# Patient Record
Sex: FEMALE | Race: WHITE | Employment: FULL TIME | URBAN - METROPOLITAN AREA
[De-identification: names, ages, dates, MRNs, and addresses within clinical notes are randomized per-mention and may not be internally consistent; named-entity substitution may affect disease eponyms.]

---

## 2022-01-01 ENCOUNTER — APPOINTMENT (OUTPATIENT)
Dept: GENERAL RADIOLOGY | Facility: HOSPITAL | Age: 52
DRG: 207 | End: 2022-01-01
Attending: INTERNAL MEDICINE
Payer: COMMERCIAL

## 2022-01-01 ENCOUNTER — APPOINTMENT (OUTPATIENT)
Dept: ULTRASOUND IMAGING | Facility: HOSPITAL | Age: 52
DRG: 207 | End: 2022-01-01
Attending: EMERGENCY MEDICINE
Payer: COMMERCIAL

## 2022-01-01 ENCOUNTER — APPOINTMENT (OUTPATIENT)
Dept: ULTRASOUND IMAGING | Facility: HOSPITAL | Age: 52
DRG: 207 | End: 2022-01-01
Attending: INTERNAL MEDICINE
Payer: COMMERCIAL

## 2022-01-01 ENCOUNTER — APPOINTMENT (OUTPATIENT)
Dept: GENERAL RADIOLOGY | Facility: HOSPITAL | Age: 52
DRG: 207 | End: 2022-01-01
Attending: RADIOLOGY
Payer: COMMERCIAL

## 2022-01-01 ENCOUNTER — APPOINTMENT (OUTPATIENT)
Dept: CV DIAGNOSTICS | Facility: HOSPITAL | Age: 52
DRG: 207 | End: 2022-01-01
Attending: INTERNAL MEDICINE
Payer: COMMERCIAL

## 2022-01-01 ENCOUNTER — APPOINTMENT (OUTPATIENT)
Dept: GENERAL RADIOLOGY | Facility: HOSPITAL | Age: 52
DRG: 207 | End: 2022-01-01
Attending: EMERGENCY MEDICINE
Payer: COMMERCIAL

## 2022-01-01 ENCOUNTER — APPOINTMENT (OUTPATIENT)
Dept: CT IMAGING | Facility: HOSPITAL | Age: 52
DRG: 207 | End: 2022-01-01
Attending: EMERGENCY MEDICINE
Payer: COMMERCIAL

## 2022-01-01 ENCOUNTER — APPOINTMENT (OUTPATIENT)
Dept: INTERVENTIONAL RADIOLOGY/VASCULAR | Facility: HOSPITAL | Age: 52
DRG: 207 | End: 2022-01-01
Attending: INTERNAL MEDICINE
Payer: COMMERCIAL

## 2022-01-01 ENCOUNTER — APPOINTMENT (OUTPATIENT)
Dept: PICC SERVICES | Facility: HOSPITAL | Age: 52
DRG: 207 | End: 2022-01-01
Attending: STUDENT IN AN ORGANIZED HEALTH CARE EDUCATION/TRAINING PROGRAM
Payer: COMMERCIAL

## 2022-01-01 ENCOUNTER — HOSPITAL ENCOUNTER (INPATIENT)
Facility: HOSPITAL | Age: 52
LOS: 48 days | DRG: 207 | End: 2022-01-01
Attending: EMERGENCY MEDICINE | Admitting: INTERNAL MEDICINE
Payer: COMMERCIAL

## 2022-01-01 VITALS
TEMPERATURE: 97 F | RESPIRATION RATE: 35 BRPM | HEIGHT: 67 IN | BODY MASS INDEX: 35.64 KG/M2 | OXYGEN SATURATION: 58 % | HEART RATE: 111 BPM | WEIGHT: 227.06 LBS | SYSTOLIC BLOOD PRESSURE: 62 MMHG | DIASTOLIC BLOOD PRESSURE: 35 MMHG

## 2022-01-01 DIAGNOSIS — J96.01 ACUTE HYPOXEMIC RESPIRATORY FAILURE DUE TO COVID-19 (HCC): Primary | ICD-10-CM

## 2022-01-01 DIAGNOSIS — U07.1 ACUTE HYPOXEMIC RESPIRATORY FAILURE DUE TO COVID-19 (HCC): Primary | ICD-10-CM

## 2022-01-01 DIAGNOSIS — E87.1 HYPONATREMIA: ICD-10-CM

## 2022-01-01 LAB
ALBUMIN SERPL-MCNC: 1.4 G/DL (ref 3.4–5)
ALBUMIN SERPL-MCNC: 1.5 G/DL (ref 3.4–5)
ALBUMIN SERPL-MCNC: 1.6 G/DL (ref 3.4–5)
ALBUMIN SERPL-MCNC: 1.7 G/DL (ref 3.4–5)
ALBUMIN SERPL-MCNC: 1.8 G/DL (ref 3.4–5)
ALBUMIN SERPL-MCNC: 1.9 G/DL (ref 3.4–5)
ALBUMIN SERPL-MCNC: 2 G/DL (ref 3.4–5)
ALBUMIN SERPL-MCNC: 2.1 G/DL (ref 3.4–5)
ALBUMIN SERPL-MCNC: 2.2 G/DL (ref 3.4–5)
ALBUMIN SERPL-MCNC: 2.3 G/DL (ref 3.4–5)
ALBUMIN SERPL-MCNC: 2.3 G/DL (ref 3.4–5)
ALBUMIN SERPL-MCNC: 2.4 G/DL (ref 3.4–5)
ALBUMIN SERPL-MCNC: 2.5 G/DL (ref 3.4–5)
ALBUMIN SERPL-MCNC: 2.6 G/DL (ref 3.4–5)
ALBUMIN SERPL-MCNC: 2.7 G/DL (ref 3.4–5)
ALBUMIN SERPL-MCNC: 2.8 G/DL (ref 3.4–5)
ALBUMIN SERPL-MCNC: 2.8 G/DL (ref 3.4–5)
ALBUMIN SERPL-MCNC: 2.9 G/DL (ref 3.4–5)
ALBUMIN/GLOB SERPL: 0.5 {RATIO} (ref 1–2)
ALBUMIN/GLOB SERPL: 0.6 {RATIO} (ref 1–2)
ALBUMIN/GLOB SERPL: 0.7 {RATIO} (ref 1–2)
ALBUMIN/GLOB SERPL: 0.8 {RATIO} (ref 1–2)
ALBUMIN/GLOB SERPL: 0.8 {RATIO} (ref 1–2)
ALBUMIN/GLOB SERPL: 0.9 {RATIO} (ref 1–2)
ALBUMIN/GLOB SERPL: 1.2 {RATIO} (ref 1–2)
ALBUMIN/GLOB SERPL: 1.3 {RATIO} (ref 1–2)
ALP LIVER SERPL-CCNC: 103 U/L
ALP LIVER SERPL-CCNC: 42 U/L
ALP LIVER SERPL-CCNC: 55 U/L
ALP LIVER SERPL-CCNC: 56 U/L
ALP LIVER SERPL-CCNC: 57 U/L
ALP LIVER SERPL-CCNC: 59 U/L
ALP LIVER SERPL-CCNC: 64 U/L
ALP LIVER SERPL-CCNC: 65 U/L
ALP LIVER SERPL-CCNC: 66 U/L
ALP LIVER SERPL-CCNC: 67 U/L
ALP LIVER SERPL-CCNC: 68 U/L
ALP LIVER SERPL-CCNC: 68 U/L
ALP LIVER SERPL-CCNC: 75 U/L
ALP LIVER SERPL-CCNC: 78 U/L
ALP LIVER SERPL-CCNC: 78 U/L
ALP LIVER SERPL-CCNC: 79 U/L
ALP LIVER SERPL-CCNC: 84 U/L
ALP LIVER SERPL-CCNC: 85 U/L
ALP LIVER SERPL-CCNC: 87 U/L
ALP LIVER SERPL-CCNC: 94 U/L
ALP LIVER SERPL-CCNC: 95 U/L
ALP LIVER SERPL-CCNC: 98 U/L
ALT SERPL-CCNC: 19 U/L
ALT SERPL-CCNC: 20 U/L
ALT SERPL-CCNC: 23 U/L
ALT SERPL-CCNC: 24 U/L
ALT SERPL-CCNC: 24 U/L
ALT SERPL-CCNC: 25 U/L
ALT SERPL-CCNC: 25 U/L
ALT SERPL-CCNC: 28 U/L
ALT SERPL-CCNC: 29 U/L
ALT SERPL-CCNC: 32 U/L
ALT SERPL-CCNC: 34 U/L
ALT SERPL-CCNC: 36 U/L
ALT SERPL-CCNC: 38 U/L
ALT SERPL-CCNC: 40 U/L
ALT SERPL-CCNC: 41 U/L
ALT SERPL-CCNC: 42 U/L
ALT SERPL-CCNC: 42 U/L
ALT SERPL-CCNC: 44 U/L
ALT SERPL-CCNC: 45 U/L
ALT SERPL-CCNC: 45 U/L
ALT SERPL-CCNC: 47 U/L
ALT SERPL-CCNC: 50 U/L
ALT SERPL-CCNC: 67 U/L
ANION GAP SERPL CALC-SCNC: 0 MMOL/L (ref 0–18)
ANION GAP SERPL CALC-SCNC: 1 MMOL/L (ref 0–18)
ANION GAP SERPL CALC-SCNC: 10 MMOL/L (ref 0–18)
ANION GAP SERPL CALC-SCNC: 11 MMOL/L (ref 0–18)
ANION GAP SERPL CALC-SCNC: 12 MMOL/L (ref 0–18)
ANION GAP SERPL CALC-SCNC: 2 MMOL/L (ref 0–18)
ANION GAP SERPL CALC-SCNC: 3 MMOL/L (ref 0–18)
ANION GAP SERPL CALC-SCNC: 4 MMOL/L (ref 0–18)
ANION GAP SERPL CALC-SCNC: 5 MMOL/L (ref 0–18)
ANION GAP SERPL CALC-SCNC: 6 MMOL/L (ref 0–18)
ANION GAP SERPL CALC-SCNC: 7 MMOL/L (ref 0–18)
ANION GAP SERPL CALC-SCNC: 8 MMOL/L (ref 0–18)
ANION GAP SERPL CALC-SCNC: 9 MMOL/L (ref 0–18)
ANION GAP SERPL CALC-SCNC: 9 MMOL/L (ref 0–18)
ANTIBODY SCREEN: NEGATIVE
APTT PPP: 101.5 SECONDS (ref 23.3–35.6)
APTT PPP: 101.6 SECONDS (ref 23.3–35.6)
APTT PPP: 108.7 SECONDS (ref 23.3–35.6)
APTT PPP: 109.9 SECONDS (ref 23.3–35.6)
APTT PPP: 112.3 SECONDS (ref 23.3–35.6)
APTT PPP: 122.1 SECONDS (ref 23.3–35.6)
APTT PPP: 134.4 SECONDS (ref 23.3–35.6)
APTT PPP: 151.4 SECONDS (ref 23.3–35.6)
APTT PPP: 224.6 SECONDS (ref 23.3–35.6)
APTT PPP: 24.7 SECONDS (ref 23.3–35.6)
APTT PPP: 29.8 SECONDS (ref 23.3–35.6)
APTT PPP: 36 SECONDS (ref 23.3–35.6)
APTT PPP: 37.5 SECONDS (ref 23.3–35.6)
APTT PPP: 37.6 SECONDS (ref 23.3–35.6)
APTT PPP: 46.9 SECONDS (ref 23.3–35.6)
APTT PPP: 47.7 SECONDS (ref 23.3–35.6)
APTT PPP: 55.6 SECONDS (ref 23.3–35.6)
APTT PPP: 58.1 SECONDS (ref 23.3–35.6)
APTT PPP: 58.5 SECONDS (ref 23.3–35.6)
APTT PPP: 58.6 SECONDS (ref 23.3–35.6)
APTT PPP: 62.2 SECONDS (ref 23.3–35.6)
APTT PPP: 62.4 SECONDS (ref 23.3–35.6)
APTT PPP: 64.1 SECONDS (ref 23.3–35.6)
APTT PPP: 64.2 SECONDS (ref 23.3–35.6)
APTT PPP: 65.6 SECONDS (ref 23.3–35.6)
APTT PPP: 68 SECONDS (ref 23.3–35.6)
APTT PPP: 71.1 SECONDS (ref 23.3–35.6)
APTT PPP: 72.1 SECONDS (ref 23.3–35.6)
APTT PPP: 73.2 SECONDS (ref 23.3–35.6)
APTT PPP: 76 SECONDS (ref 23.3–35.6)
APTT PPP: 77.4 SECONDS (ref 23.3–35.6)
APTT PPP: 77.5 SECONDS (ref 23.3–35.6)
APTT PPP: 77.9 SECONDS (ref 23.3–35.6)
APTT PPP: 78.7 SECONDS (ref 23.3–35.6)
APTT PPP: 80.3 SECONDS (ref 23.3–35.6)
APTT PPP: 81.2 SECONDS (ref 23.3–35.6)
APTT PPP: 82.9 SECONDS (ref 23.3–35.6)
APTT PPP: 84.7 SECONDS (ref 23.3–35.6)
APTT PPP: 84.7 SECONDS (ref 23.3–35.6)
APTT PPP: 85.8 SECONDS (ref 23.3–35.6)
APTT PPP: 86.4 SECONDS (ref 23.3–35.6)
APTT PPP: 86.5 SECONDS (ref 23.3–35.6)
APTT PPP: 87.5 SECONDS (ref 23.3–35.6)
APTT PPP: 88.3 SECONDS (ref 23.3–35.6)
APTT PPP: 90.5 SECONDS (ref 23.3–35.6)
APTT PPP: 91.6 SECONDS (ref 23.3–35.6)
APTT PPP: 92.1 SECONDS (ref 23.3–35.6)
APTT PPP: 94.3 SECONDS (ref 23.3–35.6)
APTT PPP: >240 SECONDS (ref 23.3–35.6)
AST SERPL-CCNC: 116 U/L (ref 15–37)
AST SERPL-CCNC: 134 U/L (ref 15–37)
AST SERPL-CCNC: 17 U/L (ref 15–37)
AST SERPL-CCNC: 18 U/L (ref 15–37)
AST SERPL-CCNC: 19 U/L (ref 15–37)
AST SERPL-CCNC: 24 U/L (ref 15–37)
AST SERPL-CCNC: 27 U/L (ref 15–37)
AST SERPL-CCNC: 29 U/L (ref 15–37)
AST SERPL-CCNC: 32 U/L (ref 15–37)
AST SERPL-CCNC: 33 U/L (ref 15–37)
AST SERPL-CCNC: 33 U/L (ref 15–37)
AST SERPL-CCNC: 34 U/L (ref 15–37)
AST SERPL-CCNC: 36 U/L (ref 15–37)
AST SERPL-CCNC: 39 U/L (ref 15–37)
AST SERPL-CCNC: 44 U/L (ref 15–37)
AST SERPL-CCNC: 45 U/L (ref 15–37)
AST SERPL-CCNC: 50 U/L (ref 15–37)
AST SERPL-CCNC: 54 U/L (ref 15–37)
AST SERPL-CCNC: 59 U/L (ref 15–37)
AST SERPL-CCNC: 61 U/L (ref 15–37)
AST SERPL-CCNC: 67 U/L (ref 15–37)
AST SERPL-CCNC: 70 U/L (ref 15–37)
AST SERPL-CCNC: 76 U/L (ref 15–37)
BASE EXCESS BLD CALC-SCNC: -2.9 MMOL/L (ref ?–2)
BASE EXCESS BLD CALC-SCNC: -5.6 MMOL/L (ref ?–2)
BASE EXCESS BLD CALC-SCNC: -5.9 MMOL/L (ref ?–2)
BASE EXCESS BLD CALC-SCNC: 0.7 MMOL/L (ref ?–2)
BASE EXCESS BLD CALC-SCNC: 0.9 MMOL/L (ref ?–2)
BASE EXCESS BLD CALC-SCNC: 1 MMOL/L (ref ?–2)
BASE EXCESS BLD CALC-SCNC: 1.2 MMOL/L (ref ?–2)
BASE EXCESS BLD CALC-SCNC: 1.4 MMOL/L (ref ?–2)
BASE EXCESS BLD CALC-SCNC: 1.7 MMOL/L (ref ?–2)
BASE EXCESS BLD CALC-SCNC: 1.8 MMOL/L (ref ?–2)
BASE EXCESS BLD CALC-SCNC: 2.1 MMOL/L (ref ?–2)
BASE EXCESS BLD CALC-SCNC: 2.2 MMOL/L (ref ?–2)
BASE EXCESS BLD CALC-SCNC: 2.9 MMOL/L (ref ?–2)
BASE EXCESS BLD CALC-SCNC: 4 MMOL/L (ref ?–2)
BASE EXCESS BLD CALC-SCNC: 4.4 MMOL/L (ref ?–2)
BASE EXCESS BLD CALC-SCNC: 8.5 MMOL/L (ref ?–2)
BASOPHILS # BLD AUTO: 0 X10(3) UL (ref 0–0.2)
BASOPHILS # BLD AUTO: 0.02 X10(3) UL (ref 0–0.2)
BASOPHILS # BLD AUTO: 0.03 X10(3) UL (ref 0–0.2)
BASOPHILS # BLD AUTO: 0.04 X10(3) UL (ref 0–0.2)
BASOPHILS # BLD AUTO: 0.05 X10(3) UL (ref 0–0.2)
BASOPHILS # BLD AUTO: 0.06 X10(3) UL (ref 0–0.2)
BASOPHILS # BLD AUTO: 0.06 X10(3) UL (ref 0–0.2)
BASOPHILS # BLD AUTO: 0.08 X10(3) UL (ref 0–0.2)
BASOPHILS # BLD: 0 X10(3) UL (ref 0–0.2)
BASOPHILS # BLD: 0.3 X10(3) UL (ref 0–0.2)
BASOPHILS # BLD: 0.42 X10(3) UL (ref 0–0.2)
BASOPHILS NFR BLD AUTO: 0 %
BASOPHILS NFR BLD AUTO: 0.1 %
BASOPHILS NFR BLD AUTO: 0.2 %
BASOPHILS NFR BLD AUTO: 0.3 %
BASOPHILS NFR BLD: 0 %
BASOPHILS NFR BLD: 1 %
BASOPHILS NFR BLD: 1 %
BILIRUB SERPL-MCNC: 0.2 MG/DL (ref 0.1–2)
BILIRUB SERPL-MCNC: 0.2 MG/DL (ref 0.1–2)
BILIRUB SERPL-MCNC: 0.3 MG/DL (ref 0.1–2)
BILIRUB SERPL-MCNC: 0.4 MG/DL (ref 0.1–2)
BILIRUB SERPL-MCNC: 0.5 MG/DL (ref 0.1–2)
BILIRUB SERPL-MCNC: 0.6 MG/DL (ref 0.1–2)
BILIRUB SERPL-MCNC: 0.7 MG/DL (ref 0.1–2)
BILIRUB SERPL-MCNC: 0.8 MG/DL (ref 0.1–2)
BILIRUB SERPL-MCNC: 0.8 MG/DL (ref 0.1–2)
BILIRUB SERPL-MCNC: 1.1 MG/DL (ref 0.1–2)
BILIRUB SERPL-MCNC: 1.5 MG/DL (ref 0.1–2)
BILIRUB UR QL: NEGATIVE
BILIRUB UR QL: NEGATIVE
BLOOD TYPE BARCODE: 600
BLOOD TYPE BARCODE: 6200
BLOOD TYPE BARCODE: 6200
BODY TEMPERATURE: 35.8
BODY TEMPERATURE: 35.8
BUN BLD-MCNC: 10 MG/DL (ref 7–18)
BUN BLD-MCNC: 102 MG/DL (ref 7–18)
BUN BLD-MCNC: 11 MG/DL (ref 7–18)
BUN BLD-MCNC: 12 MG/DL (ref 7–18)
BUN BLD-MCNC: 13 MG/DL (ref 7–18)
BUN BLD-MCNC: 14 MG/DL (ref 7–18)
BUN BLD-MCNC: 15 MG/DL (ref 7–18)
BUN BLD-MCNC: 16 MG/DL (ref 7–18)
BUN BLD-MCNC: 17 MG/DL (ref 7–18)
BUN BLD-MCNC: 17 MG/DL (ref 7–18)
BUN BLD-MCNC: 18 MG/DL (ref 7–18)
BUN BLD-MCNC: 18 MG/DL (ref 7–18)
BUN BLD-MCNC: 19 MG/DL (ref 7–18)
BUN BLD-MCNC: 20 MG/DL (ref 7–18)
BUN BLD-MCNC: 21 MG/DL (ref 7–18)
BUN BLD-MCNC: 22 MG/DL (ref 7–18)
BUN BLD-MCNC: 23 MG/DL (ref 7–18)
BUN BLD-MCNC: 24 MG/DL (ref 7–18)
BUN BLD-MCNC: 25 MG/DL (ref 7–18)
BUN BLD-MCNC: 26 MG/DL (ref 7–18)
BUN BLD-MCNC: 27 MG/DL (ref 7–18)
BUN BLD-MCNC: 28 MG/DL (ref 7–18)
BUN BLD-MCNC: 29 MG/DL (ref 7–18)
BUN BLD-MCNC: 30 MG/DL (ref 7–18)
BUN BLD-MCNC: 30 MG/DL (ref 7–18)
BUN BLD-MCNC: 31 MG/DL (ref 7–18)
BUN BLD-MCNC: 32 MG/DL (ref 7–18)
BUN BLD-MCNC: 33 MG/DL (ref 7–18)
BUN BLD-MCNC: 36 MG/DL (ref 7–18)
BUN BLD-MCNC: 36 MG/DL (ref 7–18)
BUN BLD-MCNC: 40 MG/DL (ref 7–18)
BUN BLD-MCNC: 42 MG/DL (ref 7–18)
BUN BLD-MCNC: 43 MG/DL (ref 7–18)
BUN BLD-MCNC: 43 MG/DL (ref 7–18)
BUN BLD-MCNC: 44 MG/DL (ref 7–18)
BUN BLD-MCNC: 60 MG/DL (ref 7–18)
BUN BLD-MCNC: 60 MG/DL (ref 7–18)
BUN BLD-MCNC: 79 MG/DL (ref 7–18)
BUN BLD-MCNC: 84 MG/DL (ref 7–18)
BUN BLD-MCNC: 92 MG/DL (ref 7–18)
BUN/CREAT SERPL: 11.9 (ref 10–20)
BUN/CREAT SERPL: 13.4 (ref 10–20)
BUN/CREAT SERPL: 16.4 (ref 10–20)
BUN/CREAT SERPL: 17.5 (ref 10–20)
BUN/CREAT SERPL: 18.9 (ref 10–20)
BUN/CREAT SERPL: 19.3 (ref 10–20)
BUN/CREAT SERPL: 19.6 (ref 10–20)
BUN/CREAT SERPL: 19.8 (ref 10–20)
BUN/CREAT SERPL: 20 (ref 10–20)
BUN/CREAT SERPL: 20.3 (ref 10–20)
BUN/CREAT SERPL: 20.3 (ref 10–20)
BUN/CREAT SERPL: 20.4 (ref 10–20)
BUN/CREAT SERPL: 20.7 (ref 10–20)
BUN/CREAT SERPL: 20.8 (ref 10–20)
BUN/CREAT SERPL: 20.9 (ref 10–20)
BUN/CREAT SERPL: 21.1 (ref 10–20)
BUN/CREAT SERPL: 21.3 (ref 10–20)
BUN/CREAT SERPL: 21.3 (ref 10–20)
BUN/CREAT SERPL: 21.4 (ref 10–20)
BUN/CREAT SERPL: 21.6 (ref 10–20)
BUN/CREAT SERPL: 22 (ref 10–20)
BUN/CREAT SERPL: 22.1 (ref 10–20)
BUN/CREAT SERPL: 22.2 (ref 10–20)
BUN/CREAT SERPL: 22.3 (ref 10–20)
BUN/CREAT SERPL: 22.6 (ref 10–20)
BUN/CREAT SERPL: 22.7 (ref 10–20)
BUN/CREAT SERPL: 22.8 (ref 10–20)
BUN/CREAT SERPL: 22.8 (ref 10–20)
BUN/CREAT SERPL: 22.9 (ref 10–20)
BUN/CREAT SERPL: 23.1 (ref 10–20)
BUN/CREAT SERPL: 23.1 (ref 10–20)
BUN/CREAT SERPL: 23.2 (ref 10–20)
BUN/CREAT SERPL: 23.2 (ref 10–20)
BUN/CREAT SERPL: 23.3 (ref 10–20)
BUN/CREAT SERPL: 23.3 (ref 10–20)
BUN/CREAT SERPL: 23.5 (ref 10–20)
BUN/CREAT SERPL: 23.5 (ref 10–20)
BUN/CREAT SERPL: 23.7 (ref 10–20)
BUN/CREAT SERPL: 23.9 (ref 10–20)
BUN/CREAT SERPL: 24 (ref 10–20)
BUN/CREAT SERPL: 24.1 (ref 10–20)
BUN/CREAT SERPL: 24.2 (ref 10–20)
BUN/CREAT SERPL: 24.2 (ref 10–20)
BUN/CREAT SERPL: 24.4 (ref 10–20)
BUN/CREAT SERPL: 24.5 (ref 10–20)
BUN/CREAT SERPL: 24.6 (ref 10–20)
BUN/CREAT SERPL: 24.8 (ref 10–20)
BUN/CREAT SERPL: 24.8 (ref 10–20)
BUN/CREAT SERPL: 25 (ref 10–20)
BUN/CREAT SERPL: 25.4 (ref 10–20)
BUN/CREAT SERPL: 25.5 (ref 10–20)
BUN/CREAT SERPL: 25.8 (ref 10–20)
BUN/CREAT SERPL: 26 (ref 10–20)
BUN/CREAT SERPL: 26 (ref 10–20)
BUN/CREAT SERPL: 26.2 (ref 10–20)
BUN/CREAT SERPL: 26.5 (ref 10–20)
BUN/CREAT SERPL: 26.9 (ref 10–20)
BUN/CREAT SERPL: 27 (ref 10–20)
BUN/CREAT SERPL: 27.1 (ref 10–20)
BUN/CREAT SERPL: 27.3 (ref 10–20)
BUN/CREAT SERPL: 27.4 (ref 10–20)
BUN/CREAT SERPL: 27.5 (ref 10–20)
BUN/CREAT SERPL: 27.6 (ref 10–20)
BUN/CREAT SERPL: 28 (ref 10–20)
BUN/CREAT SERPL: 28.2 (ref 10–20)
BUN/CREAT SERPL: 28.7 (ref 10–20)
BUN/CREAT SERPL: 29.8 (ref 10–20)
BUN/CREAT SERPL: 29.9 (ref 10–20)
BUN/CREAT SERPL: 30.2 (ref 10–20)
BUN/CREAT SERPL: 30.6 (ref 10–20)
BUN/CREAT SERPL: 30.7 (ref 10–20)
BUN/CREAT SERPL: 31.1 (ref 10–20)
BUN/CREAT SERPL: 31.4 (ref 10–20)
BUN/CREAT SERPL: 31.9 (ref 10–20)
BUN/CREAT SERPL: 32.1 (ref 10–20)
BUN/CREAT SERPL: 32.6 (ref 10–20)
BUN/CREAT SERPL: 33.3 (ref 10–20)
BUN/CREAT SERPL: 34.4 (ref 10–20)
BUN/CREAT SERPL: 35.2 (ref 10–20)
BUN/CREAT SERPL: 35.6 (ref 10–20)
BUN/CREAT SERPL: 42.6 (ref 10–20)
C DIFF TOX B STL QL: NEGATIVE
C DIFF TOX B STL QL: NEGATIVE
CA-I BLD-SCNC: 1.27 MMOL/L (ref 0.95–1.32)
CA-I BLD-SCNC: 1.46 MMOL/L (ref 0.95–1.32)
CALCIUM BLD-MCNC: 6.8 MG/DL (ref 8.5–10.1)
CALCIUM BLD-MCNC: 6.9 MG/DL (ref 8.5–10.1)
CALCIUM BLD-MCNC: 6.9 MG/DL (ref 8.5–10.1)
CALCIUM BLD-MCNC: 7.2 MG/DL (ref 8.5–10.1)
CALCIUM BLD-MCNC: 7.3 MG/DL (ref 8.5–10.1)
CALCIUM BLD-MCNC: 7.4 MG/DL (ref 8.5–10.1)
CALCIUM BLD-MCNC: 7.4 MG/DL (ref 8.5–10.1)
CALCIUM BLD-MCNC: 7.5 MG/DL (ref 8.5–10.1)
CALCIUM BLD-MCNC: 7.6 MG/DL (ref 8.5–10.1)
CALCIUM BLD-MCNC: 7.7 MG/DL (ref 8.5–10.1)
CALCIUM BLD-MCNC: 7.8 MG/DL (ref 8.5–10.1)
CALCIUM BLD-MCNC: 7.9 MG/DL (ref 8.5–10.1)
CALCIUM BLD-MCNC: 8 MG/DL (ref 8.5–10.1)
CALCIUM BLD-MCNC: 8.1 MG/DL (ref 8.5–10.1)
CALCIUM BLD-MCNC: 8.2 MG/DL (ref 8.5–10.1)
CALCIUM BLD-MCNC: 8.3 MG/DL (ref 8.5–10.1)
CALCIUM BLD-MCNC: 8.4 MG/DL (ref 8.5–10.1)
CALCIUM BLD-MCNC: 8.5 MG/DL (ref 8.5–10.1)
CALCIUM BLD-MCNC: 8.6 MG/DL (ref 8.5–10.1)
CALCIUM BLD-MCNC: 8.7 MG/DL (ref 8.5–10.1)
CALCIUM BLD-MCNC: 8.8 MG/DL (ref 8.5–10.1)
CALCIUM BLD-MCNC: 8.9 MG/DL (ref 8.5–10.1)
CALCIUM BLD-MCNC: 8.9 MG/DL (ref 8.5–10.1)
CALCIUM BLD-MCNC: 9 MG/DL (ref 8.5–10.1)
CALCIUM BLD-MCNC: 9.1 MG/DL (ref 8.5–10.1)
CALCIUM BLD-MCNC: 9.1 MG/DL (ref 8.5–10.1)
CALCIUM BLD-MCNC: 9.3 MG/DL (ref 8.5–10.1)
CALCIUM BLD-MCNC: 9.4 MG/DL (ref 8.5–10.1)
CALCIUM BLD-MCNC: 9.4 MG/DL (ref 8.5–10.1)
CALCIUM BLD-MCNC: 9.9 MG/DL (ref 8.5–10.1)
CHLORIDE SERPL-SCNC: 100 MMOL/L (ref 98–112)
CHLORIDE SERPL-SCNC: 101 MMOL/L (ref 98–112)
CHLORIDE SERPL-SCNC: 102 MMOL/L (ref 98–112)
CHLORIDE SERPL-SCNC: 103 MMOL/L (ref 98–112)
CHLORIDE SERPL-SCNC: 104 MMOL/L (ref 98–112)
CHLORIDE SERPL-SCNC: 105 MMOL/L (ref 98–112)
CHLORIDE SERPL-SCNC: 106 MMOL/L (ref 98–112)
CHLORIDE SERPL-SCNC: 107 MMOL/L (ref 98–112)
CHLORIDE SERPL-SCNC: 108 MMOL/L (ref 98–112)
CHLORIDE SERPL-SCNC: 109 MMOL/L (ref 98–112)
CHLORIDE SERPL-SCNC: 110 MMOL/L (ref 98–112)
CHLORIDE SERPL-SCNC: 110 MMOL/L (ref 98–112)
CHLORIDE SERPL-SCNC: 111 MMOL/L (ref 98–112)
CHLORIDE SERPL-SCNC: 113 MMOL/L (ref 98–112)
CHLORIDE SERPL-SCNC: 113 MMOL/L (ref 98–112)
CHLORIDE SERPL-SCNC: 114 MMOL/L (ref 98–112)
CHLORIDE SERPL-SCNC: 87 MMOL/L (ref 98–112)
CHLORIDE SERPL-SCNC: 91 MMOL/L (ref 98–112)
CHLORIDE SERPL-SCNC: 92 MMOL/L (ref 98–112)
CHLORIDE SERPL-SCNC: 93 MMOL/L (ref 98–112)
CHLORIDE SERPL-SCNC: 94 MMOL/L (ref 98–112)
CHLORIDE SERPL-SCNC: 97 MMOL/L (ref 98–112)
CHLORIDE SERPL-SCNC: 97 MMOL/L (ref 98–112)
CHLORIDE SERPL-SCNC: 98 MMOL/L (ref 98–112)
CK SERPL-CCNC: 114 U/L
CMV ANTIBODY IGM: 33.8 AU/ML
CO2 SERPL-SCNC: 18 MMOL/L (ref 21–32)
CO2 SERPL-SCNC: 18 MMOL/L (ref 21–32)
CO2 SERPL-SCNC: 20 MMOL/L (ref 21–32)
CO2 SERPL-SCNC: 21 MMOL/L (ref 21–32)
CO2 SERPL-SCNC: 22 MMOL/L (ref 21–32)
CO2 SERPL-SCNC: 23 MMOL/L (ref 21–32)
CO2 SERPL-SCNC: 24 MMOL/L (ref 21–32)
CO2 SERPL-SCNC: 25 MMOL/L (ref 21–32)
CO2 SERPL-SCNC: 26 MMOL/L (ref 21–32)
CO2 SERPL-SCNC: 27 MMOL/L (ref 21–32)
CO2 SERPL-SCNC: 28 MMOL/L (ref 21–32)
CO2 SERPL-SCNC: 29 MMOL/L (ref 21–32)
CO2 SERPL-SCNC: 30 MMOL/L (ref 21–32)
CO2 SERPL-SCNC: 31 MMOL/L (ref 21–32)
COHGB MFR BLD: 2.7 % (ref 0–1.5)
COHGB MFR BLD: 5.7 % (ref 0–3)
COLOR UR: YELLOW
COLOR UR: YELLOW
CREAT BLD-MCNC: 0.45 MG/DL
CREAT BLD-MCNC: 0.48 MG/DL
CREAT BLD-MCNC: 0.5 MG/DL
CREAT BLD-MCNC: 0.5 MG/DL
CREAT BLD-MCNC: 0.51 MG/DL
CREAT BLD-MCNC: 0.51 MG/DL
CREAT BLD-MCNC: 0.53 MG/DL
CREAT BLD-MCNC: 0.54 MG/DL
CREAT BLD-MCNC: 0.54 MG/DL
CREAT BLD-MCNC: 0.55 MG/DL
CREAT BLD-MCNC: 0.56 MG/DL
CREAT BLD-MCNC: 0.57 MG/DL
CREAT BLD-MCNC: 0.57 MG/DL
CREAT BLD-MCNC: 0.58 MG/DL
CREAT BLD-MCNC: 0.59 MG/DL
CREAT BLD-MCNC: 0.6 MG/DL
CREAT BLD-MCNC: 0.61 MG/DL
CREAT BLD-MCNC: 0.61 MG/DL
CREAT BLD-MCNC: 0.62 MG/DL
CREAT BLD-MCNC: 0.67 MG/DL
CREAT BLD-MCNC: 0.69 MG/DL
CREAT BLD-MCNC: 0.69 MG/DL
CREAT BLD-MCNC: 0.71 MG/DL
CREAT BLD-MCNC: 0.72 MG/DL
CREAT BLD-MCNC: 0.73 MG/DL
CREAT BLD-MCNC: 0.75 MG/DL
CREAT BLD-MCNC: 0.75 MG/DL
CREAT BLD-MCNC: 0.76 MG/DL
CREAT BLD-MCNC: 0.79 MG/DL
CREAT BLD-MCNC: 0.88 MG/DL
CREAT BLD-MCNC: 0.88 MG/DL
CREAT BLD-MCNC: 0.89 MG/DL
CREAT BLD-MCNC: 0.9 MG/DL
CREAT BLD-MCNC: 0.91 MG/DL
CREAT BLD-MCNC: 0.92 MG/DL
CREAT BLD-MCNC: 0.93 MG/DL
CREAT BLD-MCNC: 0.94 MG/DL
CREAT BLD-MCNC: 0.95 MG/DL
CREAT BLD-MCNC: 0.96 MG/DL
CREAT BLD-MCNC: 0.97 MG/DL
CREAT BLD-MCNC: 1 MG/DL
CREAT BLD-MCNC: 1 MG/DL
CREAT BLD-MCNC: 1.01 MG/DL
CREAT BLD-MCNC: 1.02 MG/DL
CREAT BLD-MCNC: 1.02 MG/DL
CREAT BLD-MCNC: 1.06 MG/DL
CREAT BLD-MCNC: 1.06 MG/DL
CREAT BLD-MCNC: 1.07 MG/DL
CREAT BLD-MCNC: 1.08 MG/DL
CREAT BLD-MCNC: 1.1 MG/DL
CREAT BLD-MCNC: 1.12 MG/DL
CREAT BLD-MCNC: 1.13 MG/DL
CREAT BLD-MCNC: 1.14 MG/DL
CREAT BLD-MCNC: 1.14 MG/DL
CREAT BLD-MCNC: 1.15 MG/DL
CREAT BLD-MCNC: 1.15 MG/DL
CREAT BLD-MCNC: 1.18 MG/DL
CREAT BLD-MCNC: 1.18 MG/DL
CREAT BLD-MCNC: 1.21 MG/DL
CREAT BLD-MCNC: 1.22 MG/DL
CREAT BLD-MCNC: 1.23 MG/DL
CREAT BLD-MCNC: 1.31 MG/DL
CREAT BLD-MCNC: 1.31 MG/DL
CREAT BLD-MCNC: 1.32 MG/DL
CREAT BLD-MCNC: 1.33 MG/DL
CREAT BLD-MCNC: 1.34 MG/DL
CREAT BLD-MCNC: 1.34 MG/DL
CREAT BLD-MCNC: 1.37 MG/DL
CREAT BLD-MCNC: 1.49 MG/DL
CREAT BLD-MCNC: 1.58 MG/DL
CREAT BLD-MCNC: 1.62 MG/DL
CREAT BLD-MCNC: 2.46 MG/DL
CREAT BLD-MCNC: 2.48 MG/DL
CREAT BLD-MCNC: 2.58 MG/DL
CREAT BLD-MCNC: 3.31 MG/DL
CREAT BLD-MCNC: 3.47 MG/DL
CREAT BLD-MCNC: 3.62 MG/DL
CREAT BLD-MCNC: 4.34 MG/DL
CREAT UR-SCNC: 41.8 MG/DL
CREAT UR-SCNC: 64.7 MG/DL
CREAT UR-SCNC: 88 MG/DL
CRP SERPL-MCNC: 0.32 MG/DL (ref ?–0.3)
CRP SERPL-MCNC: 0.39 MG/DL (ref ?–0.3)
CRP SERPL-MCNC: 0.46 MG/DL (ref ?–0.3)
CRP SERPL-MCNC: 0.72 MG/DL (ref ?–0.3)
CRP SERPL-MCNC: 0.78 MG/DL (ref ?–0.3)
CRP SERPL-MCNC: 1.16 MG/DL (ref ?–0.3)
CRP SERPL-MCNC: 1.44 MG/DL (ref ?–0.3)
CRP SERPL-MCNC: 1.93 MG/DL (ref ?–0.3)
CRP SERPL-MCNC: 10.7 MG/DL (ref ?–0.3)
CRP SERPL-MCNC: 11 MG/DL (ref ?–0.3)
CRP SERPL-MCNC: 12.6 MG/DL (ref ?–0.3)
CRP SERPL-MCNC: 13.1 MG/DL (ref ?–0.3)
CRP SERPL-MCNC: 14.4 MG/DL (ref ?–0.3)
CRP SERPL-MCNC: 2.56 MG/DL (ref ?–0.3)
CRP SERPL-MCNC: 2.89 MG/DL (ref ?–0.3)
CRP SERPL-MCNC: 5.35 MG/DL (ref ?–0.3)
CRP SERPL-MCNC: 5.93 MG/DL (ref ?–0.3)
CRP SERPL-MCNC: 5.98 MG/DL (ref ?–0.3)
CRP SERPL-MCNC: 6.52 MG/DL (ref ?–0.3)
CRP SERPL-MCNC: 6.67 MG/DL (ref ?–0.3)
CRP SERPL-MCNC: 8.32 MG/DL (ref ?–0.3)
CYTOMEGALOVIRUS DETECTION, PCR: NOT DETECTED
D DIMER PPP FEU-MCNC: 1.05 UG/ML FEU (ref ?–0.51)
D DIMER PPP FEU-MCNC: 1.08 UG/ML FEU (ref ?–0.51)
D DIMER PPP FEU-MCNC: 1.09 UG/ML FEU (ref ?–0.51)
D DIMER PPP FEU-MCNC: 1.37 UG/ML FEU (ref ?–0.51)
D DIMER PPP FEU-MCNC: 2.14 UG/ML FEU (ref ?–0.51)
D DIMER PPP FEU-MCNC: >20 UG/ML FEU (ref ?–0.51)
D DIMER PPP FEU-MCNC: >20 UG/ML FEU (ref ?–0.51)
DEPRECATED HBV CORE AB SER IA-ACNC: 1015.8 NG/ML
DEPRECATED HBV CORE AB SER IA-ACNC: 1229.7 NG/ML
DEPRECATED HBV CORE AB SER IA-ACNC: 1486.1 NG/ML
DEPRECATED HBV CORE AB SER IA-ACNC: 1642.4 NG/ML
DEPRECATED HBV CORE AB SER IA-ACNC: 1904.5 NG/ML
DEPRECATED HBV CORE AB SER IA-ACNC: 1921.7 NG/ML
DEPRECATED HBV CORE AB SER IA-ACNC: 2133.4 NG/ML
DEPRECATED HBV CORE AB SER IA-ACNC: 2633.3 NG/ML
DEPRECATED HBV CORE AB SER IA-ACNC: 3642.5 NG/ML
DEPRECATED HBV CORE AB SER IA-ACNC: 3676.1 NG/ML
DEPRECATED HBV CORE AB SER IA-ACNC: 3737.7 NG/ML
DEPRECATED HBV CORE AB SER IA-ACNC: 489.5 NG/ML
DEPRECATED HBV CORE AB SER IA-ACNC: 498.1 NG/ML
DEPRECATED HBV CORE AB SER IA-ACNC: 516.3 NG/ML
DEPRECATED HBV CORE AB SER IA-ACNC: 575.7 NG/ML
DEPRECATED HBV CORE AB SER IA-ACNC: 592.9 NG/ML
DEPRECATED HBV CORE AB SER IA-ACNC: 650.3 NG/ML
DEPRECATED HBV CORE AB SER IA-ACNC: 675.6 NG/ML
DEPRECATED HBV CORE AB SER IA-ACNC: 693.3 NG/ML
DEPRECATED HBV CORE AB SER IA-ACNC: 760.4 NG/ML
DEPRECATED HBV CORE AB SER IA-ACNC: 790.5 NG/ML
DEPRECATED RDW RBC AUTO: 109.7 FL (ref 35.1–46.3)
DEPRECATED RDW RBC AUTO: 115.7 FL (ref 35.1–46.3)
DEPRECATED RDW RBC AUTO: 117.9 FL (ref 35.1–46.3)
DEPRECATED RDW RBC AUTO: 120.9 FL (ref 35.1–46.3)
DEPRECATED RDW RBC AUTO: 121.8 FL (ref 35.1–46.3)
DEPRECATED RDW RBC AUTO: 122 FL (ref 35.1–46.3)
DEPRECATED RDW RBC AUTO: 123 FL (ref 35.1–46.3)
DEPRECATED RDW RBC AUTO: 123.6 FL (ref 35.1–46.3)
DEPRECATED RDW RBC AUTO: 125.6 FL (ref 35.1–46.3)
DEPRECATED RDW RBC AUTO: 126.6 FL (ref 35.1–46.3)
DEPRECATED RDW RBC AUTO: 131.6 FL (ref 35.1–46.3)
DEPRECATED RDW RBC AUTO: 132.4 FL (ref 35.1–46.3)
DEPRECATED RDW RBC AUTO: 135.8 FL (ref 35.1–46.3)
DEPRECATED RDW RBC AUTO: 137 FL (ref 35.1–46.3)
DEPRECATED RDW RBC AUTO: 41.6 FL (ref 35.1–46.3)
DEPRECATED RDW RBC AUTO: 42.3 FL (ref 35.1–46.3)
DEPRECATED RDW RBC AUTO: 42.5 FL (ref 35.1–46.3)
DEPRECATED RDW RBC AUTO: 42.5 FL (ref 35.1–46.3)
DEPRECATED RDW RBC AUTO: 42.8 FL (ref 35.1–46.3)
DEPRECATED RDW RBC AUTO: 43.4 FL (ref 35.1–46.3)
DEPRECATED RDW RBC AUTO: 43.6 FL (ref 35.1–46.3)
DEPRECATED RDW RBC AUTO: 43.6 FL (ref 35.1–46.3)
DEPRECATED RDW RBC AUTO: 43.8 FL (ref 35.1–46.3)
DEPRECATED RDW RBC AUTO: 44.4 FL (ref 35.1–46.3)
DEPRECATED RDW RBC AUTO: 48.8 FL (ref 35.1–46.3)
DEPRECATED RDW RBC AUTO: 50.6 FL (ref 35.1–46.3)
DEPRECATED RDW RBC AUTO: 51.5 FL (ref 35.1–46.3)
DEPRECATED RDW RBC AUTO: 52 FL (ref 35.1–46.3)
DEPRECATED RDW RBC AUTO: 52.3 FL (ref 35.1–46.3)
DEPRECATED RDW RBC AUTO: 52.7 FL (ref 35.1–46.3)
DEPRECATED RDW RBC AUTO: 53 FL (ref 35.1–46.3)
DEPRECATED RDW RBC AUTO: 54.1 FL (ref 35.1–46.3)
DEPRECATED RDW RBC AUTO: 54.1 FL (ref 35.1–46.3)
DEPRECATED RDW RBC AUTO: 54.5 FL (ref 35.1–46.3)
DEPRECATED RDW RBC AUTO: 55.1 FL (ref 35.1–46.3)
DEPRECATED RDW RBC AUTO: 55.8 FL (ref 35.1–46.3)
DEPRECATED RDW RBC AUTO: 58.6 FL (ref 35.1–46.3)
DEPRECATED RDW RBC AUTO: 63.2 FL (ref 35.1–46.3)
DEPRECATED RDW RBC AUTO: 66.7 FL (ref 35.1–46.3)
DEPRECATED RDW RBC AUTO: 67.2 FL (ref 35.1–46.3)
DEPRECATED RDW RBC AUTO: 67.3 FL (ref 35.1–46.3)
DEPRECATED RDW RBC AUTO: 68.2 FL (ref 35.1–46.3)
DEPRECATED RDW RBC AUTO: 68.4 FL (ref 35.1–46.3)
DEPRECATED RDW RBC AUTO: 69 FL (ref 35.1–46.3)
DEPRECATED RDW RBC AUTO: 82.5 FL (ref 35.1–46.3)
DEPRECATED RDW RBC AUTO: 88.1 FL (ref 35.1–46.3)
DEPRECATED RDW RBC AUTO: 88.2 FL (ref 35.1–46.3)
EBV NA IGG SER QL IA: POSITIVE
EBV VCA IGG SER QL IA: POSITIVE
EBV VCA IGM SER QL IA: NEGATIVE
EOSINOPHIL # BLD AUTO: 0 X10(3) UL (ref 0–0.7)
EOSINOPHIL # BLD AUTO: 0.03 X10(3) UL (ref 0–0.7)
EOSINOPHIL # BLD AUTO: 0.06 X10(3) UL (ref 0–0.7)
EOSINOPHIL # BLD AUTO: 0.07 X10(3) UL (ref 0–0.7)
EOSINOPHIL # BLD AUTO: 0.12 X10(3) UL (ref 0–0.7)
EOSINOPHIL # BLD AUTO: 0.15 X10(3) UL (ref 0–0.7)
EOSINOPHIL # BLD AUTO: 0.18 X10(3) UL (ref 0–0.7)
EOSINOPHIL # BLD AUTO: 0.42 X10(3) UL (ref 0–0.7)
EOSINOPHIL # BLD AUTO: 0.5 X10(3) UL (ref 0–0.7)
EOSINOPHIL # BLD AUTO: 0.51 X10(3) UL (ref 0–0.7)
EOSINOPHIL # BLD AUTO: 0.54 X10(3) UL (ref 0–0.7)
EOSINOPHIL # BLD AUTO: 0.81 X10(3) UL (ref 0–0.7)
EOSINOPHIL # BLD AUTO: 0.84 X10(3) UL (ref 0–0.7)
EOSINOPHIL # BLD: 0 X10(3) UL (ref 0–0.7)
EOSINOPHIL # BLD: 0.27 X10(3) UL (ref 0–0.7)
EOSINOPHIL # BLD: 0.3 X10(3) UL (ref 0–0.7)
EOSINOPHIL # BLD: 0.31 X10(3) UL (ref 0–0.7)
EOSINOPHIL # BLD: 0.42 X10(3) UL (ref 0–0.7)
EOSINOPHIL # BLD: 0.59 X10(3) UL (ref 0–0.7)
EOSINOPHIL # BLD: 0.88 X10(3) UL (ref 0–0.7)
EOSINOPHIL # BLD: 1.52 X10(3) UL (ref 0–0.7)
EOSINOPHIL # BLD: 2.16 X10(3) UL (ref 0–0.7)
EOSINOPHIL # BLD: 2.66 X10(3) UL (ref 0–0.7)
EOSINOPHIL # BLD: 4.7 X10(3) UL (ref 0–0.7)
EOSINOPHIL NFR BLD AUTO: 0 %
EOSINOPHIL NFR BLD AUTO: 0.1 %
EOSINOPHIL NFR BLD AUTO: 0.3 %
EOSINOPHIL NFR BLD AUTO: 0.3 %
EOSINOPHIL NFR BLD AUTO: 0.6 %
EOSINOPHIL NFR BLD AUTO: 0.8 %
EOSINOPHIL NFR BLD AUTO: 0.9 %
EOSINOPHIL NFR BLD AUTO: 2.4 %
EOSINOPHIL NFR BLD AUTO: 2.6 %
EOSINOPHIL NFR BLD AUTO: 2.7 %
EOSINOPHIL NFR BLD AUTO: 3 %
EOSINOPHIL NFR BLD AUTO: 3.2 %
EOSINOPHIL NFR BLD AUTO: 3.5 %
EOSINOPHIL NFR BLD AUTO: 4.8 %
EOSINOPHIL NFR BLD: 0 %
EOSINOPHIL NFR BLD: 1 %
EOSINOPHIL NFR BLD: 2 %
EOSINOPHIL NFR BLD: 3 %
EOSINOPHIL NFR BLD: 5 %
EOSINOPHIL NFR BLD: 8 %
EOSINOPHIL NFR BLD: 9 %
ERYTHROCYTE [DISTWIDTH] IN BLOOD BY AUTOMATED COUNT: 12.9 % (ref 11–15)
ERYTHROCYTE [DISTWIDTH] IN BLOOD BY AUTOMATED COUNT: 12.9 % (ref 11–15)
ERYTHROCYTE [DISTWIDTH] IN BLOOD BY AUTOMATED COUNT: 13.2 % (ref 11–15)
ERYTHROCYTE [DISTWIDTH] IN BLOOD BY AUTOMATED COUNT: 13.3 % (ref 11–15)
ERYTHROCYTE [DISTWIDTH] IN BLOOD BY AUTOMATED COUNT: 13.4 % (ref 11–15)
ERYTHROCYTE [DISTWIDTH] IN BLOOD BY AUTOMATED COUNT: 14.2 % (ref 11–15)
ERYTHROCYTE [DISTWIDTH] IN BLOOD BY AUTOMATED COUNT: 14.6 % (ref 11–15)
ERYTHROCYTE [DISTWIDTH] IN BLOOD BY AUTOMATED COUNT: 14.8 % (ref 11–15)
ERYTHROCYTE [DISTWIDTH] IN BLOOD BY AUTOMATED COUNT: 15.1 % (ref 11–15)
ERYTHROCYTE [DISTWIDTH] IN BLOOD BY AUTOMATED COUNT: 15.3 % (ref 11–15)
ERYTHROCYTE [DISTWIDTH] IN BLOOD BY AUTOMATED COUNT: 15.5 % (ref 11–15)
ERYTHROCYTE [DISTWIDTH] IN BLOOD BY AUTOMATED COUNT: 15.7 % (ref 11–15)
ERYTHROCYTE [DISTWIDTH] IN BLOOD BY AUTOMATED COUNT: 15.9 % (ref 11–15)
ERYTHROCYTE [DISTWIDTH] IN BLOOD BY AUTOMATED COUNT: 16 % (ref 11–15)
ERYTHROCYTE [DISTWIDTH] IN BLOOD BY AUTOMATED COUNT: 16.1 % (ref 11–15)
ERYTHROCYTE [DISTWIDTH] IN BLOOD BY AUTOMATED COUNT: 16.2 % (ref 11–15)
ERYTHROCYTE [DISTWIDTH] IN BLOOD BY AUTOMATED COUNT: 17.2 % (ref 11–15)
ERYTHROCYTE [DISTWIDTH] IN BLOOD BY AUTOMATED COUNT: 18.8 % (ref 11–15)
ERYTHROCYTE [DISTWIDTH] IN BLOOD BY AUTOMATED COUNT: 19.1 % (ref 11–15)
ERYTHROCYTE [DISTWIDTH] IN BLOOD BY AUTOMATED COUNT: 19.2 % (ref 11–15)
ERYTHROCYTE [DISTWIDTH] IN BLOOD BY AUTOMATED COUNT: 19.2 % (ref 11–15)
ERYTHROCYTE [DISTWIDTH] IN BLOOD BY AUTOMATED COUNT: 20 % (ref 11–15)
ERYTHROCYTE [DISTWIDTH] IN BLOOD BY AUTOMATED COUNT: 21.2 % (ref 11–15)
ERYTHROCYTE [DISTWIDTH] IN BLOOD BY AUTOMATED COUNT: 22.5 % (ref 11–15)
ERYTHROCYTE [DISTWIDTH] IN BLOOD BY AUTOMATED COUNT: 23.9 % (ref 11–15)
ERYTHROCYTE [DISTWIDTH] IN BLOOD BY AUTOMATED COUNT: 26.2 % (ref 11–15)
ERYTHROCYTE [DISTWIDTH] IN BLOOD BY AUTOMATED COUNT: 27.1 % (ref 11–15)
ERYTHROCYTE [DISTWIDTH] IN BLOOD BY AUTOMATED COUNT: 28.3 % (ref 11–15)
ERYTHROCYTE [DISTWIDTH] IN BLOOD BY AUTOMATED COUNT: 28.9 % (ref 11–15)
ERYTHROCYTE [DISTWIDTH] IN BLOOD BY AUTOMATED COUNT: 29 % (ref 11–15)
ERYTHROCYTE [DISTWIDTH] IN BLOOD BY AUTOMATED COUNT: 29.6 % (ref 11–15)
ERYTHROCYTE [DISTWIDTH] IN BLOOD BY AUTOMATED COUNT: 29.8 % (ref 11–15)
ERYTHROCYTE [DISTWIDTH] IN BLOOD BY AUTOMATED COUNT: 30.2 % (ref 11–15)
ERYTHROCYTE [DISTWIDTH] IN BLOOD BY AUTOMATED COUNT: 30.6 % (ref 11–15)
ERYTHROCYTE [DISTWIDTH] IN BLOOD BY AUTOMATED COUNT: 31 % (ref 11–15)
ERYTHROCYTE [DISTWIDTH] IN BLOOD BY AUTOMATED COUNT: 31.3 % (ref 11–15)
ERYTHROCYTE [DISTWIDTH] IN BLOOD BY AUTOMATED COUNT: 31.7 % (ref 11–15)
ERYTHROCYTE [DISTWIDTH] IN BLOOD BY AUTOMATED COUNT: 31.7 % (ref 11–15)
ERYTHROCYTE [DISTWIDTH] IN BLOOD BY AUTOMATED COUNT: 32.3 % (ref 11–15)
ERYTHROCYTE [DISTWIDTH] IN BLOOD BY AUTOMATED COUNT: 32.5 % (ref 11–15)
ERYTHROCYTE [DISTWIDTH] IN BLOOD BY AUTOMATED COUNT: 33.3 % (ref 11–15)
ERYTHROCYTE [DISTWIDTH] IN BLOOD BY AUTOMATED COUNT: 33.5 % (ref 11–15)
ERYTHROCYTE [DISTWIDTH] IN BLOOD BY AUTOMATED COUNT: 33.6 % (ref 11–15)
GLOBULIN PLAS-MCNC: 2 G/DL (ref 2.8–4.4)
GLOBULIN PLAS-MCNC: 2 G/DL (ref 2.8–4.4)
GLOBULIN PLAS-MCNC: 2.2 G/DL (ref 2.8–4.4)
GLOBULIN PLAS-MCNC: 2.3 G/DL (ref 2.8–4.4)
GLOBULIN PLAS-MCNC: 2.5 G/DL (ref 2.8–4.4)
GLOBULIN PLAS-MCNC: 2.7 G/DL (ref 2.8–4.4)
GLOBULIN PLAS-MCNC: 2.8 G/DL (ref 2.8–4.4)
GLOBULIN PLAS-MCNC: 2.9 G/DL (ref 2.8–4.4)
GLOBULIN PLAS-MCNC: 3 G/DL (ref 2.8–4.4)
GLOBULIN PLAS-MCNC: 3.2 G/DL (ref 2.8–4.4)
GLOBULIN PLAS-MCNC: 3.3 G/DL (ref 2.8–4.4)
GLOBULIN PLAS-MCNC: 3.4 G/DL (ref 2.8–4.4)
GLOBULIN PLAS-MCNC: 3.6 G/DL (ref 2.8–4.4)
GLUCOSE BLD-MCNC: 103 MG/DL (ref 70–99)
GLUCOSE BLD-MCNC: 108 MG/DL (ref 70–99)
GLUCOSE BLD-MCNC: 108 MG/DL (ref 70–99)
GLUCOSE BLD-MCNC: 110 MG/DL (ref 70–99)
GLUCOSE BLD-MCNC: 111 MG/DL (ref 70–99)
GLUCOSE BLD-MCNC: 114 MG/DL (ref 70–99)
GLUCOSE BLD-MCNC: 115 MG/DL (ref 70–99)
GLUCOSE BLD-MCNC: 117 MG/DL (ref 70–99)
GLUCOSE BLD-MCNC: 117 MG/DL (ref 70–99)
GLUCOSE BLD-MCNC: 118 MG/DL (ref 70–99)
GLUCOSE BLD-MCNC: 118 MG/DL (ref 70–99)
GLUCOSE BLD-MCNC: 119 MG/DL (ref 70–99)
GLUCOSE BLD-MCNC: 120 MG/DL (ref 70–99)
GLUCOSE BLD-MCNC: 121 MG/DL (ref 70–99)
GLUCOSE BLD-MCNC: 123 MG/DL (ref 70–99)
GLUCOSE BLD-MCNC: 125 MG/DL (ref 70–99)
GLUCOSE BLD-MCNC: 128 MG/DL (ref 70–99)
GLUCOSE BLD-MCNC: 128 MG/DL (ref 70–99)
GLUCOSE BLD-MCNC: 129 MG/DL (ref 70–99)
GLUCOSE BLD-MCNC: 129 MG/DL (ref 70–99)
GLUCOSE BLD-MCNC: 130 MG/DL (ref 70–99)
GLUCOSE BLD-MCNC: 133 MG/DL (ref 70–99)
GLUCOSE BLD-MCNC: 133 MG/DL (ref 70–99)
GLUCOSE BLD-MCNC: 134 MG/DL (ref 70–99)
GLUCOSE BLD-MCNC: 134 MG/DL (ref 70–99)
GLUCOSE BLD-MCNC: 137 MG/DL (ref 70–99)
GLUCOSE BLD-MCNC: 137 MG/DL (ref 70–99)
GLUCOSE BLD-MCNC: 138 MG/DL (ref 70–99)
GLUCOSE BLD-MCNC: 138 MG/DL (ref 70–99)
GLUCOSE BLD-MCNC: 143 MG/DL (ref 70–99)
GLUCOSE BLD-MCNC: 144 MG/DL (ref 70–99)
GLUCOSE BLD-MCNC: 148 MG/DL (ref 70–99)
GLUCOSE BLD-MCNC: 149 MG/DL (ref 70–99)
GLUCOSE BLD-MCNC: 152 MG/DL (ref 70–99)
GLUCOSE BLD-MCNC: 152 MG/DL (ref 70–99)
GLUCOSE BLD-MCNC: 153 MG/DL (ref 70–99)
GLUCOSE BLD-MCNC: 154 MG/DL (ref 70–99)
GLUCOSE BLD-MCNC: 157 MG/DL (ref 70–99)
GLUCOSE BLD-MCNC: 163 MG/DL (ref 70–99)
GLUCOSE BLD-MCNC: 164 MG/DL (ref 70–99)
GLUCOSE BLD-MCNC: 165 MG/DL (ref 70–99)
GLUCOSE BLD-MCNC: 165 MG/DL (ref 70–99)
GLUCOSE BLD-MCNC: 166 MG/DL (ref 70–99)
GLUCOSE BLD-MCNC: 167 MG/DL (ref 70–99)
GLUCOSE BLD-MCNC: 167 MG/DL (ref 70–99)
GLUCOSE BLD-MCNC: 174 MG/DL (ref 70–99)
GLUCOSE BLD-MCNC: 178 MG/DL (ref 70–99)
GLUCOSE BLD-MCNC: 178 MG/DL (ref 70–99)
GLUCOSE BLD-MCNC: 179 MG/DL (ref 70–99)
GLUCOSE BLD-MCNC: 182 MG/DL (ref 70–99)
GLUCOSE BLD-MCNC: 184 MG/DL (ref 70–99)
GLUCOSE BLD-MCNC: 184 MG/DL (ref 70–99)
GLUCOSE BLD-MCNC: 186 MG/DL (ref 70–99)
GLUCOSE BLD-MCNC: 188 MG/DL (ref 70–99)
GLUCOSE BLD-MCNC: 189 MG/DL (ref 70–99)
GLUCOSE BLD-MCNC: 189 MG/DL (ref 70–99)
GLUCOSE BLD-MCNC: 191 MG/DL (ref 70–99)
GLUCOSE BLD-MCNC: 191 MG/DL (ref 70–99)
GLUCOSE BLD-MCNC: 194 MG/DL (ref 70–99)
GLUCOSE BLD-MCNC: 194 MG/DL (ref 70–99)
GLUCOSE BLD-MCNC: 195 MG/DL (ref 70–99)
GLUCOSE BLD-MCNC: 195 MG/DL (ref 70–99)
GLUCOSE BLD-MCNC: 196 MG/DL (ref 70–99)
GLUCOSE BLD-MCNC: 205 MG/DL (ref 70–99)
GLUCOSE BLD-MCNC: 210 MG/DL (ref 70–99)
GLUCOSE BLD-MCNC: 218 MG/DL (ref 70–99)
GLUCOSE BLD-MCNC: 219 MG/DL (ref 70–99)
GLUCOSE BLD-MCNC: 233 MG/DL (ref 70–99)
GLUCOSE BLD-MCNC: 237 MG/DL (ref 70–99)
GLUCOSE BLD-MCNC: 240 MG/DL (ref 70–99)
GLUCOSE BLD-MCNC: 282 MG/DL (ref 70–99)
GLUCOSE BLD-MCNC: 94 MG/DL (ref 70–99)
GLUCOSE BLD-MCNC: 95 MG/DL (ref 70–99)
GLUCOSE BLD-MCNC: 97 MG/DL (ref 70–99)
GLUCOSE BLDC GLUCOMTR-MCNC: 107 MG/DL (ref 70–99)
GLUCOSE BLDC GLUCOMTR-MCNC: 111 MG/DL (ref 70–99)
GLUCOSE BLDC GLUCOMTR-MCNC: 114 MG/DL (ref 70–99)
GLUCOSE BLDC GLUCOMTR-MCNC: 114 MG/DL (ref 70–99)
GLUCOSE BLDC GLUCOMTR-MCNC: 116 MG/DL (ref 70–99)
GLUCOSE BLDC GLUCOMTR-MCNC: 118 MG/DL (ref 70–99)
GLUCOSE BLDC GLUCOMTR-MCNC: 120 MG/DL (ref 70–99)
GLUCOSE BLDC GLUCOMTR-MCNC: 122 MG/DL (ref 70–99)
GLUCOSE BLDC GLUCOMTR-MCNC: 123 MG/DL (ref 70–99)
GLUCOSE BLDC GLUCOMTR-MCNC: 124 MG/DL (ref 70–99)
GLUCOSE BLDC GLUCOMTR-MCNC: 127 MG/DL (ref 70–99)
GLUCOSE BLDC GLUCOMTR-MCNC: 128 MG/DL (ref 70–99)
GLUCOSE BLDC GLUCOMTR-MCNC: 132 MG/DL (ref 70–99)
GLUCOSE BLDC GLUCOMTR-MCNC: 132 MG/DL (ref 70–99)
GLUCOSE BLDC GLUCOMTR-MCNC: 134 MG/DL (ref 70–99)
GLUCOSE BLDC GLUCOMTR-MCNC: 135 MG/DL (ref 70–99)
GLUCOSE BLDC GLUCOMTR-MCNC: 137 MG/DL (ref 70–99)
GLUCOSE BLDC GLUCOMTR-MCNC: 137 MG/DL (ref 70–99)
GLUCOSE BLDC GLUCOMTR-MCNC: 138 MG/DL (ref 70–99)
GLUCOSE BLDC GLUCOMTR-MCNC: 139 MG/DL (ref 70–99)
GLUCOSE BLDC GLUCOMTR-MCNC: 139 MG/DL (ref 70–99)
GLUCOSE BLDC GLUCOMTR-MCNC: 142 MG/DL (ref 70–99)
GLUCOSE BLDC GLUCOMTR-MCNC: 142 MG/DL (ref 70–99)
GLUCOSE BLDC GLUCOMTR-MCNC: 143 MG/DL (ref 70–99)
GLUCOSE BLDC GLUCOMTR-MCNC: 143 MG/DL (ref 70–99)
GLUCOSE BLDC GLUCOMTR-MCNC: 144 MG/DL (ref 70–99)
GLUCOSE BLDC GLUCOMTR-MCNC: 148 MG/DL (ref 70–99)
GLUCOSE BLDC GLUCOMTR-MCNC: 149 MG/DL (ref 70–99)
GLUCOSE BLDC GLUCOMTR-MCNC: 150 MG/DL (ref 70–99)
GLUCOSE BLDC GLUCOMTR-MCNC: 150 MG/DL (ref 70–99)
GLUCOSE BLDC GLUCOMTR-MCNC: 151 MG/DL (ref 70–99)
GLUCOSE BLDC GLUCOMTR-MCNC: 153 MG/DL (ref 70–99)
GLUCOSE BLDC GLUCOMTR-MCNC: 153 MG/DL (ref 70–99)
GLUCOSE BLDC GLUCOMTR-MCNC: 158 MG/DL (ref 70–99)
GLUCOSE BLDC GLUCOMTR-MCNC: 160 MG/DL (ref 70–99)
GLUCOSE BLDC GLUCOMTR-MCNC: 162 MG/DL (ref 70–99)
GLUCOSE BLDC GLUCOMTR-MCNC: 163 MG/DL (ref 70–99)
GLUCOSE BLDC GLUCOMTR-MCNC: 165 MG/DL (ref 70–99)
GLUCOSE BLDC GLUCOMTR-MCNC: 167 MG/DL (ref 70–99)
GLUCOSE BLDC GLUCOMTR-MCNC: 167 MG/DL (ref 70–99)
GLUCOSE BLDC GLUCOMTR-MCNC: 168 MG/DL (ref 70–99)
GLUCOSE BLDC GLUCOMTR-MCNC: 169 MG/DL (ref 70–99)
GLUCOSE BLDC GLUCOMTR-MCNC: 169 MG/DL (ref 70–99)
GLUCOSE BLDC GLUCOMTR-MCNC: 170 MG/DL (ref 70–99)
GLUCOSE BLDC GLUCOMTR-MCNC: 171 MG/DL (ref 70–99)
GLUCOSE BLDC GLUCOMTR-MCNC: 171 MG/DL (ref 70–99)
GLUCOSE BLDC GLUCOMTR-MCNC: 172 MG/DL (ref 70–99)
GLUCOSE BLDC GLUCOMTR-MCNC: 173 MG/DL (ref 70–99)
GLUCOSE BLDC GLUCOMTR-MCNC: 173 MG/DL (ref 70–99)
GLUCOSE BLDC GLUCOMTR-MCNC: 174 MG/DL (ref 70–99)
GLUCOSE BLDC GLUCOMTR-MCNC: 175 MG/DL (ref 70–99)
GLUCOSE BLDC GLUCOMTR-MCNC: 176 MG/DL (ref 70–99)
GLUCOSE BLDC GLUCOMTR-MCNC: 177 MG/DL (ref 70–99)
GLUCOSE BLDC GLUCOMTR-MCNC: 177 MG/DL (ref 70–99)
GLUCOSE BLDC GLUCOMTR-MCNC: 182 MG/DL (ref 70–99)
GLUCOSE BLDC GLUCOMTR-MCNC: 184 MG/DL (ref 70–99)
GLUCOSE BLDC GLUCOMTR-MCNC: 188 MG/DL (ref 70–99)
GLUCOSE BLDC GLUCOMTR-MCNC: 189 MG/DL (ref 70–99)
GLUCOSE BLDC GLUCOMTR-MCNC: 190 MG/DL (ref 70–99)
GLUCOSE BLDC GLUCOMTR-MCNC: 193 MG/DL (ref 70–99)
GLUCOSE BLDC GLUCOMTR-MCNC: 194 MG/DL (ref 70–99)
GLUCOSE BLDC GLUCOMTR-MCNC: 194 MG/DL (ref 70–99)
GLUCOSE BLDC GLUCOMTR-MCNC: 196 MG/DL (ref 70–99)
GLUCOSE BLDC GLUCOMTR-MCNC: 198 MG/DL (ref 70–99)
GLUCOSE BLDC GLUCOMTR-MCNC: 199 MG/DL (ref 70–99)
GLUCOSE BLDC GLUCOMTR-MCNC: 200 MG/DL (ref 70–99)
GLUCOSE BLDC GLUCOMTR-MCNC: 204 MG/DL (ref 70–99)
GLUCOSE BLDC GLUCOMTR-MCNC: 211 MG/DL (ref 70–99)
GLUCOSE BLDC GLUCOMTR-MCNC: 211 MG/DL (ref 70–99)
GLUCOSE BLDC GLUCOMTR-MCNC: 216 MG/DL (ref 70–99)
GLUCOSE BLDC GLUCOMTR-MCNC: 220 MG/DL (ref 70–99)
GLUCOSE BLDC GLUCOMTR-MCNC: 220 MG/DL (ref 70–99)
GLUCOSE BLDC GLUCOMTR-MCNC: 229 MG/DL (ref 70–99)
GLUCOSE BLDC GLUCOMTR-MCNC: 240 MG/DL (ref 70–99)
GLUCOSE BLDC GLUCOMTR-MCNC: 241 MG/DL (ref 70–99)
GLUCOSE BLDC GLUCOMTR-MCNC: 248 MG/DL (ref 70–99)
GLUCOSE BLDC GLUCOMTR-MCNC: 68 MG/DL (ref 70–99)
GLUCOSE BLDC GLUCOMTR-MCNC: 99 MG/DL (ref 70–99)
GLUCOSE UR-MCNC: NEGATIVE MG/DL
GLUCOSE UR-MCNC: NEGATIVE MG/DL
HBV CORE AB SERPL QL IA: NONREACTIVE
HBV SURFACE AB SER QL: NONREACTIVE
HBV SURFACE AB SERPL IA-ACNC: <3.1 MIU/ML
HBV SURFACE AG SER-ACNC: <0.1 [IU]/L
HBV SURFACE AG SERPL QL IA: NONREACTIVE
HCO3 BLDA-SCNC: 20.1 MEQ/L (ref 21–27)
HCO3 BLDA-SCNC: 20.3 MEQ/L (ref 21–27)
HCO3 BLDA-SCNC: 22.6 MEQ/L (ref 21–27)
HCO3 BLDA-SCNC: 25.3 MEQ/L (ref 21–27)
HCO3 BLDA-SCNC: 25.5 MEQ/L (ref 21–27)
HCO3 BLDA-SCNC: 25.7 MEQ/L (ref 21–27)
HCO3 BLDA-SCNC: 26.1 MEQ/L (ref 21–27)
HCO3 BLDA-SCNC: 26.2 MEQ/L (ref 21–27)
HCO3 BLDA-SCNC: 26.4 MEQ/L (ref 21–27)
HCO3 BLDA-SCNC: 26.4 MEQ/L (ref 21–27)
HCO3 BLDA-SCNC: 26.8 MEQ/L (ref 21–27)
HCO3 BLDA-SCNC: 27.8 MEQ/L (ref 21–27)
HCO3 BLDA-SCNC: 31.3 MEQ/L (ref 21–27)
HCO3 BLDV-SCNC: 24 MEQ/L (ref 22–26)
HCO3 BLDV-SCNC: 26.9 MEQ/L (ref 22–26)
HCO3 BLDV-SCNC: 27 MEQ/L (ref 22–26)
HCT VFR BLD AUTO: 19 %
HCT VFR BLD AUTO: 22.1 %
HCT VFR BLD AUTO: 22.6 %
HCT VFR BLD AUTO: 22.6 %
HCT VFR BLD AUTO: 22.8 %
HCT VFR BLD AUTO: 22.9 %
HCT VFR BLD AUTO: 23.2 %
HCT VFR BLD AUTO: 23.4 %
HCT VFR BLD AUTO: 23.6 %
HCT VFR BLD AUTO: 23.7 %
HCT VFR BLD AUTO: 23.8 %
HCT VFR BLD AUTO: 24.4 %
HCT VFR BLD AUTO: 24.4 %
HCT VFR BLD AUTO: 24.6 %
HCT VFR BLD AUTO: 24.7 %
HCT VFR BLD AUTO: 24.7 %
HCT VFR BLD AUTO: 24.8 %
HCT VFR BLD AUTO: 25.6 %
HCT VFR BLD AUTO: 26 %
HCT VFR BLD AUTO: 26.2 %
HCT VFR BLD AUTO: 26.3 %
HCT VFR BLD AUTO: 26.4 %
HCT VFR BLD AUTO: 26.9 %
HCT VFR BLD AUTO: 26.9 %
HCT VFR BLD AUTO: 27.2 %
HCT VFR BLD AUTO: 27.3 %
HCT VFR BLD AUTO: 27.7 %
HCT VFR BLD AUTO: 28.1 %
HCT VFR BLD AUTO: 28.1 %
HCT VFR BLD AUTO: 28.2 %
HCT VFR BLD AUTO: 28.3 %
HCT VFR BLD AUTO: 28.7 %
HCT VFR BLD AUTO: 33.6 %
HCT VFR BLD AUTO: 39.8 %
HCT VFR BLD AUTO: 40.1 %
HCT VFR BLD AUTO: 40.5 %
HCT VFR BLD AUTO: 40.7 %
HCT VFR BLD AUTO: 40.9 %
HCT VFR BLD AUTO: 42.1 %
HCT VFR BLD AUTO: 42.3 %
HCT VFR BLD AUTO: 42.4 %
HCT VFR BLD AUTO: 42.6 %
HCT VFR BLD AUTO: 42.9 %
HCT VFR BLD AUTO: 43 %
HCT VFR BLD AUTO: 43.4 %
HCT VFR BLD AUTO: 44.1 %
HCT VFR BLD AUTO: 44.4 %
HCT VFR BLD AUTO: 44.5 %
HCT VFR BLD AUTO: 45.9 %
HGB BLD-MCNC: 11.4 G/DL
HGB BLD-MCNC: 13.2 G/DL
HGB BLD-MCNC: 13.4 G/DL
HGB BLD-MCNC: 13.5 G/DL
HGB BLD-MCNC: 13.6 G/DL
HGB BLD-MCNC: 14 G/DL
HGB BLD-MCNC: 14.2 G/DL
HGB BLD-MCNC: 14.3 G/DL
HGB BLD-MCNC: 14.5 G/DL
HGB BLD-MCNC: 14.7 G/DL (ref 12–16)
HGB BLD-MCNC: 14.8 G/DL
HGB BLD-MCNC: 15 G/DL
HGB BLD-MCNC: 15.2 G/DL
HGB BLD-MCNC: 15.4 G/DL
HGB BLD-MCNC: 15.4 G/DL
HGB BLD-MCNC: 15.5 G/DL
HGB BLD-MCNC: 15.6 G/DL
HGB BLD-MCNC: 15.9 G/DL
HGB BLD-MCNC: 16.4 G/DL
HGB BLD-MCNC: 6.2 G/DL
HGB BLD-MCNC: 6.6 G/DL
HGB BLD-MCNC: 7 G/DL
HGB BLD-MCNC: 7.1 G/DL
HGB BLD-MCNC: 7.2 G/DL
HGB BLD-MCNC: 7.2 G/DL
HGB BLD-MCNC: 7.3 G/DL
HGB BLD-MCNC: 7.3 G/DL
HGB BLD-MCNC: 7.4 G/DL
HGB BLD-MCNC: 7.5 G/DL
HGB BLD-MCNC: 7.6 G/DL
HGB BLD-MCNC: 7.7 G/DL
HGB BLD-MCNC: 7.8 G/DL
HGB BLD-MCNC: 7.8 G/DL
HGB BLD-MCNC: 7.9 G/DL
HGB BLD-MCNC: 8 G/DL
HGB BLD-MCNC: 8.2 G/DL
HGB BLD-MCNC: 8.2 G/DL
HGB BLD-MCNC: 8.4 G/DL
HGB BLD-MCNC: 8.5 G/DL
HGB BLD-MCNC: 8.5 G/DL
HGB BLD-MCNC: 9.5 G/DL
HGB BLD-MCNC: 9.7 G/DL
HGB BLD-MCNC: 9.9 G/DL
HSV TYPE 1/2 COMBINED ABS, IGM: 1.92 IV
IMM GRANULOCYTES # BLD AUTO: 0.02 X10(3) UL (ref 0–1)
IMM GRANULOCYTES # BLD AUTO: 0.04 X10(3) UL (ref 0–1)
IMM GRANULOCYTES # BLD AUTO: 0.05 X10(3) UL (ref 0–1)
IMM GRANULOCYTES # BLD AUTO: 0.05 X10(3) UL (ref 0–1)
IMM GRANULOCYTES # BLD AUTO: 0.1 X10(3) UL (ref 0–1)
IMM GRANULOCYTES # BLD AUTO: 0.1 X10(3) UL (ref 0–1)
IMM GRANULOCYTES # BLD AUTO: 0.11 X10(3) UL (ref 0–1)
IMM GRANULOCYTES # BLD AUTO: 0.12 X10(3) UL (ref 0–1)
IMM GRANULOCYTES # BLD AUTO: 0.12 X10(3) UL (ref 0–1)
IMM GRANULOCYTES # BLD AUTO: 0.13 X10(3) UL (ref 0–1)
IMM GRANULOCYTES # BLD AUTO: 0.15 X10(3) UL (ref 0–1)
IMM GRANULOCYTES # BLD AUTO: 0.17 X10(3) UL (ref 0–1)
IMM GRANULOCYTES # BLD AUTO: 0.18 X10(3) UL (ref 0–1)
IMM GRANULOCYTES # BLD AUTO: 0.23 X10(3) UL (ref 0–1)
IMM GRANULOCYTES # BLD AUTO: 0.34 X10(3) UL (ref 0–1)
IMM GRANULOCYTES # BLD AUTO: 0.4 X10(3) UL (ref 0–1)
IMM GRANULOCYTES # BLD AUTO: 0.41 X10(3) UL (ref 0–1)
IMM GRANULOCYTES # BLD AUTO: 0.46 X10(3) UL (ref 0–1)
IMM GRANULOCYTES # BLD AUTO: 0.48 X10(3) UL (ref 0–1)
IMM GRANULOCYTES NFR BLD: 0.5 %
IMM GRANULOCYTES NFR BLD: 0.5 %
IMM GRANULOCYTES NFR BLD: 0.6 %
IMM GRANULOCYTES NFR BLD: 0.7 %
IMM GRANULOCYTES NFR BLD: 0.8 %
IMM GRANULOCYTES NFR BLD: 0.9 %
IMM GRANULOCYTES NFR BLD: 0.9 %
IMM GRANULOCYTES NFR BLD: 1 %
IMM GRANULOCYTES NFR BLD: 1 %
IMM GRANULOCYTES NFR BLD: 1.5 %
IMM GRANULOCYTES NFR BLD: 1.6 %
IMM GRANULOCYTES NFR BLD: 1.9 %
IMM GRANULOCYTES NFR BLD: 2 %
IMM GRANULOCYTES NFR BLD: 2.1 %
INSPIRATION SETTING TIME VENT: 0.7 SEC (ref 0.1–5)
INSPIRATION SETTING TIME VENT: 0.8 SEC (ref 0.1–5)
KETONES UR-MCNC: NEGATIVE MG/DL
KETONES UR-MCNC: NEGATIVE MG/DL
LACTATE BLD-SCNC: 1.6 MMOL/L (ref 0.5–2)
LDH SERPL L TO P-CCNC: 446 U/L
LDH SERPL L TO P-CCNC: 490 U/L
LDH SERPL L TO P-CCNC: 494 U/L
LDH SERPL L TO P-CCNC: 527 U/L
LDH SERPL L TO P-CCNC: 768 U/L
LIPASE SERPL-CCNC: 227 U/L (ref 73–393)
LYMPHOCYTES # BLD AUTO: 0.21 X10(3) UL (ref 1–4)
LYMPHOCYTES # BLD AUTO: 0.28 X10(3) UL (ref 1–4)
LYMPHOCYTES # BLD AUTO: 0.29 X10(3) UL (ref 1–4)
LYMPHOCYTES # BLD AUTO: 0.3 X10(3) UL (ref 1–4)
LYMPHOCYTES # BLD AUTO: 0.35 X10(3) UL (ref 1–4)
LYMPHOCYTES # BLD AUTO: 0.38 X10(3) UL (ref 1–4)
LYMPHOCYTES # BLD AUTO: 0.38 X10(3) UL (ref 1–4)
LYMPHOCYTES # BLD AUTO: 0.42 X10(3) UL (ref 1–4)
LYMPHOCYTES # BLD AUTO: 0.47 X10(3) UL (ref 1–4)
LYMPHOCYTES # BLD AUTO: 0.48 X10(3) UL (ref 1–4)
LYMPHOCYTES # BLD AUTO: 0.48 X10(3) UL (ref 1–4)
LYMPHOCYTES # BLD AUTO: 0.55 X10(3) UL (ref 1–4)
LYMPHOCYTES # BLD AUTO: 0.59 X10(3) UL (ref 1–4)
LYMPHOCYTES # BLD AUTO: 0.6 X10(3) UL (ref 1–4)
LYMPHOCYTES # BLD AUTO: 0.63 X10(3) UL (ref 1–4)
LYMPHOCYTES # BLD AUTO: 0.68 X10(3) UL (ref 1–4)
LYMPHOCYTES # BLD AUTO: 0.73 X10(3) UL (ref 1–4)
LYMPHOCYTES # BLD AUTO: 0.8 X10(3) UL (ref 1–4)
LYMPHOCYTES NFR BLD AUTO: 1.4 %
LYMPHOCYTES NFR BLD AUTO: 1.6 %
LYMPHOCYTES NFR BLD AUTO: 1.7 %
LYMPHOCYTES NFR BLD AUTO: 2.2 %
LYMPHOCYTES NFR BLD AUTO: 2.3 %
LYMPHOCYTES NFR BLD AUTO: 2.5 %
LYMPHOCYTES NFR BLD AUTO: 2.6 %
LYMPHOCYTES NFR BLD AUTO: 2.7 %
LYMPHOCYTES NFR BLD AUTO: 2.8 %
LYMPHOCYTES NFR BLD AUTO: 3.1 %
LYMPHOCYTES NFR BLD AUTO: 3.2 %
LYMPHOCYTES NFR BLD AUTO: 3.3 %
LYMPHOCYTES NFR BLD AUTO: 3.6 %
LYMPHOCYTES NFR BLD AUTO: 4.5 %
LYMPHOCYTES NFR BLD AUTO: 4.9 %
LYMPHOCYTES NFR BLD AUTO: 5.6 %
LYMPHOCYTES NFR BLD AUTO: 6 %
LYMPHOCYTES NFR BLD AUTO: 9.2 %
LYMPHOCYTES NFR BLD: 0.43 X10(3) UL (ref 1–4)
LYMPHOCYTES NFR BLD: 0.82 X10(3) UL (ref 1–4)
LYMPHOCYTES NFR BLD: 0.92 X10(3) UL (ref 1–4)
LYMPHOCYTES NFR BLD: 0.97 X10(3) UL (ref 1–4)
LYMPHOCYTES NFR BLD: 1 %
LYMPHOCYTES NFR BLD: 1.29 X10(3) UL (ref 1–4)
LYMPHOCYTES NFR BLD: 1.4 X10(3) UL (ref 1–4)
LYMPHOCYTES NFR BLD: 1.48 X10(3) UL (ref 1–4)
LYMPHOCYTES NFR BLD: 1.76 X10(3) UL (ref 1–4)
LYMPHOCYTES NFR BLD: 10 %
LYMPHOCYTES NFR BLD: 11 %
LYMPHOCYTES NFR BLD: 11 %
LYMPHOCYTES NFR BLD: 2.03 X10(3) UL (ref 1–4)
LYMPHOCYTES NFR BLD: 2.13 X10(3) UL (ref 1–4)
LYMPHOCYTES NFR BLD: 2.13 X10(3) UL (ref 1–4)
LYMPHOCYTES NFR BLD: 2.4 X10(3) UL (ref 1–4)
LYMPHOCYTES NFR BLD: 2.91 X10(3) UL (ref 1–4)
LYMPHOCYTES NFR BLD: 2.93 X10(3) UL (ref 1–4)
LYMPHOCYTES NFR BLD: 2.96 X10(3) UL (ref 1–4)
LYMPHOCYTES NFR BLD: 21 %
LYMPHOCYTES NFR BLD: 3 %
LYMPHOCYTES NFR BLD: 3 %
LYMPHOCYTES NFR BLD: 3.65 X10(3) UL (ref 1–4)
LYMPHOCYTES NFR BLD: 4 %
LYMPHOCYTES NFR BLD: 4 %
LYMPHOCYTES NFR BLD: 5 %
LYMPHOCYTES NFR BLD: 5.29 X10(3) UL (ref 1–4)
LYMPHOCYTES NFR BLD: 5.58 X10(3) UL (ref 1–4)
LYMPHOCYTES NFR BLD: 6 %
LYMPHOCYTES NFR BLD: 6.2 X10(3) UL (ref 1–4)
LYMPHOCYTES NFR BLD: 7 %
LYMPHOCYTES NFR BLD: 7 %
LYMPHOCYTES NFR BLD: 8 %
LYMPHOCYTES NFR BLD: 8 %
LYMPHOCYTES NFR BLD: 9 %
MAGNESIUM SERPL-MCNC: 1.5 MG/DL (ref 1.6–2.6)
MAGNESIUM SERPL-MCNC: 1.6 MG/DL (ref 1.6–2.6)
MAGNESIUM SERPL-MCNC: 1.7 MG/DL (ref 1.6–2.6)
MAGNESIUM SERPL-MCNC: 1.7 MG/DL (ref 1.7–2.8)
MAGNESIUM SERPL-MCNC: 1.8 MG/DL (ref 1.6–2.6)
MAGNESIUM SERPL-MCNC: 1.8 MG/DL (ref 1.7–2.8)
MAGNESIUM SERPL-MCNC: 1.9 MG/DL (ref 1.6–2.6)
MAGNESIUM SERPL-MCNC: 2 MG/DL (ref 1.6–2.6)
MAGNESIUM SERPL-MCNC: 2.1 MG/DL (ref 1.6–2.6)
MAGNESIUM SERPL-MCNC: 2.2 MG/DL (ref 1.6–2.6)
MAGNESIUM SERPL-MCNC: 2.3 MG/DL (ref 1.6–2.6)
MAGNESIUM SERPL-MCNC: 2.5 MG/DL (ref 1.6–2.6)
MCH RBC QN AUTO: 31.1 PG (ref 26–34)
MCH RBC QN AUTO: 31.1 PG (ref 26–34)
MCH RBC QN AUTO: 31.3 PG (ref 26–34)
MCH RBC QN AUTO: 31.6 PG (ref 26–34)
MCH RBC QN AUTO: 31.7 PG (ref 26–34)
MCH RBC QN AUTO: 31.7 PG (ref 26–34)
MCH RBC QN AUTO: 31.8 PG (ref 26–34)
MCH RBC QN AUTO: 31.9 PG (ref 26–34)
MCH RBC QN AUTO: 32.1 PG (ref 26–34)
MCH RBC QN AUTO: 32.2 PG (ref 26–34)
MCH RBC QN AUTO: 32.2 PG (ref 26–34)
MCH RBC QN AUTO: 32.3 PG (ref 26–34)
MCH RBC QN AUTO: 32.3 PG (ref 26–34)
MCH RBC QN AUTO: 32.4 PG (ref 26–34)
MCH RBC QN AUTO: 32.5 PG (ref 26–34)
MCH RBC QN AUTO: 32.5 PG (ref 26–34)
MCH RBC QN AUTO: 32.6 PG (ref 26–34)
MCH RBC QN AUTO: 32.6 PG (ref 26–34)
MCH RBC QN AUTO: 32.8 PG (ref 26–34)
MCH RBC QN AUTO: 32.8 PG (ref 26–34)
MCH RBC QN AUTO: 32.9 PG (ref 26–34)
MCH RBC QN AUTO: 33 PG (ref 26–34)
MCH RBC QN AUTO: 33.2 PG (ref 26–34)
MCH RBC QN AUTO: 33.2 PG (ref 26–34)
MCH RBC QN AUTO: 33.3 PG (ref 26–34)
MCH RBC QN AUTO: 33.3 PG (ref 26–34)
MCH RBC QN AUTO: 33.5 PG (ref 26–34)
MCH RBC QN AUTO: 33.6 PG (ref 26–34)
MCH RBC QN AUTO: 33.6 PG (ref 26–34)
MCH RBC QN AUTO: 33.7 PG (ref 26–34)
MCH RBC QN AUTO: 34 PG (ref 26–34)
MCH RBC QN AUTO: 34 PG (ref 26–34)
MCH RBC QN AUTO: 34.1 PG (ref 26–34)
MCH RBC QN AUTO: 34.4 PG (ref 26–34)
MCH RBC QN AUTO: 34.5 PG (ref 26–34)
MCH RBC QN AUTO: 34.5 PG (ref 26–34)
MCH RBC QN AUTO: 34.6 PG (ref 26–34)
MCH RBC QN AUTO: 34.7 PG (ref 26–34)
MCH RBC QN AUTO: 34.8 PG (ref 26–34)
MCH RBC QN AUTO: 34.9 PG (ref 26–34)
MCHC RBC AUTO-ENTMCNC: 27 G/DL (ref 31–37)
MCHC RBC AUTO-ENTMCNC: 27.7 G/DL (ref 31–37)
MCHC RBC AUTO-ENTMCNC: 27.8 G/DL (ref 31–37)
MCHC RBC AUTO-ENTMCNC: 27.9 G/DL (ref 31–37)
MCHC RBC AUTO-ENTMCNC: 28.2 G/DL (ref 31–37)
MCHC RBC AUTO-ENTMCNC: 28.2 G/DL (ref 31–37)
MCHC RBC AUTO-ENTMCNC: 28.3 G/DL (ref 31–37)
MCHC RBC AUTO-ENTMCNC: 28.4 G/DL (ref 31–37)
MCHC RBC AUTO-ENTMCNC: 28.4 G/DL (ref 31–37)
MCHC RBC AUTO-ENTMCNC: 28.6 G/DL (ref 31–37)
MCHC RBC AUTO-ENTMCNC: 28.7 G/DL (ref 31–37)
MCHC RBC AUTO-ENTMCNC: 28.9 G/DL (ref 31–37)
MCHC RBC AUTO-ENTMCNC: 29 G/DL (ref 31–37)
MCHC RBC AUTO-ENTMCNC: 29.1 G/DL (ref 31–37)
MCHC RBC AUTO-ENTMCNC: 29.1 G/DL (ref 31–37)
MCHC RBC AUTO-ENTMCNC: 29.2 G/DL (ref 31–37)
MCHC RBC AUTO-ENTMCNC: 29.4 G/DL (ref 31–37)
MCHC RBC AUTO-ENTMCNC: 29.6 G/DL (ref 31–37)
MCHC RBC AUTO-ENTMCNC: 29.7 G/DL (ref 31–37)
MCHC RBC AUTO-ENTMCNC: 30 G/DL (ref 31–37)
MCHC RBC AUTO-ENTMCNC: 30.3 G/DL (ref 31–37)
MCHC RBC AUTO-ENTMCNC: 30.7 G/DL (ref 31–37)
MCHC RBC AUTO-ENTMCNC: 30.8 G/DL (ref 31–37)
MCHC RBC AUTO-ENTMCNC: 31.4 G/DL (ref 31–37)
MCHC RBC AUTO-ENTMCNC: 31.8 G/DL (ref 31–37)
MCHC RBC AUTO-ENTMCNC: 31.9 G/DL (ref 31–37)
MCHC RBC AUTO-ENTMCNC: 31.9 G/DL (ref 31–37)
MCHC RBC AUTO-ENTMCNC: 32.6 G/DL (ref 31–37)
MCHC RBC AUTO-ENTMCNC: 32.6 G/DL (ref 31–37)
MCHC RBC AUTO-ENTMCNC: 32.9 G/DL (ref 31–37)
MCHC RBC AUTO-ENTMCNC: 32.9 G/DL (ref 31–37)
MCHC RBC AUTO-ENTMCNC: 33.3 G/DL (ref 31–37)
MCHC RBC AUTO-ENTMCNC: 33.6 G/DL (ref 31–37)
MCHC RBC AUTO-ENTMCNC: 33.8 G/DL (ref 31–37)
MCHC RBC AUTO-ENTMCNC: 33.9 G/DL (ref 31–37)
MCHC RBC AUTO-ENTMCNC: 34 G/DL (ref 31–37)
MCHC RBC AUTO-ENTMCNC: 34.3 G/DL (ref 31–37)
MCHC RBC AUTO-ENTMCNC: 34.5 G/DL (ref 31–37)
MCHC RBC AUTO-ENTMCNC: 34.9 G/DL (ref 31–37)
MCHC RBC AUTO-ENTMCNC: 35 G/DL (ref 31–37)
MCHC RBC AUTO-ENTMCNC: 35.5 G/DL (ref 31–37)
MCHC RBC AUTO-ENTMCNC: 35.7 G/DL (ref 31–37)
MCHC RBC AUTO-ENTMCNC: 35.7 G/DL (ref 31–37)
MCHC RBC AUTO-ENTMCNC: 35.8 G/DL (ref 31–37)
MCHC RBC AUTO-ENTMCNC: 36.3 G/DL (ref 31–37)
MCV RBC AUTO: 102.3 FL
MCV RBC AUTO: 102.7 FL
MCV RBC AUTO: 103.3 FL
MCV RBC AUTO: 104.9 FL
MCV RBC AUTO: 107 FL
MCV RBC AUTO: 107.4 FL
MCV RBC AUTO: 110.6 FL
MCV RBC AUTO: 111.9 FL
MCV RBC AUTO: 113.4 FL
MCV RBC AUTO: 113.9 FL
MCV RBC AUTO: 114.7 FL
MCV RBC AUTO: 115.9 FL
MCV RBC AUTO: 116.2 FL
MCV RBC AUTO: 116.8 FL
MCV RBC AUTO: 117 FL
MCV RBC AUTO: 117.5 FL
MCV RBC AUTO: 117.5 FL
MCV RBC AUTO: 118 FL
MCV RBC AUTO: 118.3 FL
MCV RBC AUTO: 118.7 FL
MCV RBC AUTO: 120.3 FL
MCV RBC AUTO: 120.5 FL
MCV RBC AUTO: 120.6 FL
MCV RBC AUTO: 121.4 FL
MCV RBC AUTO: 122.1 FL
MCV RBC AUTO: 124.3 FL
MCV RBC AUTO: 125.8 FL
MCV RBC AUTO: 87.6 FL
MCV RBC AUTO: 88.9 FL
MCV RBC AUTO: 89.1 FL
MCV RBC AUTO: 89.2 FL
MCV RBC AUTO: 89.2 FL
MCV RBC AUTO: 89.7 FL
MCV RBC AUTO: 90 FL
MCV RBC AUTO: 90 FL
MCV RBC AUTO: 90.3 FL
MCV RBC AUTO: 90.6 FL
MCV RBC AUTO: 93.4 FL
MCV RBC AUTO: 93.6 FL
MCV RBC AUTO: 94.4 FL
MCV RBC AUTO: 94.4 FL
MCV RBC AUTO: 94.9 FL
MCV RBC AUTO: 95.2 FL
MCV RBC AUTO: 95.5 FL
MCV RBC AUTO: 96.4 FL
MCV RBC AUTO: 96.8 FL
MCV RBC AUTO: 97 FL
MCV RBC AUTO: 97.8 FL
MCV RBC AUTO: 97.9 FL
MCV RBC AUTO: 99.3 FL
MCV RBC AUTO: 99.6 FL
METAMYELOCYTES # BLD: 0.34 X10(3) UL
METAMYELOCYTES # BLD: 0.42 X10(3) UL
METAMYELOCYTES # BLD: 0.43 X10(3) UL
METAMYELOCYTES # BLD: 0.43 X10(3) UL
METAMYELOCYTES # BLD: 0.8 X10(3) UL
METAMYELOCYTES # BLD: 0.91 X10(3) UL
METAMYELOCYTES # BLD: 1.18 X10(3) UL
METAMYELOCYTES # BLD: 1.22 X10(3) UL
METAMYELOCYTES # BLD: 1.48 X10(3) UL
METAMYELOCYTES # BLD: 1.52 X10(3) UL
METAMYELOCYTES # BLD: 2.2 X10(3) UL
METAMYELOCYTES # BLD: 3.02 X10(3) UL
METAMYELOCYTES NFR BLD: 1 %
METAMYELOCYTES NFR BLD: 2 %
METAMYELOCYTES NFR BLD: 3 %
METAMYELOCYTES NFR BLD: 5 %
METAMYELOCYTES NFR BLD: 6 %
METAMYELOCYTES NFR BLD: 6 %
METAMYELOCYTES NFR BLD: 7 %
METHGB MFR BLD: 1.2 % SAT (ref 0.4–1.5)
METHGB MFR BLD: 1.4 % SAT (ref 0.4–1.5)
MODIFIED ALLEN TEST: POSITIVE
MONOCYTES # BLD AUTO: 0.29 X10(3) UL (ref 0.1–1)
MONOCYTES # BLD AUTO: 0.6 X10(3) UL (ref 0.1–1)
MONOCYTES # BLD AUTO: 0.7 X10(3) UL (ref 0.1–1)
MONOCYTES # BLD AUTO: 0.72 X10(3) UL (ref 0.1–1)
MONOCYTES # BLD AUTO: 0.75 X10(3) UL (ref 0.1–1)
MONOCYTES # BLD AUTO: 0.76 X10(3) UL (ref 0.1–1)
MONOCYTES # BLD AUTO: 0.78 X10(3) UL (ref 0.1–1)
MONOCYTES # BLD AUTO: 0.81 X10(3) UL (ref 0.1–1)
MONOCYTES # BLD AUTO: 0.94 X10(3) UL (ref 0.1–1)
MONOCYTES # BLD AUTO: 0.97 X10(3) UL (ref 0.1–1)
MONOCYTES # BLD AUTO: 1.03 X10(3) UL (ref 0.1–1)
MONOCYTES # BLD AUTO: 1.04 X10(3) UL (ref 0.1–1)
MONOCYTES # BLD AUTO: 1.13 X10(3) UL (ref 0.1–1)
MONOCYTES # BLD AUTO: 1.13 X10(3) UL (ref 0.1–1)
MONOCYTES # BLD AUTO: 1.28 X10(3) UL (ref 0.1–1)
MONOCYTES # BLD AUTO: 1.71 X10(3) UL (ref 0.1–1)
MONOCYTES # BLD AUTO: 1.98 X10(3) UL (ref 0.1–1)
MONOCYTES # BLD: 0.49 X10(3) UL (ref 0.1–1)
MONOCYTES # BLD: 1.08 X10(3) UL (ref 0.1–1)
MONOCYTES # BLD: 1.22 X10(3) UL (ref 0.1–1)
MONOCYTES # BLD: 1.29 X10(3) UL (ref 0.1–1)
MONOCYTES # BLD: 1.37 X10(3) UL (ref 0.1–1)
MONOCYTES # BLD: 1.48 X10(3) UL (ref 0.1–1)
MONOCYTES # BLD: 1.76 X10(3) UL (ref 0.1–1)
MONOCYTES # BLD: 1.76 X10(3) UL (ref 0.1–1)
MONOCYTES # BLD: 1.83 X10(3) UL (ref 0.1–1)
MONOCYTES # BLD: 1.85 X10(3) UL (ref 0.1–1)
MONOCYTES # BLD: 2.03 X10(3) UL (ref 0.1–1)
MONOCYTES # BLD: 2.06 X10(3) UL (ref 0.1–1)
MONOCYTES # BLD: 2.08 X10(3) UL (ref 0.1–1)
MONOCYTES # BLD: 2.23 X10(3) UL (ref 0.1–1)
MONOCYTES # BLD: 2.36 X10(3) UL (ref 0.1–1)
MONOCYTES # BLD: 2.64 X10(3) UL (ref 0.1–1)
MONOCYTES # BLD: 2.93 X10(3) UL (ref 0.1–1)
MONOCYTES # BLD: 3.02 X10(3) UL (ref 0.1–1)
MONOCYTES # BLD: 3.83 X10(3) UL (ref 0.1–1)
MONOCYTES NFR BLD AUTO: 11.6 %
MONOCYTES NFR BLD AUTO: 12.4 %
MONOCYTES NFR BLD AUTO: 12.7 %
MONOCYTES NFR BLD AUTO: 3.5 %
MONOCYTES NFR BLD AUTO: 4.1 %
MONOCYTES NFR BLD AUTO: 4.4 %
MONOCYTES NFR BLD AUTO: 4.5 %
MONOCYTES NFR BLD AUTO: 4.7 %
MONOCYTES NFR BLD AUTO: 4.8 %
MONOCYTES NFR BLD AUTO: 5.5 %
MONOCYTES NFR BLD AUTO: 5.6 %
MONOCYTES NFR BLD AUTO: 5.8 %
MONOCYTES NFR BLD AUTO: 5.9 %
MONOCYTES NFR BLD AUTO: 6 %
MONOCYTES NFR BLD AUTO: 6.4 %
MONOCYTES NFR BLD AUTO: 7 %
MONOCYTES NFR BLD AUTO: 7.3 %
MONOCYTES NFR BLD AUTO: 7.7 %
MONOCYTES NFR BLD AUTO: 9.8 %
MONOCYTES NFR BLD: 10 %
MONOCYTES NFR BLD: 11 %
MONOCYTES NFR BLD: 12 %
MONOCYTES NFR BLD: 2 %
MONOCYTES NFR BLD: 3 %
MONOCYTES NFR BLD: 3 %
MONOCYTES NFR BLD: 4 %
MONOCYTES NFR BLD: 5 %
MONOCYTES NFR BLD: 7 %
MONOCYTES NFR BLD: 8 %
MONOCYTES NFR BLD: 8 %
MONOCYTES NFR BLD: 9 %
MORPHOLOGY: NORMAL
MYELOCYTES # BLD: 0.24 X10(3) UL
MYELOCYTES # BLD: 0.3 X10(3) UL
MYELOCYTES # BLD: 0.41 X10(3) UL
MYELOCYTES # BLD: 0.43 X10(3) UL
MYELOCYTES # BLD: 0.51 X10(3) UL
MYELOCYTES # BLD: 0.59 X10(3) UL
MYELOCYTES # BLD: 0.69 X10(3) UL
MYELOCYTES # BLD: 1.33 X10(3) UL
MYELOCYTES # BLD: 1.46 X10(3) UL
MYELOCYTES # BLD: 1.72 X10(3) UL
MYELOCYTES # BLD: 2.35 X10(3) UL
MYELOCYTES NFR BLD: 1 %
MYELOCYTES NFR BLD: 2 %
MYELOCYTES NFR BLD: 4 %
MYELOCYTES NFR BLD: 5 %
NEUTROPHILS # BLD AUTO: 1.77 X10 (3) UL (ref 1.5–7.7)
NEUTROPHILS # BLD AUTO: 1.77 X10(3) UL (ref 1.5–7.7)
NEUTROPHILS # BLD AUTO: 12.52 X10 (3) UL (ref 1.5–7.7)
NEUTROPHILS # BLD AUTO: 12.52 X10(3) UL (ref 1.5–7.7)
NEUTROPHILS # BLD AUTO: 13.84 X10 (3) UL (ref 1.5–7.7)
NEUTROPHILS # BLD AUTO: 13.84 X10(3) UL (ref 1.5–7.7)
NEUTROPHILS # BLD AUTO: 14.83 X10 (3) UL (ref 1.5–7.7)
NEUTROPHILS # BLD AUTO: 14.83 X10(3) UL (ref 1.5–7.7)
NEUTROPHILS # BLD AUTO: 15.08 X10 (3) UL (ref 1.5–7.7)
NEUTROPHILS # BLD AUTO: 15.08 X10(3) UL (ref 1.5–7.7)
NEUTROPHILS # BLD AUTO: 15.22 X10 (3) UL (ref 1.5–7.7)
NEUTROPHILS # BLD AUTO: 15.22 X10(3) UL (ref 1.5–7.7)
NEUTROPHILS # BLD AUTO: 15.38 X10 (3) UL (ref 1.5–7.7)
NEUTROPHILS # BLD AUTO: 15.38 X10(3) UL (ref 1.5–7.7)
NEUTROPHILS # BLD AUTO: 15.91 X10 (3) UL (ref 1.5–7.7)
NEUTROPHILS # BLD AUTO: 15.91 X10(3) UL (ref 1.5–7.7)
NEUTROPHILS # BLD AUTO: 16.8 X10 (3) UL (ref 1.5–7.7)
NEUTROPHILS # BLD AUTO: 16.82 X10 (3) UL (ref 1.5–7.7)
NEUTROPHILS # BLD AUTO: 16.82 X10(3) UL (ref 1.5–7.7)
NEUTROPHILS # BLD AUTO: 17.62 X10 (3) UL (ref 1.5–7.7)
NEUTROPHILS # BLD AUTO: 17.62 X10(3) UL (ref 1.5–7.7)
NEUTROPHILS # BLD AUTO: 17.76 X10 (3) UL (ref 1.5–7.7)
NEUTROPHILS # BLD AUTO: 18.31 X10 (3) UL (ref 1.5–7.7)
NEUTROPHILS # BLD AUTO: 18.31 X10(3) UL (ref 1.5–7.7)
NEUTROPHILS # BLD AUTO: 19.87 X10 (3) UL (ref 1.5–7.7)
NEUTROPHILS # BLD AUTO: 19.87 X10(3) UL (ref 1.5–7.7)
NEUTROPHILS # BLD AUTO: 19.93 X10 (3) UL (ref 1.5–7.7)
NEUTROPHILS # BLD AUTO: 19.93 X10(3) UL (ref 1.5–7.7)
NEUTROPHILS # BLD AUTO: 20.4 X10 (3) UL (ref 1.5–7.7)
NEUTROPHILS # BLD AUTO: 20.4 X10(3) UL (ref 1.5–7.7)
NEUTROPHILS # BLD AUTO: 20.85 X10 (3) UL (ref 1.5–7.7)
NEUTROPHILS # BLD AUTO: 21.73 X10 (3) UL (ref 1.5–7.7)
NEUTROPHILS # BLD AUTO: 21.81 X10 (3) UL (ref 1.5–7.7)
NEUTROPHILS # BLD AUTO: 21.83 X10 (3) UL (ref 1.5–7.7)
NEUTROPHILS # BLD AUTO: 21.83 X10(3) UL (ref 1.5–7.7)
NEUTROPHILS # BLD AUTO: 22.67 X10 (3) UL (ref 1.5–7.7)
NEUTROPHILS # BLD AUTO: 22.83 X10 (3) UL (ref 1.5–7.7)
NEUTROPHILS # BLD AUTO: 22.83 X10(3) UL (ref 1.5–7.7)
NEUTROPHILS # BLD AUTO: 23.1 X10 (3) UL (ref 1.5–7.7)
NEUTROPHILS # BLD AUTO: 24.24 X10 (3) UL (ref 1.5–7.7)
NEUTROPHILS # BLD AUTO: 26.87 X10 (3) UL (ref 1.5–7.7)
NEUTROPHILS # BLD AUTO: 27.59 X10 (3) UL (ref 1.5–7.7)
NEUTROPHILS # BLD AUTO: 3.9 X10 (3) UL (ref 1.5–7.7)
NEUTROPHILS # BLD AUTO: 3.9 X10(3) UL (ref 1.5–7.7)
NEUTROPHILS # BLD AUTO: 30.55 X10 (3) UL (ref 1.5–7.7)
NEUTROPHILS # BLD AUTO: 30.71 X10 (3) UL (ref 1.5–7.7)
NEUTROPHILS # BLD AUTO: 34.5 X10 (3) UL (ref 1.5–7.7)
NEUTROPHILS # BLD AUTO: 37.77 X10 (3) UL (ref 1.5–7.7)
NEUTROPHILS # BLD AUTO: 38.2 X10 (3) UL (ref 1.5–7.7)
NEUTROPHILS # BLD AUTO: 38.47 X10 (3) UL (ref 1.5–7.7)
NEUTROPHILS # BLD AUTO: 5.09 X10 (3) UL (ref 1.5–7.7)
NEUTROPHILS # BLD AUTO: 5.09 X10(3) UL (ref 1.5–7.7)
NEUTROPHILS # BLD AUTO: 6.52 X10 (3) UL (ref 1.5–7.7)
NEUTROPHILS # BLD AUTO: 6.52 X10(3) UL (ref 1.5–7.7)
NEUTROPHILS NFR BLD AUTO: 77.2 %
NEUTROPHILS NFR BLD AUTO: 80.6 %
NEUTROPHILS NFR BLD AUTO: 82 %
NEUTROPHILS NFR BLD AUTO: 84.8 %
NEUTROPHILS NFR BLD AUTO: 84.8 %
NEUTROPHILS NFR BLD AUTO: 85.3 %
NEUTROPHILS NFR BLD AUTO: 85.6 %
NEUTROPHILS NFR BLD AUTO: 86.8 %
NEUTROPHILS NFR BLD AUTO: 87 %
NEUTROPHILS NFR BLD AUTO: 87.7 %
NEUTROPHILS NFR BLD AUTO: 88 %
NEUTROPHILS NFR BLD AUTO: 88.4 %
NEUTROPHILS NFR BLD AUTO: 89.6 %
NEUTROPHILS NFR BLD AUTO: 90.7 %
NEUTROPHILS NFR BLD AUTO: 90.9 %
NEUTROPHILS NFR BLD AUTO: 90.9 %
NEUTROPHILS NFR BLD AUTO: 91.4 %
NEUTROPHILS NFR BLD AUTO: 91.7 %
NEUTROPHILS NFR BLD AUTO: 92.3 %
NEUTROPHILS NFR BLD: 55 %
NEUTROPHILS NFR BLD: 55 %
NEUTROPHILS NFR BLD: 56 %
NEUTROPHILS NFR BLD: 58 %
NEUTROPHILS NFR BLD: 58 %
NEUTROPHILS NFR BLD: 63 %
NEUTROPHILS NFR BLD: 64 %
NEUTROPHILS NFR BLD: 66 %
NEUTROPHILS NFR BLD: 71 %
NEUTROPHILS NFR BLD: 72 %
NEUTROPHILS NFR BLD: 72 %
NEUTROPHILS NFR BLD: 74 %
NEUTROPHILS NFR BLD: 75 %
NEUTROPHILS NFR BLD: 76 %
NEUTROPHILS NFR BLD: 76 %
NEUTROPHILS NFR BLD: 77 %
NEUTROPHILS NFR BLD: 83 %
NEUTS BAND NFR BLD: 10 %
NEUTS BAND NFR BLD: 10 %
NEUTS BAND NFR BLD: 11 %
NEUTS BAND NFR BLD: 13 %
NEUTS BAND NFR BLD: 13 %
NEUTS BAND NFR BLD: 14 %
NEUTS BAND NFR BLD: 17 %
NEUTS BAND NFR BLD: 19 %
NEUTS BAND NFR BLD: 23 %
NEUTS BAND NFR BLD: 27 %
NEUTS BAND NFR BLD: 3 %
NEUTS BAND NFR BLD: 4 %
NEUTS BAND NFR BLD: 4 %
NEUTS BAND NFR BLD: 7 %
NEUTS BAND NFR BLD: 7 %
NEUTS BAND NFR BLD: 8 %
NEUTS BAND NFR BLD: 9 %
NEUTS HYPERSEG # BLD: 12.32 X10(3) UL (ref 1.5–7.7)
NEUTS HYPERSEG # BLD: 14.01 X10(3) UL (ref 1.5–7.7)
NEUTS HYPERSEG # BLD: 17.72 X10(3) UL (ref 1.5–7.7)
NEUTS HYPERSEG # BLD: 18.29 X10(3) UL (ref 1.5–7.7)
NEUTS HYPERSEG # BLD: 19.15 X10(3) UL (ref 1.5–7.7)
NEUTS HYPERSEG # BLD: 22.6 X10(3) UL (ref 1.5–7.7)
NEUTS HYPERSEG # BLD: 22.87 X10(3) UL (ref 1.5–7.7)
NEUTS HYPERSEG # BLD: 23.31 X10(3) UL (ref 1.5–7.7)
NEUTS HYPERSEG # BLD: 24.27 X10(3) UL (ref 1.5–7.7)
NEUTS HYPERSEG # BLD: 24.32 X10(3) UL (ref 1.5–7.7)
NEUTS HYPERSEG # BLD: 28.18 X10(3) UL (ref 1.5–7.7)
NEUTS HYPERSEG # BLD: 29.5 X10(3) UL (ref 1.5–7.7)
NEUTS HYPERSEG # BLD: 34.86 X10(3) UL (ref 1.5–7.7)
NEUTS HYPERSEG # BLD: 35.7 X10(3) UL (ref 1.5–7.7)
NEUTS HYPERSEG # BLD: 35.77 X10(3) UL (ref 1.5–7.7)
NEUTS HYPERSEG # BLD: 36.2 X10(3) UL (ref 1.5–7.7)
NEUTS HYPERSEG # BLD: 39.55 X10(3) UL (ref 1.5–7.7)
NEUTS HYPERSEG # BLD: 39.69 X10(3) UL (ref 1.5–7.7)
NEUTS HYPERSEG # BLD: 42.92 X10(3) UL (ref 1.5–7.7)
NEUTS VAC BLD QL SMEAR: PRESENT
NITRIC OXIDE: 0 PPM
NITRIC OXIDE: 40 PPM
NITRIC OXIDE: 5 PPM
NITRIC OXIDE: 8 PPM
NITRITE UR QL STRIP.AUTO: NEGATIVE
NITRITE UR QL STRIP.AUTO: NEGATIVE
NRBC BLD MANUAL-RTO: 1 %
NRBC BLD MANUAL-RTO: 1 %
NRBC BLD MANUAL-RTO: 10 %
NRBC BLD MANUAL-RTO: 10 %
NRBC BLD MANUAL-RTO: 12 %
NRBC BLD MANUAL-RTO: 12 %
NRBC BLD MANUAL-RTO: 15 %
NRBC BLD MANUAL-RTO: 185 %
NRBC BLD MANUAL-RTO: 32 %
NRBC BLD MANUAL-RTO: 4 %
NRBC BLD MANUAL-RTO: 44 %
NRBC BLD MANUAL-RTO: 48 %
NRBC BLD MANUAL-RTO: 65 %
NRBC BLD MANUAL-RTO: 72 %
NRBC BLD MANUAL-RTO: 8 %
NRBC BLD MANUAL-RTO: 86 %
O2 CT BLD-SCNC: 10.4 VOL% (ref 15–23)
O2 CT BLD-SCNC: 10.8 VOL% (ref 15–23)
O2 CT BLD-SCNC: 13.1 VOL% (ref 15–23)
O2 CT BLD-SCNC: 16.1 VOL% (ref 15–23)
O2 CT BLD-SCNC: 17.9 VOL% (ref 15–23)
O2 CT BLD-SCNC: 18.7 VOL% (ref 15–23)
O2 CT BLD-SCNC: 18.8 VOL% (ref 15–23)
O2 CT BLD-SCNC: 19.1 VOL% (ref 15–23)
O2 CT BLD-SCNC: 19.4 VOL% (ref 15–23)
O2 CT BLD-SCNC: 19.6 VOL% (ref 15–23)
O2 CT BLD-SCNC: 19.7 VOL% (ref 15–23)
O2 CT BLD-SCNC: 20.3 VOL% (ref 15–23)
O2 CT BLD-SCNC: 8.8 VOL% (ref 15–23)
O2 CT BLD-SCNC: 9.2 VOL% (ref 15–23)
O2 CT BLD-SCNC: 9.7 VOL% (ref 15–23)
O2 CT BLD-SCNC: 9.8 VOL% (ref 15–23)
O2/TOTAL GAS SETTING VFR VENT: 100 %
O2/TOTAL GAS SETTING VFR VENT: 70 %
O2/TOTAL GAS SETTING VFR VENT: 70 %
O2/TOTAL GAS SETTING VFR VENT: 80 %
O2/TOTAL GAS SETTING VFR VENT: 85 %
O2/TOTAL GAS SETTING VFR VENT: 90 %
OSMOLALITY SERPL CALC.SUM OF ELEC: 266 MOSM/KG (ref 275–295)
OSMOLALITY SERPL CALC.SUM OF ELEC: 276 MOSM/KG (ref 275–295)
OSMOLALITY SERPL CALC.SUM OF ELEC: 281 MOSM/KG (ref 275–295)
OSMOLALITY SERPL CALC.SUM OF ELEC: 281 MOSM/KG (ref 275–295)
OSMOLALITY SERPL CALC.SUM OF ELEC: 284 MOSM/KG (ref 275–295)
OSMOLALITY SERPL CALC.SUM OF ELEC: 284 MOSM/KG (ref 275–295)
OSMOLALITY SERPL CALC.SUM OF ELEC: 285 MOSM/KG (ref 275–295)
OSMOLALITY SERPL CALC.SUM OF ELEC: 286 MOSM/KG (ref 275–295)
OSMOLALITY SERPL CALC.SUM OF ELEC: 287 MOSM/KG (ref 275–295)
OSMOLALITY SERPL CALC.SUM OF ELEC: 288 MOSM/KG (ref 275–295)
OSMOLALITY SERPL CALC.SUM OF ELEC: 289 MOSM/KG (ref 275–295)
OSMOLALITY SERPL CALC.SUM OF ELEC: 290 MOSM/KG (ref 275–295)
OSMOLALITY SERPL CALC.SUM OF ELEC: 291 MOSM/KG (ref 275–295)
OSMOLALITY SERPL CALC.SUM OF ELEC: 292 MOSM/KG (ref 275–295)
OSMOLALITY SERPL CALC.SUM OF ELEC: 293 MOSM/KG (ref 275–295)
OSMOLALITY SERPL CALC.SUM OF ELEC: 294 MOSM/KG (ref 275–295)
OSMOLALITY SERPL CALC.SUM OF ELEC: 295 MOSM/KG (ref 275–295)
OSMOLALITY SERPL CALC.SUM OF ELEC: 296 MOSM/KG (ref 275–295)
OSMOLALITY SERPL CALC.SUM OF ELEC: 297 MOSM/KG (ref 275–295)
OSMOLALITY SERPL CALC.SUM OF ELEC: 298 MOSM/KG (ref 275–295)
OSMOLALITY SERPL CALC.SUM OF ELEC: 298 MOSM/KG (ref 275–295)
OSMOLALITY SERPL CALC.SUM OF ELEC: 299 MOSM/KG (ref 275–295)
OSMOLALITY SERPL CALC.SUM OF ELEC: 299 MOSM/KG (ref 275–295)
OSMOLALITY SERPL CALC.SUM OF ELEC: 300 MOSM/KG (ref 275–295)
OSMOLALITY SERPL CALC.SUM OF ELEC: 300 MOSM/KG (ref 275–295)
OSMOLALITY UR: 329 MOSM/KG (ref 300–1100)
P. JIROVECII DETECTION BY PCR: NOT DETECTED
PAW @ PEAK INSP FLOW SETTING VENT: 27 CM H2O (ref 1–60)
PAW @ PEAK INSP FLOW SETTING VENT: 28 CM H2O (ref 1–60)
PCO2 BLDA: 116 MM HG (ref 35–45)
PCO2 BLDA: 119 MM HG (ref 35–45)
PCO2 BLDA: 119 MM HG (ref 35–45)
PCO2 BLDA: 46 MM HG (ref 35–45)
PCO2 BLDA: 54 MM HG (ref 35–45)
PCO2 BLDA: 58 MM HG (ref 35–45)
PCO2 BLDA: 60 MM HG (ref 35–45)
PCO2 BLDA: 62 MM HG (ref 35–45)
PCO2 BLDA: 63 MM HG (ref 35–45)
PCO2 BLDA: 68 MM HG (ref 35–45)
PCO2 BLDA: 70 MM HG (ref 35–45)
PCO2 BLDA: 76 MM HG (ref 35–45)
PCO2 BLDA: 77 MM HG (ref 35–45)
PCO2 BLDA: 80 MM HG (ref 35–45)
PCO2 BLDA: 87 MM HG (ref 35–45)
PCO2 BLDA: 98 MM HG (ref 35–45)
PCO2 BLDV: 58 MM HG (ref 38–50)
PCO2 BLDV: 62 MM HG (ref 38–50)
PCO2 BLDV: 77 MM HG (ref 38–50)
PEEP SETTING VENT: 12 CM H2O
PEEP SETTING VENT: 14 CM H2O
PEEP SETTING VENT: 14 CM H2O
PEEP SETTING VENT: 15 CM H2O
PEEP SETTING VENT: 16 CM H2O
PEEP SETTING VENT: 8 CM H2O
PH BLDA: 6.98 [PH] (ref 7.35–7.45)
PH BLDA: 7.01 [PH] (ref 7.35–7.45)
PH BLDA: 7.04 [PH] (ref 7.35–7.45)
PH BLDA: 7.12 [PH] (ref 7.35–7.45)
PH BLDA: 7.16 [PH] (ref 7.35–7.45)
PH BLDA: 7.17 [PH] (ref 7.35–7.45)
PH BLDA: 7.2 [PH] (ref 7.35–7.45)
PH BLDA: 7.2 [PH] (ref 7.35–7.45)
PH BLDA: 7.22 [PH] (ref 7.35–7.45)
PH BLDA: 7.26 [PH] (ref 7.35–7.45)
PH BLDA: 7.27 [PH] (ref 7.35–7.45)
PH BLDA: 7.27 [PH] (ref 7.35–7.45)
PH BLDA: 7.28 [PH] (ref 7.35–7.45)
PH BLDA: 7.32 [PH] (ref 7.35–7.45)
PH BLDA: 7.37 [PH] (ref 7.35–7.45)
PH BLDA: 7.39 [PH] (ref 7.35–7.45)
PH BLDV: 7.21 [PH] (ref 7.32–7.43)
PH BLDV: 7.32 [PH] (ref 7.32–7.43)
PH BLDV: 7.34 [PH] (ref 7.32–7.43)
PH UR: 5 [PH] (ref 5–8)
PH UR: 6 [PH] (ref 5–8)
PHOSPHATE SERPL-MCNC: 1.4 MG/DL (ref 2.5–4.9)
PHOSPHATE SERPL-MCNC: 1.5 MG/DL (ref 2.5–4.9)
PHOSPHATE SERPL-MCNC: 1.7 MG/DL (ref 2.5–4.9)
PHOSPHATE SERPL-MCNC: 1.8 MG/DL (ref 2.5–4.9)
PHOSPHATE SERPL-MCNC: 1.9 MG/DL (ref 2.5–4.9)
PHOSPHATE SERPL-MCNC: 1.9 MG/DL (ref 2.5–4.9)
PHOSPHATE SERPL-MCNC: 17.3 MG/DL (ref 2.5–4.9)
PHOSPHATE SERPL-MCNC: 2 MG/DL (ref 2.5–4.9)
PHOSPHATE SERPL-MCNC: 2.1 MG/DL (ref 2.5–4.9)
PHOSPHATE SERPL-MCNC: 2.2 MG/DL (ref 2.5–4.9)
PHOSPHATE SERPL-MCNC: 2.3 MG/DL (ref 2.5–4.9)
PHOSPHATE SERPL-MCNC: 2.4 MG/DL (ref 2.5–4.9)
PHOSPHATE SERPL-MCNC: 2.5 MG/DL (ref 2.5–4.9)
PHOSPHATE SERPL-MCNC: 2.6 MG/DL (ref 2.5–4.9)
PHOSPHATE SERPL-MCNC: 2.7 MG/DL (ref 2.5–4.9)
PHOSPHATE SERPL-MCNC: 2.8 MG/DL (ref 2.5–4.9)
PHOSPHATE SERPL-MCNC: 2.9 MG/DL (ref 2.5–4.9)
PHOSPHATE SERPL-MCNC: 3 MG/DL (ref 2.5–4.9)
PHOSPHATE SERPL-MCNC: 3.1 MG/DL (ref 2.5–4.9)
PHOSPHATE SERPL-MCNC: 3.3 MG/DL (ref 2.5–4.9)
PHOSPHATE SERPL-MCNC: 3.4 MG/DL (ref 2.5–4.9)
PHOSPHATE SERPL-MCNC: 3.5 MG/DL (ref 2.5–4.9)
PHOSPHATE SERPL-MCNC: 3.6 MG/DL (ref 2.5–4.9)
PHOSPHATE SERPL-MCNC: 3.7 MG/DL (ref 2.5–4.9)
PHOSPHATE SERPL-MCNC: 3.8 MG/DL (ref 2.5–4.9)
PHOSPHATE SERPL-MCNC: 4.2 MG/DL (ref 2.5–4.9)
PHOSPHATE SERPL-MCNC: 4.4 MG/DL (ref 2.5–4.9)
PHOSPHATE SERPL-MCNC: 5 MG/DL (ref 2.5–4.9)
PHOSPHATE SERPL-MCNC: 5.1 MG/DL (ref 2.5–4.9)
PHOSPHATE SERPL-MCNC: 5.4 MG/DL (ref 2.5–4.9)
PHOSPHATE SERPL-MCNC: 5.8 MG/DL (ref 2.5–4.9)
PHOSPHATE SERPL-MCNC: 5.8 MG/DL (ref 2.5–4.9)
PHOSPHATE SERPL-MCNC: 6.2 MG/DL (ref 2.5–4.9)
PHOSPHATE SERPL-MCNC: 6.7 MG/DL (ref 2.5–4.9)
PHOSPHATE SERPL-MCNC: 7.4 MG/DL (ref 2.5–4.9)
PHOSPHATE SERPL-MCNC: 8.3 MG/DL (ref 2.5–4.9)
PHOSPHATE SERPL-MCNC: 8.4 MG/DL (ref 2.5–4.9)
PLATELET # BLD AUTO: 101 10(3)UL (ref 150–450)
PLATELET # BLD AUTO: 112 10(3)UL (ref 150–450)
PLATELET # BLD AUTO: 121 10(3)UL (ref 150–450)
PLATELET # BLD AUTO: 126 10(3)UL (ref 150–450)
PLATELET # BLD AUTO: 137 10(3)UL (ref 150–450)
PLATELET # BLD AUTO: 140 10(3)UL (ref 150–450)
PLATELET # BLD AUTO: 145 10(3)UL (ref 150–450)
PLATELET # BLD AUTO: 151 10(3)UL (ref 150–450)
PLATELET # BLD AUTO: 157 10(3)UL (ref 150–450)
PLATELET # BLD AUTO: 160 10(3)UL (ref 150–450)
PLATELET # BLD AUTO: 160 10(3)UL (ref 150–450)
PLATELET # BLD AUTO: 177 10(3)UL (ref 150–450)
PLATELET # BLD AUTO: 187 10(3)UL (ref 150–450)
PLATELET # BLD AUTO: 195 10(3)UL (ref 150–450)
PLATELET # BLD AUTO: 196 10(3)UL (ref 150–450)
PLATELET # BLD AUTO: 210 10(3)UL (ref 150–450)
PLATELET # BLD AUTO: 210 10(3)UL (ref 150–450)
PLATELET # BLD AUTO: 216 10(3)UL (ref 150–450)
PLATELET # BLD AUTO: 220 10(3)UL (ref 150–450)
PLATELET # BLD AUTO: 221 10(3)UL (ref 150–450)
PLATELET # BLD AUTO: 224 10(3)UL (ref 150–450)
PLATELET # BLD AUTO: 229 10(3)UL (ref 150–450)
PLATELET # BLD AUTO: 246 10(3)UL (ref 150–450)
PLATELET # BLD AUTO: 246 10(3)UL (ref 150–450)
PLATELET # BLD AUTO: 251 10(3)UL (ref 150–450)
PLATELET # BLD AUTO: 254 10(3)UL (ref 150–450)
PLATELET # BLD AUTO: 26 10(3)UL (ref 150–450)
PLATELET # BLD AUTO: 271 10(3)UL (ref 150–450)
PLATELET # BLD AUTO: 278 10(3)UL (ref 150–450)
PLATELET # BLD AUTO: 36 10(3)UL (ref 150–450)
PLATELET # BLD AUTO: 49 10(3)UL (ref 150–450)
PLATELET # BLD AUTO: 49 10(3)UL (ref 150–450)
PLATELET # BLD AUTO: 50 10(3)UL (ref 150–450)
PLATELET # BLD AUTO: 53 10(3)UL (ref 150–450)
PLATELET # BLD AUTO: 56 10(3)UL (ref 150–450)
PLATELET # BLD AUTO: 56 10(3)UL (ref 150–450)
PLATELET # BLD AUTO: 60 10(3)UL (ref 150–450)
PLATELET # BLD AUTO: 61 10(3)UL (ref 150–450)
PLATELET # BLD AUTO: 66 10(3)UL (ref 150–450)
PLATELET # BLD AUTO: 67 10(3)UL (ref 150–450)
PLATELET # BLD AUTO: 69 10(3)UL (ref 150–450)
PLATELET # BLD AUTO: 73 10(3)UL (ref 150–450)
PLATELET # BLD AUTO: 75 10(3)UL (ref 150–450)
PLATELET # BLD AUTO: 78 10(3)UL (ref 150–450)
PLATELET # BLD AUTO: 85 10(3)UL (ref 150–450)
PLATELET # BLD AUTO: 88 10(3)UL (ref 150–450)
PLATELET # BLD AUTO: 88 10(3)UL (ref 150–450)
PLATELET # BLD AUTO: 92 10(3)UL (ref 150–450)
PLATELET # BLD AUTO: 92 10(3)UL (ref 150–450)
PLATELET MORPHOLOGY: NORMAL
PO2 BLDA: 104 MM HG (ref 80–100)
PO2 BLDA: 114 MM HG (ref 80–100)
PO2 BLDA: 125 MM HG (ref 80–100)
PO2 BLDA: 41 MM HG (ref 80–100)
PO2 BLDA: 43 MM HG (ref 80–100)
PO2 BLDA: 46 MM HG (ref 80–100)
PO2 BLDA: 47 MM HG (ref 80–100)
PO2 BLDA: 50 MM HG (ref 80–100)
PO2 BLDA: 50 MM HG (ref 80–100)
PO2 BLDA: 51 MM HG (ref 80–100)
PO2 BLDA: 53 MM HG (ref 80–100)
PO2 BLDA: 56 MM HG (ref 80–100)
PO2 BLDA: 60 MM HG (ref 80–100)
PO2 BLDA: 61 MM HG (ref 80–100)
PO2 BLDA: 65 MM HG (ref 80–100)
PO2 BLDA: 77 MM HG (ref 80–100)
PO2 BLDV: 30 MM HG (ref 35–40)
PO2 BLDV: 31 MM HG (ref 35–40)
PO2 BLDV: 34 MM HG (ref 35–40)
POTASSIUM BLD-SCNC: 4.6 MMOL/L (ref 3.6–5.1)
POTASSIUM BLD-SCNC: 4.9 MMOL/L (ref 3.3–5.1)
POTASSIUM SERPL-SCNC: 3.4 MMOL/L (ref 3.5–5.1)
POTASSIUM SERPL-SCNC: 3.5 MMOL/L (ref 3.5–5.1)
POTASSIUM SERPL-SCNC: 3.8 MMOL/L (ref 3.5–5.1)
POTASSIUM SERPL-SCNC: 3.9 MMOL/L (ref 3.5–5.1)
POTASSIUM SERPL-SCNC: 4 MMOL/L (ref 3.5–5.1)
POTASSIUM SERPL-SCNC: 4.1 MMOL/L (ref 3.5–5.1)
POTASSIUM SERPL-SCNC: 4.2 MMOL/L (ref 3.5–5.1)
POTASSIUM SERPL-SCNC: 4.3 MMOL/L (ref 3.5–5.1)
POTASSIUM SERPL-SCNC: 4.4 MMOL/L (ref 3.5–5.1)
POTASSIUM SERPL-SCNC: 4.5 MMOL/L (ref 3.5–5.1)
POTASSIUM SERPL-SCNC: 4.6 MMOL/L (ref 3.5–5.1)
POTASSIUM SERPL-SCNC: 4.7 MMOL/L (ref 3.5–5.1)
POTASSIUM SERPL-SCNC: 4.8 MMOL/L (ref 3.5–5.1)
POTASSIUM SERPL-SCNC: 4.9 MMOL/L (ref 3.5–5.1)
POTASSIUM SERPL-SCNC: 5.2 MMOL/L (ref 3.5–5.1)
POTASSIUM SERPL-SCNC: 5.3 MMOL/L (ref 3.5–5.1)
POTASSIUM SERPL-SCNC: 5.5 MMOL/L (ref 3.5–5.1)
POTASSIUM SERPL-SCNC: 5.5 MMOL/L (ref 3.5–5.1)
POTASSIUM SERPL-SCNC: 5.6 MMOL/L (ref 3.5–5.1)
POTASSIUM SERPL-SCNC: 5.7 MMOL/L (ref 3.5–5.1)
POTASSIUM SERPL-SCNC: 6.4 MMOL/L (ref 3.5–5.1)
PROCALCITONIN SERPL-MCNC: 0.11 NG/ML (ref ?–0.16)
PROCALCITONIN SERPL-MCNC: 0.13 NG/ML (ref ?–0.16)
PROMYELOCYTES # BLD: 0.43 X10(3) UL
PROMYELOCYTES # BLD: 0.59 X10(3) UL
PROMYELOCYTES NFR BLD: 1 %
PROMYELOCYTES NFR BLD: 1 %
PROT SERPL-MCNC: 3.6 G/DL (ref 6.4–8.2)
PROT SERPL-MCNC: 4.3 G/DL (ref 6.4–8.2)
PROT SERPL-MCNC: 4.6 G/DL (ref 6.4–8.2)
PROT SERPL-MCNC: 4.6 G/DL (ref 6.4–8.2)
PROT SERPL-MCNC: 4.8 G/DL (ref 6.4–8.2)
PROT SERPL-MCNC: 4.8 G/DL (ref 6.4–8.2)
PROT SERPL-MCNC: 4.9 G/DL (ref 6.4–8.2)
PROT SERPL-MCNC: 4.9 G/DL (ref 6.4–8.2)
PROT SERPL-MCNC: 5 G/DL (ref 6.4–8.2)
PROT SERPL-MCNC: 5.1 G/DL (ref 6.4–8.2)
PROT SERPL-MCNC: 5.2 G/DL (ref 6.4–8.2)
PROT SERPL-MCNC: 5.3 G/DL (ref 6.4–8.2)
PROT SERPL-MCNC: 5.4 G/DL (ref 6.4–8.2)
PROT SERPL-MCNC: 5.7 G/DL (ref 6.4–8.2)
PROT SERPL-MCNC: 5.7 G/DL (ref 6.4–8.2)
PROT SERPL-MCNC: 5.9 G/DL (ref 6.4–8.2)
PROT UR-MCNC: 100 MG/DL
PROT UR-MCNC: 62.8 MG/DL
PROT UR-MCNC: >=500 MG/DL
PROT/CREAT UR-RTO: 1.5
PUNCTURE CHARGE: NO
PUNCTURE CHARGE: YES
RBC # BLD AUTO: 1.84 X10(6)UL
RBC # BLD AUTO: 1.9 X10(6)UL
RBC # BLD AUTO: 1.94 X10(6)UL
RBC # BLD AUTO: 1.97 X10(6)UL
RBC # BLD AUTO: 2.04 X10(6)UL
RBC # BLD AUTO: 2.05 X10(6)UL
RBC # BLD AUTO: 2.06 X10(6)UL
RBC # BLD AUTO: 2.09 X10(6)UL
RBC # BLD AUTO: 2.09 X10(6)UL
RBC # BLD AUTO: 2.11 X10(6)UL
RBC # BLD AUTO: 2.18 X10(6)UL
RBC # BLD AUTO: 2.2 X10(6)UL
RBC # BLD AUTO: 2.21 X10(6)UL
RBC # BLD AUTO: 2.23 X10(6)UL
RBC # BLD AUTO: 2.24 X10(6)UL
RBC # BLD AUTO: 2.25 X10(6)UL
RBC # BLD AUTO: 2.26 X10(6)UL
RBC # BLD AUTO: 2.28 X10(6)UL
RBC # BLD AUTO: 2.28 X10(6)UL
RBC # BLD AUTO: 2.3 X10(6)UL
RBC # BLD AUTO: 2.3 X10(6)UL
RBC # BLD AUTO: 2.32 X10(6)UL
RBC # BLD AUTO: 2.33 X10(6)UL
RBC # BLD AUTO: 2.35 X10(6)UL
RBC # BLD AUTO: 2.41 X10(6)UL
RBC # BLD AUTO: 2.45 X10(6)UL
RBC # BLD AUTO: 2.45 X10(6)UL
RBC # BLD AUTO: 2.53 X10(6)UL
RBC # BLD AUTO: 2.65 X10(6)UL
RBC # BLD AUTO: 2.96 X10(6)UL
RBC # BLD AUTO: 3.04 X10(6)UL
RBC # BLD AUTO: 3.47 X10(6)UL
RBC # BLD AUTO: 4.08 X10(6)UL
RBC # BLD AUTO: 4.18 X10(6)UL
RBC # BLD AUTO: 4.21 X10(6)UL
RBC # BLD AUTO: 4.22 X10(6)UL
RBC # BLD AUTO: 4.34 X10(6)UL
RBC # BLD AUTO: 4.42 X10(6)UL
RBC # BLD AUTO: 4.48 X10(6)UL
RBC # BLD AUTO: 4.56 X10(6)UL
RBC # BLD AUTO: 4.68 X10(6)UL
RBC # BLD AUTO: 4.75 X10(6)UL
RBC # BLD AUTO: 4.77 X10(6)UL
RBC # BLD AUTO: 4.82 X10(6)UL
RBC # BLD AUTO: 4.84 X10(6)UL
RBC # BLD AUTO: 4.95 X10(6)UL
RBC # BLD AUTO: 4.98 X10(6)UL
RBC # BLD AUTO: 5.08 X10(6)UL
RBC # BLD AUTO: 5.1 X10(6)UL
RBC #/AREA URNS AUTO: >10 /HPF
RESP RATE: 12 BPM
RESP RATE: 12 BPM
RESP RATE: 20 BPM
RESP RATE: 28 BPM
RESP RATE: 30 BPM
RESP RATE: 32 BPM
RESP RATE: 34 BPM
RESP RATE: 35 BPM
RH BLOOD TYPE: NEGATIVE
SAO2 % BLDA: 80.3 % (ref 94–100)
SAO2 % BLDA: 81.8 % (ref 94–100)
SAO2 % BLDA: 84.9 % (ref 94–100)
SAO2 % BLDA: 86.1 % (ref 94–100)
SAO2 % BLDA: 87.8 % (ref 94–100)
SAO2 % BLDA: 89.8 % (ref 94–100)
SAO2 % BLDA: 90.6 % (ref 94–100)
SAO2 % BLDA: 92.3 % (ref 94–100)
SAO2 % BLDA: 92.7 % (ref 94–100)
SAO2 % BLDA: 93.4 % (ref 94–100)
SAO2 % BLDA: 94.2 % (ref 94–100)
SAO2 % BLDA: 94.9 % (ref 94–100)
SAO2 % BLDA: 97.3 % (ref 94–100)
SAO2 % BLDA: 98.4 % (ref 94–100)
SAO2 % BLDA: >99 % (ref 94–100)
SAO2 % BLDA: >99 % (ref 94–100)
SAO2 % BLDV: 47 % (ref 60–85)
SAO2 % BLDV: 73 % (ref 60–85)
SAO2 % BLDV: 74.9 % (ref 60–85)
SARS-COV-2 IGG+IGM SERPL QL IA: REACTIVE
SARS-COV-2 RNA RESP QL NAA+PROBE: DETECTED
SODIUM BLD-SCNC: 133 MMOL/L (ref 135–145)
SODIUM BLD-SCNC: 135 MMOL/L (ref 135–145)
SODIUM SERPL-SCNC: 124 MMOL/L (ref 136–145)
SODIUM SERPL-SCNC: 126 MMOL/L (ref 136–145)
SODIUM SERPL-SCNC: 127 MMOL/L (ref 136–145)
SODIUM SERPL-SCNC: 128 MMOL/L (ref 136–145)
SODIUM SERPL-SCNC: 128 MMOL/L (ref 136–145)
SODIUM SERPL-SCNC: 130 MMOL/L (ref 136–145)
SODIUM SERPL-SCNC: 131 MMOL/L (ref 136–145)
SODIUM SERPL-SCNC: 132 MMOL/L (ref 136–145)
SODIUM SERPL-SCNC: 133 MMOL/L (ref 136–145)
SODIUM SERPL-SCNC: 135 MMOL/L (ref 136–145)
SODIUM SERPL-SCNC: 136 MMOL/L (ref 136–145)
SODIUM SERPL-SCNC: 137 MMOL/L (ref 136–145)
SODIUM SERPL-SCNC: 138 MMOL/L (ref 136–145)
SODIUM SERPL-SCNC: 139 MMOL/L (ref 136–145)
SODIUM SERPL-SCNC: 140 MMOL/L (ref 136–145)
SODIUM SERPL-SCNC: 141 MMOL/L (ref 136–145)
SODIUM SERPL-SCNC: 142 MMOL/L (ref 136–145)
SODIUM SERPL-SCNC: 143 MMOL/L (ref 136–145)
SODIUM SERPL-SCNC: 25 MMOL/L
SODIUM SERPL-SCNC: <5 MMOL/L
SP GR UR STRIP: 1.01 (ref 1–1.03)
SP GR UR STRIP: 1.02 (ref 1–1.03)
SPECIMEN VOL 24H UR: 450 ML
SPECIMEN VOL 24H UR: 500 ML
SRA, UNFRACTIONATED HEPARIN, HIGH DOSE: 0 %
SRA, UNFRACTIONATED HEPARIN, LOW DOSE: 0 %
SRA, UNFRACTIONATED HEPARIN: NEGATIVE
TACROLIMUS STAT: 3.6 MCG/L
TACROLIMUS STAT: 4.2 MCG/L
TACROLIMUS STAT: 4.9 MCG/L
TACROLIMUS STAT: 5.1 MCG/L
TACROLIMUS: 11.2 NG/ML
TACROLIMUS: 12.6 NG/ML
TACROLIMUS: 13.6 NG/ML
TACROLIMUS: 16 NG/ML
TACROLIMUS: 19.4 NG/ML
TACROLIMUS: 2 NG/ML
TACROLIMUS: 2.1 NG/ML
TACROLIMUS: 2.5 NG/ML
TACROLIMUS: 2.7 NG/ML
TACROLIMUS: 2.9 NG/ML
TACROLIMUS: 3.5 NG/ML
TACROLIMUS: 3.5 NG/ML
TACROLIMUS: 4.5 NG/ML
TACROLIMUS: 4.5 NG/ML
TACROLIMUS: 5.6 NG/ML
TACROLIMUS: 6.5 NG/ML
TACROLIMUS: 7.5 NG/ML
TACROLIMUS: 8.7 NG/ML
TACROLIMUS: <2 NG/ML
TOTAL CELLS COUNTED BLD: 100
TRIGL SERPL-MCNC: 149 MG/DL (ref 30–149)
TRIGL SERPL-MCNC: 236 MG/DL (ref 30–149)
TRIGL SERPL-MCNC: 243 MG/DL (ref 30–149)
TRIGL SERPL-MCNC: 247 MG/DL (ref 30–149)
TRIGL SERPL-MCNC: 255 MG/DL (ref 30–149)
TRIGL SERPL-MCNC: 270 MG/DL (ref 30–149)
TROPONIN I HIGH SENSITIVITY: 104 NG/L
TROPONIN I HIGH SENSITIVITY: 124 NG/L
UROBILINOGEN UR STRIP-ACNC: <2
UROBILINOGEN UR STRIP-ACNC: <2
UUN UR-MCNC: 317 MG/DL
UUN UR-MCNC: 668 MG/DL
VANCOMYCIN TROUGH SERPL-MCNC: 13.1 UG/ML (ref 10–20)
VANCOMYCIN TROUGH SERPL-MCNC: 13.4 UG/ML (ref 10–20)
VANCOMYCIN TROUGH SERPL-MCNC: 13.7 UG/ML (ref 10–20)
VANCOMYCIN TROUGH SERPL-MCNC: 18.5 UG/ML (ref 10–20)
VANCOMYCIN TROUGH SERPL-MCNC: 22.3 UG/ML (ref 10–20)
VARIANT LYMPHS NFR BLD MANUAL: 1 %
WBC # BLD AUTO: 14.4 X10(3) UL (ref 4–11)
WBC # BLD AUTO: 15.4 X10(3) UL (ref 4–11)
WBC # BLD AUTO: 16.2 X10(3) UL (ref 4–11)
WBC # BLD AUTO: 16.2 X10(3) UL (ref 4–11)
WBC # BLD AUTO: 17.2 X10(3) UL (ref 4–11)
WBC # BLD AUTO: 17.2 X10(3) UL (ref 4–11)
WBC # BLD AUTO: 17.5 X10(3) UL (ref 4–11)
WBC # BLD AUTO: 17.7 X10(3) UL (ref 4–11)
WBC # BLD AUTO: 18.5 X10(3) UL (ref 4–11)
WBC # BLD AUTO: 19.1 X10(3) UL (ref 4–11)
WBC # BLD AUTO: 19.2 X10(3) UL (ref 4–11)
WBC # BLD AUTO: 19.3 X10(3) UL (ref 4–11)
WBC # BLD AUTO: 2.3 X10(3) UL (ref 4–11)
WBC # BLD AUTO: 20 X10(3) UL (ref 4–11)
WBC # BLD AUTO: 20 X10(3) UL (ref 4–11)
WBC # BLD AUTO: 20.3 X10(3) UL (ref 4–11)
WBC # BLD AUTO: 20.6 X10(3) UL (ref 4–11)
WBC # BLD AUTO: 20.6 X10(3) UL (ref 4–11)
WBC # BLD AUTO: 20.7 X10(3) UL (ref 4–11)
WBC # BLD AUTO: 21.2 X10(3) UL (ref 4–11)
WBC # BLD AUTO: 21.6 X10(3) UL (ref 4–11)
WBC # BLD AUTO: 21.9 X10(3) UL (ref 4–11)
WBC # BLD AUTO: 23.3 X10(3) UL (ref 4–11)
WBC # BLD AUTO: 23.5 X10(3) UL (ref 4–11)
WBC # BLD AUTO: 24.3 X10(3) UL (ref 4–11)
WBC # BLD AUTO: 25.1 X10(3) UL (ref 4–11)
WBC # BLD AUTO: 25.2 X10(3) UL (ref 4–11)
WBC # BLD AUTO: 25.7 X10(3) UL (ref 4–11)
WBC # BLD AUTO: 26.6 X10(3) UL (ref 4–11)
WBC # BLD AUTO: 26.9 X10(3) UL (ref 4–11)
WBC # BLD AUTO: 27.9 X10(3) UL (ref 4–11)
WBC # BLD AUTO: 28.3 X10(3) UL (ref 4–11)
WBC # BLD AUTO: 28.3 X10(3) UL (ref 4–11)
WBC # BLD AUTO: 29.5 X10(3) UL (ref 4–11)
WBC # BLD AUTO: 29.5 X10(3) UL (ref 4–11)
WBC # BLD AUTO: 30.4 X10(3) UL (ref 4–11)
WBC # BLD AUTO: 32.8 X10(3) UL (ref 4–11)
WBC # BLD AUTO: 34.3 X10(3) UL (ref 4–11)
WBC # BLD AUTO: 36.6 X10(3) UL (ref 4–11)
WBC # BLD AUTO: 37.4 X10(3) UL (ref 4–11)
WBC # BLD AUTO: 4.8 X10(3) UL (ref 4–11)
WBC # BLD AUTO: 41.5 X10(3) UL (ref 4–11)
WBC # BLD AUTO: 42.5 X10(3) UL (ref 4–11)
WBC # BLD AUTO: 43.1 X10(3) UL (ref 4–11)
WBC # BLD AUTO: 43.1 X10(3) UL (ref 4–11)
WBC # BLD AUTO: 44.1 X10(3) UL (ref 4–11)
WBC # BLD AUTO: 50.7 X10(3) UL (ref 4–11)
WBC # BLD AUTO: 58.8 X10(3) UL (ref 4–11)
WBC # BLD AUTO: 6.2 X10(3) UL (ref 4–11)
WBC # BLD AUTO: 7.7 X10(3) UL (ref 4–11)
YEAST UR QL: PRESENT /HPF

## 2022-01-01 PROCEDURE — 83735 ASSAY OF MAGNESIUM: CPT | Performed by: INTERNAL MEDICINE

## 2022-01-01 PROCEDURE — 85025 COMPLETE CBC W/AUTO DIFF WBC: CPT | Performed by: EMERGENCY MEDICINE

## 2022-01-01 PROCEDURE — 80069 RENAL FUNCTION PANEL: CPT | Performed by: INTERNAL MEDICINE

## 2022-01-01 PROCEDURE — 82962 GLUCOSE BLOOD TEST: CPT

## 2022-01-01 PROCEDURE — 85730 THROMBOPLASTIN TIME PARTIAL: CPT | Performed by: INTERNAL MEDICINE

## 2022-01-01 PROCEDURE — 85007 BL SMEAR W/DIFF WBC COUNT: CPT | Performed by: INTERNAL MEDICINE

## 2022-01-01 PROCEDURE — 84100 ASSAY OF PHOSPHORUS: CPT | Performed by: INTERNAL MEDICINE

## 2022-01-01 PROCEDURE — 84156 ASSAY OF PROTEIN URINE: CPT | Performed by: INTERNAL MEDICINE

## 2022-01-01 PROCEDURE — 83605 ASSAY OF LACTIC ACID: CPT | Performed by: INTERNAL MEDICINE

## 2022-01-01 PROCEDURE — 90947 DIALYSIS REPEATED EVAL: CPT

## 2022-01-01 PROCEDURE — 90945 DIALYSIS ONE EVALUATION: CPT

## 2022-01-01 PROCEDURE — 93970 EXTREMITY STUDY: CPT | Performed by: INTERNAL MEDICINE

## 2022-01-01 PROCEDURE — 82728 ASSAY OF FERRITIN: CPT | Performed by: INTERNAL MEDICINE

## 2022-01-01 PROCEDURE — 85025 COMPLETE CBC W/AUTO DIFF WBC: CPT | Performed by: INTERNAL MEDICINE

## 2022-01-01 PROCEDURE — 86850 RBC ANTIBODY SCREEN: CPT | Performed by: INTERNAL MEDICINE

## 2022-01-01 PROCEDURE — 36600 WITHDRAWAL OF ARTERIAL BLOOD: CPT | Performed by: STUDENT IN AN ORGANIZED HEALTH CARE EDUCATION/TRAINING PROGRAM

## 2022-01-01 PROCEDURE — 84145 PROCALCITONIN (PCT): CPT | Performed by: EMERGENCY MEDICINE

## 2022-01-01 PROCEDURE — 87529 HSV DNA AMP PROBE: CPT | Performed by: INTERNAL MEDICINE

## 2022-01-01 PROCEDURE — 84478 ASSAY OF TRIGLYCERIDES: CPT | Performed by: INTERNAL MEDICINE

## 2022-01-01 PROCEDURE — P9047 ALBUMIN (HUMAN), 25%, 50ML: HCPCS | Performed by: INTERNAL MEDICINE

## 2022-01-01 PROCEDURE — 85379 FIBRIN DEGRADATION QUANT: CPT | Performed by: EMERGENCY MEDICINE

## 2022-01-01 PROCEDURE — 86900 BLOOD TYPING SEROLOGIC ABO: CPT | Performed by: HOSPITALIST

## 2022-01-01 PROCEDURE — 94003 VENT MGMT INPAT SUBQ DAY: CPT

## 2022-01-01 PROCEDURE — 85379 FIBRIN DEGRADATION QUANT: CPT | Performed by: HOSPITALIST

## 2022-01-01 PROCEDURE — 86901 BLOOD TYPING SEROLOGIC RH(D): CPT | Performed by: HOSPITALIST

## 2022-01-01 PROCEDURE — 80053 COMPREHEN METABOLIC PANEL: CPT | Performed by: HOSPITALIST

## 2022-01-01 PROCEDURE — 85027 COMPLETE CBC AUTOMATED: CPT | Performed by: INTERNAL MEDICINE

## 2022-01-01 PROCEDURE — 80202 ASSAY OF VANCOMYCIN: CPT | Performed by: INTERNAL MEDICINE

## 2022-01-01 PROCEDURE — 80197 ASSAY OF TACROLIMUS: CPT | Performed by: INTERNAL MEDICINE

## 2022-01-01 PROCEDURE — 36592 COLLECT BLOOD FROM PICC: CPT

## 2022-01-01 PROCEDURE — 96365 THER/PROPH/DIAG IV INF INIT: CPT

## 2022-01-01 PROCEDURE — 3E0333Z INTRODUCTION OF ANTI-INFLAMMATORY INTO PERIPHERAL VEIN, PERCUTANEOUS APPROACH: ICD-10-PCS | Performed by: EMERGENCY MEDICINE

## 2022-01-01 PROCEDURE — 85007 BL SMEAR W/DIFF WBC COUNT: CPT | Performed by: HOSPITALIST

## 2022-01-01 PROCEDURE — 82728 ASSAY OF FERRITIN: CPT | Performed by: HOSPITALIST

## 2022-01-01 PROCEDURE — 94640 AIRWAY INHALATION TREATMENT: CPT

## 2022-01-01 PROCEDURE — 87496 CYTOMEG DNA AMP PROBE: CPT | Performed by: INTERNAL MEDICINE

## 2022-01-01 PROCEDURE — 84132 ASSAY OF SERUM POTASSIUM: CPT | Performed by: INTERNAL MEDICINE

## 2022-01-01 PROCEDURE — 36600 WITHDRAWAL OF ARTERIAL BLOOD: CPT | Performed by: INTERNAL MEDICINE

## 2022-01-01 PROCEDURE — 86140 C-REACTIVE PROTEIN: CPT | Performed by: INTERNAL MEDICINE

## 2022-01-01 PROCEDURE — 84145 PROCALCITONIN (PCT): CPT | Performed by: STUDENT IN AN ORGANIZED HEALTH CARE EDUCATION/TRAINING PROGRAM

## 2022-01-01 PROCEDURE — 71045 X-RAY EXAM CHEST 1 VIEW: CPT | Performed by: INTERNAL MEDICINE

## 2022-01-01 PROCEDURE — 86665 EPSTEIN-BARR CAPSID VCA: CPT | Performed by: INTERNAL MEDICINE

## 2022-01-01 PROCEDURE — 83050 HGB METHEMOGLOBIN QUAN: CPT | Performed by: STUDENT IN AN ORGANIZED HEALTH CARE EDUCATION/TRAINING PROGRAM

## 2022-01-01 PROCEDURE — 83735 ASSAY OF MAGNESIUM: CPT | Performed by: HOSPITALIST

## 2022-01-01 PROCEDURE — 86901 BLOOD TYPING SEROLOGIC RH(D): CPT | Performed by: INTERNAL MEDICINE

## 2022-01-01 PROCEDURE — 76776 US EXAM K TRANSPL W/DOPPLER: CPT | Performed by: INTERNAL MEDICINE

## 2022-01-01 PROCEDURE — 85730 THROMBOPLASTIN TIME PARTIAL: CPT | Performed by: HOSPITALIST

## 2022-01-01 PROCEDURE — 71045 X-RAY EXAM CHEST 1 VIEW: CPT | Performed by: EMERGENCY MEDICINE

## 2022-01-01 PROCEDURE — 82728 ASSAY OF FERRITIN: CPT | Performed by: EMERGENCY MEDICINE

## 2022-01-01 PROCEDURE — 84540 ASSAY OF URINE/UREA-N: CPT | Performed by: INTERNAL MEDICINE

## 2022-01-01 PROCEDURE — 86900 BLOOD TYPING SEROLOGIC ABO: CPT | Performed by: INTERNAL MEDICINE

## 2022-01-01 PROCEDURE — 36591 DRAW BLOOD OFF VENOUS DEVICE: CPT

## 2022-01-01 PROCEDURE — 86922 COMPATIBILITY TEST ANTIGLOB: CPT

## 2022-01-01 PROCEDURE — 85027 COMPLETE CBC AUTOMATED: CPT | Performed by: HOSPITALIST

## 2022-01-01 PROCEDURE — 82570 ASSAY OF URINE CREATININE: CPT | Performed by: INTERNAL MEDICINE

## 2022-01-01 PROCEDURE — 86850 RBC ANTIBODY SCREEN: CPT | Performed by: HOSPITALIST

## 2022-01-01 PROCEDURE — 84295 ASSAY OF SERUM SODIUM: CPT | Performed by: INTERNAL MEDICINE

## 2022-01-01 PROCEDURE — 80053 COMPREHEN METABOLIC PANEL: CPT | Performed by: INTERNAL MEDICINE

## 2022-01-01 PROCEDURE — 86644 CMV ANTIBODY: CPT | Performed by: INTERNAL MEDICINE

## 2022-01-01 PROCEDURE — 85379 FIBRIN DEGRADATION QUANT: CPT | Performed by: INTERNAL MEDICINE

## 2022-01-01 PROCEDURE — 87077 CULTURE AEROBIC IDENTIFY: CPT | Performed by: INTERNAL MEDICINE

## 2022-01-01 PROCEDURE — 87493 C DIFF AMPLIFIED PROBE: CPT | Performed by: INTERNAL MEDICINE

## 2022-01-01 PROCEDURE — 93005 ELECTROCARDIOGRAM TRACING: CPT

## 2022-01-01 PROCEDURE — 84295 ASSAY OF SERUM SODIUM: CPT | Performed by: STUDENT IN AN ORGANIZED HEALTH CARE EDUCATION/TRAINING PROGRAM

## 2022-01-01 PROCEDURE — 76937 US GUIDE VASCULAR ACCESS: CPT

## 2022-01-01 PROCEDURE — 86706 HEP B SURFACE ANTIBODY: CPT | Performed by: INTERNAL MEDICINE

## 2022-01-01 PROCEDURE — 85007 BL SMEAR W/DIFF WBC COUNT: CPT | Performed by: EMERGENCY MEDICINE

## 2022-01-01 PROCEDURE — 82805 BLOOD GASES W/O2 SATURATION: CPT | Performed by: INTERNAL MEDICINE

## 2022-01-01 PROCEDURE — 82805 BLOOD GASES W/O2 SATURATION: CPT | Performed by: STUDENT IN AN ORGANIZED HEALTH CARE EDUCATION/TRAINING PROGRAM

## 2022-01-01 PROCEDURE — 94002 VENT MGMT INPAT INIT DAY: CPT

## 2022-01-01 PROCEDURE — 02HV33Z INSERTION OF INFUSION DEVICE INTO SUPERIOR VENA CAVA, PERCUTANEOUS APPROACH: ICD-10-PCS | Performed by: RADIOLOGY

## 2022-01-01 PROCEDURE — 83050 HGB METHEMOGLOBIN QUAN: CPT | Performed by: INTERNAL MEDICINE

## 2022-01-01 PROCEDURE — 3E0436Z INTRODUCTION OF NUTRITIONAL SUBSTANCE INTO CENTRAL VEIN, PERCUTANEOUS APPROACH: ICD-10-PCS | Performed by: INTERNAL MEDICINE

## 2022-01-01 PROCEDURE — 83880 ASSAY OF NATRIURETIC PEPTIDE: CPT | Performed by: EMERGENCY MEDICINE

## 2022-01-01 PROCEDURE — 87106 FUNGI IDENTIFICATION YEAST: CPT | Performed by: INTERNAL MEDICINE

## 2022-01-01 PROCEDURE — 80503 PATH CLIN CONSLTJ SF 5-20: CPT | Performed by: INTERNAL MEDICINE

## 2022-01-01 PROCEDURE — 87798 DETECT AGENT NOS DNA AMP: CPT | Performed by: INTERNAL MEDICINE

## 2022-01-01 PROCEDURE — 85018 HEMOGLOBIN: CPT | Performed by: INTERNAL MEDICINE

## 2022-01-01 PROCEDURE — 86704 HEP B CORE ANTIBODY TOTAL: CPT | Performed by: INTERNAL MEDICINE

## 2022-01-01 PROCEDURE — 85027 COMPLETE CBC AUTOMATED: CPT | Performed by: EMERGENCY MEDICINE

## 2022-01-01 PROCEDURE — 81001 URINALYSIS AUTO W/SCOPE: CPT | Performed by: INTERNAL MEDICINE

## 2022-01-01 PROCEDURE — 85014 HEMATOCRIT: CPT | Performed by: INTERNAL MEDICINE

## 2022-01-01 PROCEDURE — 86140 C-REACTIVE PROTEIN: CPT | Performed by: HOSPITALIST

## 2022-01-01 PROCEDURE — 87086 URINE CULTURE/COLONY COUNT: CPT | Performed by: INTERNAL MEDICINE

## 2022-01-01 PROCEDURE — 80069 RENAL FUNCTION PANEL: CPT | Performed by: HOSPITALIST

## 2022-01-01 PROCEDURE — 85060 BLOOD SMEAR INTERPRETATION: CPT | Performed by: INTERNAL MEDICINE

## 2022-01-01 PROCEDURE — 87102 FUNGUS ISOLATION CULTURE: CPT | Performed by: INTERNAL MEDICINE

## 2022-01-01 PROCEDURE — 5A09357 ASSISTANCE WITH RESPIRATORY VENTILATION, LESS THAN 24 CONSECUTIVE HOURS, CONTINUOUS POSITIVE AIRWAY PRESSURE: ICD-10-PCS | Performed by: EMERGENCY MEDICINE

## 2022-01-01 PROCEDURE — 83615 LACTATE (LD) (LDH) ENZYME: CPT | Performed by: EMERGENCY MEDICINE

## 2022-01-01 PROCEDURE — 96375 TX/PRO/DX INJ NEW DRUG ADDON: CPT

## 2022-01-01 PROCEDURE — 5A1D90Z PERFORMANCE OF URINARY FILTRATION, CONTINUOUS, GREATER THAN 18 HOURS PER DAY: ICD-10-PCS | Performed by: INTERNAL MEDICINE

## 2022-01-01 PROCEDURE — 84300 ASSAY OF URINE SODIUM: CPT | Performed by: INTERNAL MEDICINE

## 2022-01-01 PROCEDURE — 87040 BLOOD CULTURE FOR BACTERIA: CPT | Performed by: INTERNAL MEDICINE

## 2022-01-01 PROCEDURE — 93306 TTE W/DOPPLER COMPLETE: CPT | Performed by: INTERNAL MEDICINE

## 2022-01-01 PROCEDURE — 85025 COMPLETE CBC W/AUTO DIFF WBC: CPT | Performed by: HOSPITALIST

## 2022-01-01 PROCEDURE — 83615 LACTATE (LD) (LDH) ENZYME: CPT | Performed by: INTERNAL MEDICINE

## 2022-01-01 PROCEDURE — 36556 INSERT NON-TUNNEL CV CATH: CPT

## 2022-01-01 PROCEDURE — 71260 CT THORAX DX C+: CPT | Performed by: EMERGENCY MEDICINE

## 2022-01-01 PROCEDURE — 86920 COMPATIBILITY TEST SPIN: CPT

## 2022-01-01 PROCEDURE — 87070 CULTURE OTHR SPECIMN AEROBIC: CPT | Performed by: INTERNAL MEDICINE

## 2022-01-01 PROCEDURE — 82375 ASSAY CARBOXYHB QUANT: CPT | Performed by: STUDENT IN AN ORGANIZED HEALTH CARE EDUCATION/TRAINING PROGRAM

## 2022-01-01 PROCEDURE — 87186 SC STD MICRODIL/AGAR DIL: CPT | Performed by: INTERNAL MEDICINE

## 2022-01-01 PROCEDURE — 82375 ASSAY CARBOXYHB QUANT: CPT | Performed by: INTERNAL MEDICINE

## 2022-01-01 PROCEDURE — 71045 X-RAY EXAM CHEST 1 VIEW: CPT | Performed by: RADIOLOGY

## 2022-01-01 PROCEDURE — 83735 ASSAY OF MAGNESIUM: CPT | Performed by: EMERGENCY MEDICINE

## 2022-01-01 PROCEDURE — 82550 ASSAY OF CK (CPK): CPT | Performed by: EMERGENCY MEDICINE

## 2022-01-01 PROCEDURE — 87040 BLOOD CULTURE FOR BACTERIA: CPT | Performed by: STUDENT IN AN ORGANIZED HEALTH CARE EDUCATION/TRAINING PROGRAM

## 2022-01-01 PROCEDURE — 80048 BASIC METABOLIC PNL TOTAL CA: CPT | Performed by: INTERNAL MEDICINE

## 2022-01-01 PROCEDURE — 87205 SMEAR GRAM STAIN: CPT | Performed by: INTERNAL MEDICINE

## 2022-01-01 PROCEDURE — 02HV33Z INSERTION OF INFUSION DEVICE INTO SUPERIOR VENA CAVA, PERCUTANEOUS APPROACH: ICD-10-PCS | Performed by: INTERNAL MEDICINE

## 2022-01-01 PROCEDURE — 36430 TRANSFUSION BLD/BLD COMPNT: CPT

## 2022-01-01 PROCEDURE — 86769 SARS-COV-2 COVID-19 ANTIBODY: CPT | Performed by: INTERNAL MEDICINE

## 2022-01-01 PROCEDURE — 93931 UPPER EXTREMITY STUDY: CPT | Performed by: EMERGENCY MEDICINE

## 2022-01-01 PROCEDURE — 84484 ASSAY OF TROPONIN QUANT: CPT | Performed by: INTERNAL MEDICINE

## 2022-01-01 PROCEDURE — 93010 ELECTROCARDIOGRAM REPORT: CPT | Performed by: EMERGENCY MEDICINE

## 2022-01-01 PROCEDURE — 83935 ASSAY OF URINE OSMOLALITY: CPT | Performed by: INTERNAL MEDICINE

## 2022-01-01 PROCEDURE — 36573 INSJ PICC RS&I 5 YR+: CPT

## 2022-01-01 PROCEDURE — 86022 PLATELET ANTIBODIES: CPT | Performed by: STUDENT IN AN ORGANIZED HEALTH CARE EDUCATION/TRAINING PROGRAM

## 2022-01-01 PROCEDURE — 83690 ASSAY OF LIPASE: CPT | Performed by: INTERNAL MEDICINE

## 2022-01-01 PROCEDURE — 82330 ASSAY OF CALCIUM: CPT | Performed by: INTERNAL MEDICINE

## 2022-01-01 PROCEDURE — 86664 EPSTEIN-BARR NUCLEAR ANTIGEN: CPT | Performed by: INTERNAL MEDICINE

## 2022-01-01 PROCEDURE — 86645 CMV ANTIBODY IGM: CPT | Performed by: INTERNAL MEDICINE

## 2022-01-01 PROCEDURE — 80053 COMPREHEN METABOLIC PANEL: CPT | Performed by: EMERGENCY MEDICINE

## 2022-01-01 PROCEDURE — 87206 SMEAR FLUORESCENT/ACID STAI: CPT | Performed by: INTERNAL MEDICINE

## 2022-01-01 PROCEDURE — XW033E5 INTRODUCTION OF REMDESIVIR ANTI-INFECTIVE INTO PERIPHERAL VEIN, PERCUTANEOUS APPROACH, NEW TECHNOLOGY GROUP 5: ICD-10-PCS | Performed by: EMERGENCY MEDICINE

## 2022-01-01 PROCEDURE — 02HV33Z INSERTION OF INFUSION DEVICE INTO SUPERIOR VENA CAVA, PERCUTANEOUS APPROACH: ICD-10-PCS | Performed by: EMERGENCY MEDICINE

## 2022-01-01 PROCEDURE — 5A0935A ASSISTANCE WITH RESPIRATORY VENTILATION, LESS THAN 24 CONSECUTIVE HOURS, HIGH NASAL FLOW/VELOCITY: ICD-10-PCS | Performed by: INTERNAL MEDICINE

## 2022-01-01 PROCEDURE — XW033H5 INTRODUCTION OF TOCILIZUMAB INTO PERIPHERAL VEIN, PERCUTANEOUS APPROACH, NEW TECHNOLOGY GROUP 5: ICD-10-PCS | Performed by: INTERNAL MEDICINE

## 2022-01-01 PROCEDURE — 87340 HEPATITIS B SURFACE AG IA: CPT | Performed by: INTERNAL MEDICINE

## 2022-01-01 PROCEDURE — 85018 HEMOGLOBIN: CPT | Performed by: STUDENT IN AN ORGANIZED HEALTH CARE EDUCATION/TRAINING PROGRAM

## 2022-01-01 PROCEDURE — 86140 C-REACTIVE PROTEIN: CPT | Performed by: EMERGENCY MEDICINE

## 2022-01-01 PROCEDURE — 03HY32Z INSERTION OF MONITORING DEVICE INTO UPPER ARTERY, PERCUTANEOUS APPROACH: ICD-10-PCS | Performed by: STUDENT IN AN ORGANIZED HEALTH CARE EDUCATION/TRAINING PROGRAM

## 2022-01-01 PROCEDURE — 82330 ASSAY OF CALCIUM: CPT | Performed by: STUDENT IN AN ORGANIZED HEALTH CARE EDUCATION/TRAINING PROGRAM

## 2022-01-01 PROCEDURE — 84478 ASSAY OF TRIGLYCERIDES: CPT | Performed by: EMERGENCY MEDICINE

## 2022-01-01 PROCEDURE — 0W9900Z DRAINAGE OF RIGHT PLEURAL CAVITY WITH DRAINAGE DEVICE, OPEN APPROACH: ICD-10-PCS | Performed by: INTERNAL MEDICINE

## 2022-01-01 PROCEDURE — 5A1955Z RESPIRATORY VENTILATION, GREATER THAN 96 CONSECUTIVE HOURS: ICD-10-PCS | Performed by: STUDENT IN AN ORGANIZED HEALTH CARE EDUCATION/TRAINING PROGRAM

## 2022-01-01 PROCEDURE — 86694 HERPES SIMPLEX NES ANTBDY: CPT | Performed by: INTERNAL MEDICINE

## 2022-01-01 PROCEDURE — 84132 ASSAY OF SERUM POTASSIUM: CPT | Performed by: STUDENT IN AN ORGANIZED HEALTH CARE EDUCATION/TRAINING PROGRAM

## 2022-01-01 PROCEDURE — 93010 ELECTROCARDIOGRAM REPORT: CPT | Performed by: INTERNAL MEDICINE

## 2022-01-01 PROCEDURE — 93010 ELECTROCARDIOGRAM REPORT: CPT | Performed by: HOSPITALIST

## 2022-01-01 PROCEDURE — 84100 ASSAY OF PHOSPHORUS: CPT | Performed by: EMERGENCY MEDICINE

## 2022-01-01 PROCEDURE — 99285 EMERGENCY DEPT VISIT HI MDM: CPT

## 2022-01-01 PROCEDURE — 96367 TX/PROPH/DG ADDL SEQ IV INF: CPT

## 2022-01-01 PROCEDURE — 30233N1 TRANSFUSION OF NONAUTOLOGOUS RED BLOOD CELLS INTO PERIPHERAL VEIN, PERCUTANEOUS APPROACH: ICD-10-PCS | Performed by: INTERNAL MEDICINE

## 2022-01-01 PROCEDURE — 0BH17EZ INSERTION OF ENDOTRACHEAL AIRWAY INTO TRACHEA, VIA NATURAL OR ARTIFICIAL OPENING: ICD-10-PCS | Performed by: EMERGENCY MEDICINE

## 2022-01-01 PROCEDURE — 83605 ASSAY OF LACTIC ACID: CPT | Performed by: STUDENT IN AN ORGANIZED HEALTH CARE EDUCATION/TRAINING PROGRAM

## 2022-01-01 PROCEDURE — 30233R1 TRANSFUSION OF NONAUTOLOGOUS PLATELETS INTO PERIPHERAL VEIN, PERCUTANEOUS APPROACH: ICD-10-PCS | Performed by: INTERNAL MEDICINE

## 2022-01-01 RX ORDER — POTASSIUM CHLORIDE 1.5 G/1.77G
20 POWDER, FOR SOLUTION ORAL ONCE
Status: COMPLETED | OUTPATIENT
Start: 2022-01-01 | End: 2022-01-01

## 2022-01-01 RX ORDER — MYCOPHENOLIC ACID 180 MG/1
720 TABLET, DELAYED RELEASE ORAL
COMMUNITY

## 2022-01-01 RX ORDER — DEXMEDETOMIDINE HYDROCHLORIDE 4 UG/ML
INJECTION, SOLUTION INTRAVENOUS
Status: COMPLETED
Start: 2022-01-01 | End: 2022-01-01

## 2022-01-01 RX ORDER — BIOTIN 5 MG
TABLET ORAL
COMMUNITY

## 2022-01-01 RX ORDER — DEXTROSE MONOHYDRATE 25 G/50ML
50 INJECTION, SOLUTION INTRAVENOUS ONCE
Status: COMPLETED | OUTPATIENT
Start: 2022-01-01 | End: 2022-01-01

## 2022-01-01 RX ORDER — LIDOCAINE HYDROCHLORIDE 40 MG/ML
SOLUTION TOPICAL
Status: DISCONTINUED
Start: 2022-01-01 | End: 2022-01-01 | Stop reason: WASHOUT

## 2022-01-01 RX ORDER — TACROLIMUS 1 MG/1
1 CAPSULE ORAL 2 TIMES DAILY
Status: DISCONTINUED | OUTPATIENT
Start: 2022-01-01 | End: 2022-01-01

## 2022-01-01 RX ORDER — MIDAZOLAM HYDROCHLORIDE 1 MG/ML
3 INJECTION INTRAMUSCULAR; INTRAVENOUS ONCE
Status: COMPLETED | OUTPATIENT
Start: 2022-01-01 | End: 2022-01-01

## 2022-01-01 RX ORDER — ETOMIDATE 2 MG/ML
INJECTION INTRAVENOUS
Status: COMPLETED
Start: 2022-01-01 | End: 2022-01-01

## 2022-01-01 RX ORDER — ALBUTEROL SULFATE 90 UG/1
4 AEROSOL, METERED RESPIRATORY (INHALATION) EVERY 4 HOURS PRN
Status: DISCONTINUED | OUTPATIENT
Start: 2022-01-01 | End: 2022-01-01

## 2022-01-01 RX ORDER — BISACODYL 10 MG
10 SUPPOSITORY, RECTAL RECTAL
Status: DISCONTINUED | OUTPATIENT
Start: 2022-01-01 | End: 2022-01-01

## 2022-01-01 RX ORDER — DEXTROSE MONOHYDRATE 25 G/50ML
50 INJECTION, SOLUTION INTRAVENOUS
Status: DISCONTINUED | OUTPATIENT
Start: 2022-01-01 | End: 2022-01-01

## 2022-01-01 RX ORDER — LORAZEPAM 2 MG/ML
0.5 INJECTION INTRAMUSCULAR ONCE
Status: COMPLETED | OUTPATIENT
Start: 2022-01-01 | End: 2022-01-01

## 2022-01-01 RX ORDER — FUROSEMIDE 10 MG/ML
40 INJECTION INTRAMUSCULAR; INTRAVENOUS ONCE
Status: COMPLETED | OUTPATIENT
Start: 2022-01-01 | End: 2022-01-01

## 2022-01-01 RX ORDER — SULFAMETHOXAZOLE AND TRIMETHOPRIM 400; 80 MG/1; MG/1
1 TABLET ORAL
COMMUNITY

## 2022-01-01 RX ORDER — FUROSEMIDE 10 MG/ML
20 INJECTION INTRAMUSCULAR; INTRAVENOUS ONCE
Status: COMPLETED | OUTPATIENT
Start: 2022-01-01 | End: 2022-01-01

## 2022-01-01 RX ORDER — LIDOCAINE HYDROCHLORIDE 20 MG/ML
INJECTION, SOLUTION EPIDURAL; INFILTRATION; INTRACAUDAL; PERINEURAL
Status: COMPLETED
Start: 2022-01-01 | End: 2022-01-01

## 2022-01-01 RX ORDER — TACROLIMUS 1 MG/1
2 CAPSULE ORAL 2 TIMES DAILY
COMMUNITY

## 2022-01-01 RX ORDER — HEPARIN SODIUM 1000 [USP'U]/ML
INJECTION, SOLUTION INTRAVENOUS; SUBCUTANEOUS
Status: COMPLETED
Start: 2022-01-01 | End: 2022-01-01

## 2022-01-01 RX ORDER — FUROSEMIDE 10 MG/ML
INJECTION INTRAMUSCULAR; INTRAVENOUS
Status: COMPLETED
Start: 2022-01-01 | End: 2022-01-01

## 2022-01-01 RX ORDER — DEXMEDETOMIDINE HYDROCHLORIDE 4 UG/ML
INJECTION, SOLUTION INTRAVENOUS CONTINUOUS
Status: DISCONTINUED | OUTPATIENT
Start: 2022-01-01 | End: 2022-01-01 | Stop reason: ALTCHOICE

## 2022-01-01 RX ORDER — MELATONIN
325
COMMUNITY

## 2022-01-01 RX ORDER — GUAIFENESIN 600 MG
600 TABLET, EXTENDED RELEASE 12 HR ORAL 2 TIMES DAILY
Status: DISCONTINUED | OUTPATIENT
Start: 2022-01-01 | End: 2022-01-01

## 2022-01-01 RX ORDER — LORAZEPAM 2 MG/ML
1 INJECTION INTRAMUSCULAR
Status: DISCONTINUED | OUTPATIENT
Start: 2022-01-01 | End: 2022-01-01

## 2022-01-01 RX ORDER — TACROLIMUS 1 MG/1
2 CAPSULE ORAL 2 TIMES DAILY
Status: DISCONTINUED | OUTPATIENT
Start: 2022-01-01 | End: 2022-01-01

## 2022-01-01 RX ORDER — DEXAMETHASONE SODIUM PHOSPHATE 4 MG/ML
6 VIAL (ML) INJECTION EVERY 24 HOURS
Status: DISCONTINUED | OUTPATIENT
Start: 2022-01-01 | End: 2022-01-01

## 2022-01-01 RX ORDER — VANCOMYCIN HYDROCHLORIDE
1250 EVERY 24 HOURS
Status: DISCONTINUED | OUTPATIENT
Start: 2022-01-01 | End: 2022-01-01

## 2022-01-01 RX ORDER — DEXAMETHASONE 6 MG/1
6 TABLET ORAL NIGHTLY
Status: COMPLETED | OUTPATIENT
Start: 2022-01-01 | End: 2022-01-01

## 2022-01-01 RX ORDER — FEXOFENADINE HYDROCHLORIDE 60 MG/1
60 TABLET, FILM COATED ORAL DAILY
COMMUNITY

## 2022-01-01 RX ORDER — HEPARIN SODIUM AND DEXTROSE 10000; 5 [USP'U]/100ML; G/100ML
INJECTION INTRAVENOUS
Status: COMPLETED
Start: 2022-01-01 | End: 2022-01-01

## 2022-01-01 RX ORDER — VANCOMYCIN 2 GRAM/500 ML IN 0.9 % SODIUM CHLORIDE INTRAVENOUS
25 ONCE
Status: COMPLETED | OUTPATIENT
Start: 2022-01-01 | End: 2022-01-01

## 2022-01-01 RX ORDER — MELATONIN
1000 DAILY
COMMUNITY

## 2022-01-01 RX ORDER — CALCIUM CARBONATE 200(500)MG
1000 TABLET,CHEWABLE ORAL EVERY 6 HOURS PRN
Status: DISCONTINUED | OUTPATIENT
Start: 2022-01-01 | End: 2022-01-01

## 2022-01-01 RX ORDER — SODIUM CHLORIDE 9 MG/ML
INJECTION, SOLUTION INTRAVENOUS ONCE
Status: COMPLETED | OUTPATIENT
Start: 2022-01-01 | End: 2022-01-01

## 2022-01-01 RX ORDER — ROSUVASTATIN CALCIUM 10 MG/1
10 TABLET, COATED ORAL NIGHTLY
COMMUNITY

## 2022-01-01 RX ORDER — PANTOPRAZOLE SODIUM 40 MG/1
40 TABLET, DELAYED RELEASE ORAL
Status: DISCONTINUED | OUTPATIENT
Start: 2022-01-01 | End: 2022-01-01

## 2022-01-01 RX ORDER — FAMOTIDINE 20 MG/1
20 TABLET, FILM COATED ORAL 2 TIMES DAILY PRN
Status: DISCONTINUED | OUTPATIENT
Start: 2022-01-01 | End: 2022-01-01 | Stop reason: ALTCHOICE

## 2022-01-01 RX ORDER — SULFAMETHOXAZOLE AND TRIMETHOPRIM 400; 80 MG/1; MG/1
1 TABLET ORAL
Status: DISCONTINUED | OUTPATIENT
Start: 2022-01-01 | End: 2022-01-01

## 2022-01-01 RX ORDER — HEPARIN SODIUM AND DEXTROSE 10000; 5 [USP'U]/100ML; G/100ML
18 INJECTION INTRAVENOUS ONCE
Status: COMPLETED | OUTPATIENT
Start: 2022-01-01 | End: 2022-01-01

## 2022-01-01 RX ORDER — MYCOPHENOLIC ACID 180 MG/1
720 TABLET, DELAYED RELEASE ORAL
Status: DISCONTINUED | OUTPATIENT
Start: 2022-01-01 | End: 2022-01-01

## 2022-01-01 RX ORDER — SODIUM POLYSTYRENE SULFONATE 4.1 MEQ/G
30 POWDER, FOR SUSPENSION ORAL; RECTAL ONCE
Status: CANCELLED | OUTPATIENT
Start: 2022-01-01 | End: 2022-01-01

## 2022-01-01 RX ORDER — SULFAMETHOXAZOLE AND TRIMETHOPRIM 800; 160 MG/1; MG/1
1 TABLET ORAL
Status: DISCONTINUED | OUTPATIENT
Start: 2022-01-01 | End: 2022-01-01

## 2022-01-01 RX ORDER — ACYCLOVIR 400 MG/1
400 TABLET ORAL 2 TIMES DAILY
Status: COMPLETED | OUTPATIENT
Start: 2022-01-01 | End: 2022-01-01

## 2022-01-01 RX ORDER — HEPARIN SODIUM 1000 [USP'U]/ML
30 INJECTION, SOLUTION INTRAVENOUS; SUBCUTANEOUS ONCE
Status: DISCONTINUED | OUTPATIENT
Start: 2022-01-01 | End: 2022-01-01

## 2022-01-01 RX ORDER — PREDNISONE 2.5 MG
2.5 TABLET ORAL DAILY
COMMUNITY

## 2022-01-01 RX ORDER — MINERAL OIL AND PETROLATUM 150; 830 MG/G; MG/G
OINTMENT OPHTHALMIC 3 TIMES DAILY
Status: DISCONTINUED | OUTPATIENT
Start: 2022-01-01 | End: 2022-01-01

## 2022-01-01 RX ORDER — NICOTINE POLACRILEX 4 MG
30 LOZENGE BUCCAL
Status: DISCONTINUED | OUTPATIENT
Start: 2022-01-01 | End: 2022-01-01

## 2022-01-01 RX ORDER — MINERAL OIL AND PETROLATUM 150; 830 MG/G; MG/G
1 OINTMENT OPHTHALMIC 2 TIMES DAILY
Status: DISCONTINUED | OUTPATIENT
Start: 2022-01-01 | End: 2022-01-01 | Stop reason: SDUPTHER

## 2022-01-01 RX ORDER — DEXAMETHASONE SODIUM PHOSPHATE 10 MG/ML
6 INJECTION, SOLUTION INTRAMUSCULAR; INTRAVENOUS ONCE
Status: COMPLETED | OUTPATIENT
Start: 2022-01-01 | End: 2022-01-01

## 2022-01-01 RX ORDER — DEXMEDETOMIDINE HYDROCHLORIDE 4 UG/ML
INJECTION, SOLUTION INTRAVENOUS CONTINUOUS
Status: DISCONTINUED | OUTPATIENT
Start: 2022-01-01 | End: 2022-01-01

## 2022-01-01 RX ORDER — DOCUSATE SODIUM 100 MG/1
100 CAPSULE, LIQUID FILLED ORAL 2 TIMES DAILY
Status: DISCONTINUED | OUTPATIENT
Start: 2022-01-01 | End: 2022-01-01

## 2022-01-01 RX ORDER — ROSUVASTATIN CALCIUM 10 MG/1
10 TABLET, COATED ORAL NIGHTLY
Status: DISCONTINUED | OUTPATIENT
Start: 2022-01-01 | End: 2022-01-01

## 2022-01-01 RX ORDER — SODIUM CHLORIDE 9 MG/ML
INJECTION, SOLUTION INTRAVENOUS ONCE
Status: DISCONTINUED | OUTPATIENT
Start: 2022-01-01 | End: 2022-01-01

## 2022-01-01 RX ORDER — MINERAL OIL AND PETROLATUM 150; 830 MG/G; MG/G
1 OINTMENT OPHTHALMIC 2 TIMES DAILY
Status: DISCONTINUED | OUTPATIENT
Start: 2022-01-01 | End: 2022-01-01

## 2022-01-01 RX ORDER — METOPROLOL TARTRATE 5 MG/5ML
INJECTION INTRAVENOUS
Status: DISPENSED
Start: 2022-01-01 | End: 2022-01-01

## 2022-01-01 RX ORDER — PHENYLEPHRINE HCL IN 0.9% NACL 1 MG/10 ML
SYRINGE (ML) INTRAVENOUS
Status: COMPLETED
Start: 2022-01-01 | End: 2022-01-01

## 2022-01-01 RX ORDER — PREDNISONE 1 MG/1
2.5 TABLET ORAL
Status: DISCONTINUED | OUTPATIENT
Start: 2022-01-01 | End: 2022-01-01

## 2022-01-01 RX ORDER — METOPROLOL TARTRATE 100 MG/1
50 TABLET ORAL 2 TIMES DAILY
COMMUNITY

## 2022-01-01 RX ORDER — ALBUTEROL SULFATE 90 UG/1
2 AEROSOL, METERED RESPIRATORY (INHALATION)
Status: DISCONTINUED | OUTPATIENT
Start: 2022-01-01 | End: 2022-01-01

## 2022-01-01 RX ORDER — METOPROLOL TARTRATE 5 MG/5ML
5 INJECTION INTRAVENOUS ONCE
Status: COMPLETED | OUTPATIENT
Start: 2022-01-01 | End: 2022-01-01

## 2022-01-01 RX ORDER — HEPARIN SODIUM 1000 [USP'U]/ML
30 INJECTION, SOLUTION INTRAVENOUS; SUBCUTANEOUS ONCE
Status: COMPLETED | OUTPATIENT
Start: 2022-01-01 | End: 2022-01-01

## 2022-01-01 RX ORDER — DEXMEDETOMIDINE HYDROCHLORIDE 4 UG/ML
INJECTION, SOLUTION INTRAVENOUS
Status: DISPENSED
Start: 2022-01-01 | End: 2022-01-01

## 2022-01-01 RX ORDER — HEPARIN SODIUM AND DEXTROSE 10000; 5 [USP'U]/100ML; G/100ML
INJECTION INTRAVENOUS CONTINUOUS
Status: DISCONTINUED | OUTPATIENT
Start: 2022-01-01 | End: 2022-01-01

## 2022-01-01 RX ORDER — ENOXAPARIN SODIUM 100 MG/ML
1 INJECTION SUBCUTANEOUS EVERY 12 HOURS SCHEDULED
Status: DISCONTINUED | OUTPATIENT
Start: 2022-01-01 | End: 2022-01-01

## 2022-01-01 RX ORDER — FUROSEMIDE 10 MG/ML
40 INJECTION INTRAMUSCULAR; INTRAVENOUS DAILY
Status: DISCONTINUED | OUTPATIENT
Start: 2022-01-01 | End: 2022-01-01

## 2022-01-01 RX ORDER — ALBUMIN (HUMAN) 12.5 G/50ML
25 SOLUTION INTRAVENOUS ONCE
Status: COMPLETED | OUTPATIENT
Start: 2022-01-01 | End: 2022-01-01

## 2022-01-01 RX ORDER — MINERAL OIL AND PETROLATUM 150; 830 MG/G; MG/G
OINTMENT OPHTHALMIC 2 TIMES DAILY
Status: DISCONTINUED | OUTPATIENT
Start: 2022-01-01 | End: 2022-01-01

## 2022-01-01 RX ORDER — ENOXAPARIN SODIUM 100 MG/ML
40 INJECTION SUBCUTANEOUS DAILY
Status: DISCONTINUED | OUTPATIENT
Start: 2022-01-01 | End: 2022-01-01

## 2022-01-01 RX ORDER — MIDAZOLAM HYDROCHLORIDE 1 MG/ML
INJECTION INTRAMUSCULAR; INTRAVENOUS
Status: COMPLETED
Start: 2022-01-01 | End: 2022-01-01

## 2022-01-01 RX ORDER — FUROSEMIDE 10 MG/ML
INJECTION INTRAMUSCULAR; INTRAVENOUS
Status: DISCONTINUED
Start: 2022-01-01 | End: 2022-01-01 | Stop reason: WASHOUT

## 2022-01-01 RX ORDER — FUROSEMIDE 10 MG/ML
60 INJECTION INTRAMUSCULAR; INTRAVENOUS ONCE
Status: COMPLETED | OUTPATIENT
Start: 2022-01-01 | End: 2022-01-01

## 2022-01-01 RX ORDER — METOPROLOL TARTRATE 50 MG/1
50 TABLET, FILM COATED ORAL 2 TIMES DAILY
Status: DISCONTINUED | OUTPATIENT
Start: 2022-01-01 | End: 2022-01-01

## 2022-01-01 RX ORDER — HYDROXYZINE HYDROCHLORIDE 25 MG/1
25 TABLET, FILM COATED ORAL 3 TIMES DAILY PRN
Status: DISCONTINUED | OUTPATIENT
Start: 2022-01-01 | End: 2022-01-01

## 2022-01-01 RX ORDER — NICOTINE POLACRILEX 4 MG
15 LOZENGE BUCCAL
Status: DISCONTINUED | OUTPATIENT
Start: 2022-01-01 | End: 2022-01-01

## 2022-01-01 RX ORDER — ALBUTEROL SULFATE 90 UG/1
2 AEROSOL, METERED RESPIRATORY (INHALATION) 4 TIMES DAILY
Status: DISCONTINUED | OUTPATIENT
Start: 2022-01-01 | End: 2022-01-01

## 2022-01-01 RX ORDER — POLYETHYLENE GLYCOL 3350 17 G/17G
17 POWDER, FOR SOLUTION ORAL DAILY PRN
Status: DISCONTINUED | OUTPATIENT
Start: 2022-01-01 | End: 2022-01-01

## 2022-01-01 RX ORDER — DEXTROSE MONOHYDRATE 25 G/50ML
50 INJECTION, SOLUTION INTRAVENOUS AS NEEDED
Status: CANCELLED | OUTPATIENT
Start: 2022-01-01

## 2022-01-01 RX ORDER — TACROLIMUS 0.5 MG/1
0.5 CAPSULE ORAL 2 TIMES DAILY
Status: DISCONTINUED | OUTPATIENT
Start: 2022-01-01 | End: 2022-01-01

## 2022-01-04 PROBLEM — J96.01 ACUTE HYPOXEMIC RESPIRATORY FAILURE DUE TO COVID-19 (HCC): Status: ACTIVE | Noted: 2022-01-01

## 2022-01-04 PROBLEM — U07.1 ACUTE HYPOXEMIC RESPIRATORY FAILURE DUE TO COVID-19 (HCC): Status: ACTIVE | Noted: 2022-01-01

## 2022-01-04 NOTE — ED INITIAL ASSESSMENT (HPI)
PATIENT TO ED VIA EMS PATIENT CO OF WEAKNESS, PATIENT'S  TESTED POSITIVE FOR COVID19. PATIENT DENIES SOB +GENERALIZED WEAKNESS X 3 DAYS AGO.      PATIENT FOUND TO BE HYPOXIC WITH EMS 20%RA, ARRIVES TO ED WITH NRB 68%    HX OF PANCREATIC TRANSPLANT

## 2022-01-04 NOTE — ED QUICK NOTES
Report given to [facility] rn. Will transport patient to floor.     Blood hemolyzed x 3, phlebotomy called and states will draw patient once she is in room 204

## 2022-01-05 NOTE — PLAN OF CARE
Received patient A&O x4 at bedside. Remains on 40L/100% on vapotherm with 15L NRB on top. Patient will occasionally desaturate with movement. Attempted to prone overnight. Purewick placed for patients comfort for voiding. Bed locked and in lowest position.    Problem: Patient Centered Care  Goal: Patient preferences are identified and integrated in the patient's plan of care  Description: Interventions:  - What would you like us to know as we care for you?   - Provide timely, complete, and accurate information to patient/family  - Incorporate patient and family knowledge, values, beliefs, and cultural backgrounds into the planning and delivery of care  - Encourage patient/family to participate in care and decision-making at the level they choose  - Honor patient and family perspectives and choices  Outcome: Progressing     Problem: Patient/Family Goals  Goal: Patient/Family Long Term Goal  Description: Patient's Long Term Goal:     Interventions:  -   - See additional Care Plan goals for specific interventions  Outcome: Progressing  Goal: Patient/Family Short Term Goal  Description: Patient's Short Term Goal:     Interventions:   -   - See additional Care Plan goals for specific interventions  Outcome: Progressing     Problem: PAIN - ADULT  Goal: Verbalizes/displays adequate comfort level or patient's stated pain goal  Description: INTERVENTIONS:  - Encourage pt to monitor pain and request assistance  - Assess pain using appropriate pain scale  - Administer analgesics based on type and severity of pain and evaluate response  - Implement non-pharmacological measures as appropriate and evaluate response  - Consider cultural and social influences on pain and pain management  - Manage/alleviate anxiety  - Utilize distraction and/or relaxation techniques  - Monitor for opioid side effects  - Notify MD/LIP if interventions unsuccessful or patient reports new pain  - Anticipate increased pain with activity and pre-medicate as appropriate  Outcome: Progressing     Problem: RISK FOR INFECTION - ADULT  Goal: Absence of fever/infection during anticipated neutropenic period  Description: INTERVENTIONS  - Monitor WBC  - Administer growth factors as ordered  - Implement neutropenic guidelines  Outcome: Progressing     Problem: SAFETY ADULT - FALL  Goal: Free from fall injury  Description: INTERVENTIONS:  - Assess pt frequently for physical needs  - Identify cognitive and physical deficits and behaviors that affect risk of falls.   - [place] fall precautions as indicated by assessment.  - Educate pt/family on patient safety including physical limitations  - Instruct pt to call for assistance with activity based on assessment  - Modify environment to reduce risk of injury  - Provide assistive devices as appropriate  - Consider OT/PT consult to assist with strengthening/mobility  - Encourage toileting schedule  Outcome: Progressing     Problem: DISCHARGE PLANNING  Goal: Discharge to home or other facility with appropriate resources  Description: INTERVENTIONS:  - Identify barriers to discharge w/pt and caregiver  - Include patient/family/discharge partner in discharge planning  - Arrange for needed discharge resources and transportation as appropriate  - Identify discharge learning needs (meds, wound care, etc)  - Arrange for interpreters to assist at discharge as needed  - Consider post-discharge preferences of patient/family/discharge partner  - Complete POLST form as appropriate  - Assess patient's ability to be responsible for managing their own health  - Refer to Case Management Department for coordinating discharge planning if the patient needs post-hospital services based on physician/LIP order or complex needs related to functional status, cognitive ability or social support system  Outcome: Progressing     Problem: RESPIRATORY - ADULT  Goal: Achieves optimal ventilation and oxygenation  Description: INTERVENTIONS:  - Assess for changes in respiratory status  - Assess for changes in mentation and behavior  - Position to facilitate oxygenation and minimize respiratory effort  - Oxygen supplementation based on oxygen saturation or ABGs  - Provide Smoking Cessation handout, if applicable  - Encourage broncho-pulmonary hygiene including cough, deep breathe, Incentive Spirometry  - Assess the need for suctioning and perform as needed  - Assess and instruct to report SOB or any respiratory difficulty  - Respiratory Therapy support as indicated  - Manage/alleviate anxiety  - Monitor for signs/symptoms of CO2 retention  Outcome: Progressing     Problem: CARDIOVASCULAR - ADULT  Goal: Maintains optimal cardiac output and hemodynamic stability  Description: INTERVENTIONS:  - Monitor vital signs, rhythm, and trends  - Monitor for bleeding, hypotension and signs of decreased cardiac output  - Evaluate effectiveness of vasoactive medications to optimize hemodynamic stability  - Monitor arterial and/or venous puncture sites for bleeding and/or hematoma  - Assess quality of pulses, skin color and temperature  - Assess for signs of decreased coronary artery perfusion - ex.  Angina  - Evaluate fluid balance, assess for edema, trend weights  Outcome: Progressing  Goal: Absence of cardiac arrhythmias or at baseline  Description: INTERVENTIONS:  - Continuous cardiac monitoring, monitor vital signs, obtain 12 lead EKG if indicated  - Evaluate effectiveness of antiarrhythmic and heart rate control medications as ordered  - Initiate emergency measures for life threatening arrhythmias  - Monitor electrolytes and administer replacement therapy as ordered  Outcome: Progressing

## 2022-01-05 NOTE — PLAN OF CARE
Problem: RESPIRATORY - ADULT  Goal: Achieves optimal ventilation and oxygenation  Description: INTERVENTIONS:  - Assess for changes in respiratory status  - Assess for changes in mentation and behavior  - Position to facilitate oxygenation and minimize respiratory effort  - Oxygen supplementation based on oxygen saturation or ABGs  - Provide Smoking Cessation handout, if applicable  - Encourage broncho-pulmonary hygiene including cough, deep breathe, Incentive Spirometry  - Assess the need for suctioning and perform as needed  - Assess and instruct to report SOB or any respiratory difficulty  - Respiratory Therapy support as indicated  - Manage/alleviate anxiety  - Monitor for signs/symptoms of CO2 retention  Outcome: Progressing Pt remains on vapotherm throughout shift 40L/100 w/NRB. Pt tolerating. No acute events on shift.

## 2022-01-05 NOTE — PLAN OF CARE
Received patient at 1200 from ED. Patient on vapotherm 40L at 100% with saturations in the mid 80s. Added non-rebreather mask, then saturations ge[place] into the 90s. BP stable. Sinus tach. Fair appetite. No complaint of SOB. Bed locked in lowest position. Problem: CARDIOVASCULAR - ADULT  Goal: Maintains optimal cardiac output and hemodynamic stability  Description: INTERVENTIONS:  - Monitor vital signs, rhythm, and trends  - Monitor for bleeding, hypotension and signs of decreased cardiac output  - Evaluate effectiveness of vasoactive medications to optimize hemodynamic stability  - Monitor arterial and/or venous puncture sites for bleeding and/or hematoma  - Assess quality of pulses, skin color and temperature  - Assess for signs of decreased coronary artery perfusion - ex.  Angina  - Evaluate fluid balance, assess for edema, trend weights  Outcome: Progressing     Problem: RESPIRATORY - ADULT  Goal: Achieves optimal ventilation and oxygenation  Description: INTERVENTIONS:  - Assess for changes in respiratory status  - Assess for changes in mentation and behavior  - Position to facilitate oxygenation and minimize respiratory effort  - Oxygen supplementation based on oxygen saturation or ABGs  - Provide Smoking Cessation handout, if applicable  - Encourage broncho-pulmonary hygiene including cough, deep breathe, Incentive Spirometry  - Assess the need for suctioning and perform as needed  - Assess and instruct to report SOB or any respiratory difficulty  - Respiratory Therapy support as indicated  - Manage/alleviate anxiety  - Monitor for signs/symptoms of CO2 retention  Outcome: Not Progressing

## 2022-01-06 NOTE — PLAN OF CARE
Problem: RESPIRATORY - ADULT  Goal: Achieves optimal ventilation and oxygenation  Description: INTERVENTIONS:  - Assess for changes in respiratory status  - Assess for changes in mentation and behavior  - Position to facilitate oxygenation and minimize respiratory effort  - Oxygen supplementation based on oxygen saturation or ABGs  - Provide Smoking Cessation handout, if applicable  - Encourage broncho-pulmonary hygiene including cough, deep breathe, Incentive Spirometry  - Assess the need for suctioning and perform as needed  - Assess and instruct to report SOB or any respiratory difficulty  - Respiratory Therapy support as indicated  - Manage/alleviate anxiety  - Monitor for signs/symptoms of CO2 retention  Outcome: Progressing Pt remains on vapotherm 40L/100%. Pt tolerating and maintains sats appropriately. No acute events on shift.

## 2022-01-06 NOTE — PROGRESS NOTES
Urban Safer  46year old  female    Pt received on non rebreather mask and vapotherm both at max settings. Pt appears to be anxious but calms down when things explained in detail and pt encouraged to relax and take her time. Pt has fears of being intubated on the ventilator and wearing BIPAP. Pt has difficulty proning but has been encouraged to do so or lay her side. Pt was gotten out of bed and up to the chair per her request and stayed up for 5 hours. Pt sat in tripod position up in chair and oxygen saturation improved. Non rebreather was removed by RT. Pt had a fair appetite and no complaints of pain during the day. Pt given a one time dose of 40 mg of lasix and voided over 2L. Family updated and care endorsed to [id number] Thien Oliver RN.         Abad Leon RN BSN

## 2022-01-06 NOTE — PLAN OF CARE
Pt is a&o x 4, self proning in bed turned on her right side. Remains on 40L/100% on vapotherm with 15L NRB on top. She tends to get anxious and will desat; breathing exercises and face mask will increase her saturations. She does ambulate and gets to the chair with a standby assist during the day. She has a purewick attached to suction for safety and comfortability. Pt performed oral care with minimal assist. Pt is currently resting, bed in the lowest locked position.      Problem: Patient Centered Care  Goal: Patient preferences are identified and integrated in the patient's plan of care  Description: Interventions:  - What would you like us to know as we care for you?   - Provide timely, complete, and accurate information to patient/family  - Incorporate patient and family knowledge, values, beliefs, and cultural backgrounds into the planning and delivery of care  - Encourage patient/family to participate in care and decision-making at the level they choose  - Honor patient and family perspectives and choices  Outcome: Progressing     Problem: Patient/Family Goals  Goal: Patient/Family Long Term Goal  Description: Patient's Long Term Goal: to return to baseline w/out desaturating    Interventions:  - mindful breathing  - incentive spirometer  - See additional Care Plan goals for specific interventions  Outcome: Progressing  Goal: Patient/Family Short Term Goal  Description: Patient's Short Term Goal: to improve oxygenation    Interventions:   - Incentive spirometry  - See additional Care Plan goals for specific interventions  Outcome: Progressing     Problem: PAIN - ADULT  Goal: Verbalizes/displays adequate comfort level or patient's stated pain goal  Description: INTERVENTIONS:  - Encourage pt to monitor pain and request assistance  - Assess pain using appropriate pain scale  - Administer analgesics based on type and severity of pain and evaluate response  - Implement non-pharmacological measures as appropriate and evaluate response  - Consider cultural and social influences on pain and pain management  - Manage/alleviate anxiety  - Utilize distraction and/or relaxation techniques  - Monitor for opioid side effects  - Notify MD/LIP if interventions unsuccessful or patient reports new pain  - Anticipate increased pain with activity and pre-medicate as appropriate  Outcome: Progressing     Problem: RISK FOR INFECTION - ADULT  Goal: Absence of fever/infection during anticipated neutropenic period  Description: INTERVENTIONS  - Monitor WBC  - Administer growth factors as ordered  - Implement neutropenic guidelines  Outcome: Progressing     Problem: SAFETY ADULT - FALL  Goal: Free from fall injury  Description: INTERVENTIONS:  - Assess pt frequently for physical needs  - Identify cognitive and physical deficits and behaviors that affect risk of falls.   - [place] fall precautions as indicated by assessment.  - Educate pt/family on patient safety including physical limitations  - Instruct pt to call for assistance with activity based on assessment  - Modify environment to reduce risk of injury  - Provide assistive devices as appropriate  - Consider OT/PT consult to assist with strengthening/mobility  - Encourage toileting schedule  Outcome: Progressing     Problem: DISCHARGE PLANNING  Goal: Discharge to home or other facility with appropriate resources  Description: INTERVENTIONS:  - Identify barriers to discharge w/pt and caregiver  - Include patient/family/discharge partner in discharge planning  - Arrange for needed discharge resources and transportation as appropriate  - Identify discharge learning needs (meds, wound care, etc)  - Arrange for interpreters to assist at discharge as needed  - Consider post-discharge preferences of patient/family/discharge partner  - Complete POLST form as appropriate  - Assess patient's ability to be responsible for managing their own health  - Refer to Case Management Department for coordinating discharge planning if the patient needs post-hospital services based on physician/LIP order or complex needs related to functional status, cognitive ability or social support system  Outcome: Progressing     Problem: CARDIOVASCULAR - ADULT  Goal: Maintains optimal cardiac output and hemodynamic stability  Description: INTERVENTIONS:  - Monitor vital signs, rhythm, and trends  - Monitor for bleeding, hypotension and signs of decreased cardiac output  - Evaluate effectiveness of vasoactive medications to optimize hemodynamic stability  - Monitor arterial and/or venous puncture sites for bleeding and/or hematoma  - Assess quality of pulses, skin color and temperature  - Assess for signs of decreased coronary artery perfusion - ex.  Angina  - Evaluate fluid balance, assess for edema, trend weights  Outcome: Progressing  Goal: Absence of cardiac arrhythmias or at baseline  Description: INTERVENTIONS:  - Continuous cardiac monitoring, monitor vital signs, obtain 12 lead EKG if indicated  - Evaluate effectiveness of antiarrhythmic and heart rate control medications as ordered  - Initiate emergency measures for life threatening arrhythmias  - Monitor electrolytes and administer replacement therapy as ordered  Outcome: Progressing     Problem: Anxiety Related to PTSD  Goal: Patient Stated Goal  Description: Goal Description:  to reduce overall anxiety  and manage anxiety symptoms    Objectives: improved coping with anxiety    Interventions:     Deep breathing & face mask     Responsible professional:     Responsible professional:    Outcome: Progressing

## 2022-01-07 NOTE — PLAN OF CARE
Patient is alert and oriented x 4, remains on Vapotherm 40L 90%. Saturates high 90s when side-lying, otherwise high 80s when semi-Mendez's. Nasal spray administered for congestion. Encouraged and reinforced education on deep breathing and incentive spirometry. Purewick in place overnight. Denies pain. Problem: RESPIRATORY - ADULT  Goal: Achieves optimal ventilation and oxygenation  Description: INTERVENTIONS:  - Assess for changes in respiratory status  - Assess for changes in mentation and behavior  - Position to facilitate oxygenation and minimize respiratory effort  - Oxygen supplementation based on oxygen saturation or ABGs  - Provide Smoking Cessation handout, if applicable  - Encourage broncho-pulmonary hygiene including cough, deep breathe, Incentive Spirometry  - Assess the need for suctioning and perform as needed  - Assess and instruct to report SOB or any respiratory difficulty  - Respiratory Therapy support as indicated  - Manage/alleviate anxiety  - Monitor for signs/symptoms of CO2 retention  Outcome: Progressing     Problem: CARDIOVASCULAR - ADULT  Goal: Maintains optimal cardiac output and hemodynamic stability  Description: INTERVENTIONS:  - Monitor vital signs, rhythm, and trends  - Monitor for bleeding, hypotension and signs of decreased cardiac output  - Evaluate effectiveness of vasoactive medications to optimize hemodynamic stability  - Monitor arterial and/or venous puncture sites for bleeding and/or hematoma  - Assess quality of pulses, skin color and temperature  - Assess for signs of decreased coronary artery perfusion - ex.  Angina  - Evaluate fluid balance, assess for edema, trend weights  Outcome: Progressing

## 2022-01-07 NOTE — PLAN OF CARE
Problem: PAIN - ADULT  Goal: Verbalizes/displays adequate comfort level or patient's stated pain goal  Description: INTERVENTIONS:  - Encourage pt to monitor pain and request assistance  - Assess pain using appropriate pain scale  - Administer analgesics based on type and severity of pain and evaluate response  - Implement non-pharmacological measures as appropriate and evaluate response  - Consider cultural and social influences on pain and pain management  - Manage/alleviate anxiety  - Utilize distraction and/or relaxation techniques  - Monitor for opioid side effects  - Notify MD/LIP if interventions unsuccessful or patient reports new pain  - Anticipate increased pain with activity and pre-medicate as appropriate  Outcome: Progressing     Problem: RISK FOR INFECTION - ADULT  Goal: Absence of fever/infection during anticipated neutropenic period  Description: INTERVENTIONS  - Monitor WBC  - Administer growth factors as ordered  - Implement neutropenic guidelines  Outcome: Progressing     Problem: SAFETY ADULT - FALL  Goal: Free from fall injury  Description: INTERVENTIONS:  - Assess pt frequently for physical needs  - Identify cognitive and physical deficits and behaviors that affect risk of falls.   - [place] fall precautions as indicated by assessment.  - Educate pt/family on patient safety including physical limitations  - Instruct pt to call for assistance with activity based on assessment  - Modify environment to reduce risk of injury  - Provide assistive devices as appropriate  - Consider OT/PT consult to assist with strengthening/mobility  - Encourage toileting schedule  Outcome: Progressing

## 2022-01-08 NOTE — PLAN OF CARE
Patient is alert and oriented x 4, remains on Vapotherm 40L 90%. Anxious at times. Side-lying. Nonrebreather mask applied during exertion. Without oxygen, patient quickly desaturates to 70s. Reminders to deep breathe. Nasal spray administered for congestion. Bowel movement x 2. Purewick in place with excellent output. TUMS and Pepcid given for heartburn.     Problem: RESPIRATORY - ADULT  Goal: Achieves optimal ventilation and oxygenation  Description: INTERVENTIONS:  - Assess for changes in respiratory status  - Assess for changes in mentation and behavior  - Position to facilitate oxygenation and minimize respiratory effort  - Oxygen supplementation based on oxygen saturation or ABGs  - Provide Smoking Cessation handout, if applicable  - Encourage broncho-pulmonary hygiene including cough, deep breathe, Incentive Spirometry  - Assess the need for suctioning and perform as needed  - Assess and instruct to report SOB or any respiratory difficulty  - Respiratory Therapy support as indicated  - Manage/alleviate anxiety  - Monitor for signs/symptoms of CO2 retention  Outcome: Not Progressing

## 2022-01-08 NOTE — PROGRESS NOTES
[facility] Note: Route Optimization for Dexamethasone (Decadron)    Patient is currently on Dexamethasone (Decadron) 6 mg IV every 24 hours. The patient meets the criteria to convert to the oral equivalent as established by the IV to Oral conversion protocol approved by the P&T committee. Medication was changed from IV formulation to Dexamethasone (Decadron) 6 mg PO every 24 hours per protocol.       Scarlett Bella PharmD  1/8/2022,  10:27 AM

## 2022-01-09 NOTE — PROGRESS NOTES
Echo shows pulmonary hypertension and LE dopplers positive for DVT. Likely has pulmonary embolus.   Continue full dose anticoagulation

## 2022-01-09 NOTE — PLAN OF CARE
Problem: Patient Centered Care  Goal: Patient preferences are identified and integrated in the patient's plan of care  Description: Interventions:  - What would you like us to know as we care for you?  \"I live at home with my   - Provide timely, complete, and accurate information to patient/family  - Incorporate patient and family knowledge, values, beliefs, and cultural backgrounds into the planning and delivery of care  - Encourage patient/family to participate in care and decision-making at the level they choose  - Honor patient and family perspectives and choices  Outcome: Progressing     Problem: Patient/Family Goals  Goal: Patient/Family Long Term Goal  Description: Patient's Long Term Goal: \"to go home soon\"    Interventions:  - See additional Care Plan goals for specific interventions  Outcome: Progressing  Goal: Patient/Family Short Term Goal  Description: Patient's Short Term Goal: \"to breathe better and wean off my oxygen\"    Interventions:   - See additional Care Plan goals for specific interventions  Outcome: Progressing     Problem: PAIN - ADULT  Goal: Verbalizes/displays adequate comfort level or patient's stated pain goal  Description: INTERVENTIONS:  - Encourage pt to monitor pain and request assistance  - Assess pain using appropriate pain scale  - Administer analgesics based on type and severity of pain and evaluate response  - Implement non-pharmacological measures as appropriate and evaluate response  - Consider cultural and social influences on pain and pain management  - Manage/alleviate anxiety  - Utilize distraction and/or relaxation techniques  - Monitor for opioid side effects  - Notify MD/LIP if interventions unsuccessful or patient reports new pain  - Anticipate increased pain with activity and pre-medicate as appropriate  Outcome: Progressing     Problem: RISK FOR INFECTION - ADULT  Goal: Absence of fever/infection during anticipated neutropenic period  Description: INTERVENTIONS  - Monitor WBC  - Administer growth factors as ordered  - Implement neutropenic guidelines  Outcome: Progressing     Problem: SAFETY ADULT - FALL  Goal: Free from fall injury  Description: INTERVENTIONS:  - Assess pt frequently for physical needs  - Identify cognitive and physical deficits and behaviors that affect risk of falls.   - [place] fall precautions as indicated by assessment.  - Educate pt/family on patient safety including physical limitations  - Instruct pt to call for assistance with activity based on assessment  - Modify environment to reduce risk of injury  - Provide assistive devices as appropriate  - Consider OT/PT consult to assist with strengthening/mobility  - Encourage toileting schedule  Outcome: Progressing     Problem: DISCHARGE PLANNING  Goal: Discharge to home or other facility with appropriate resources  Description: INTERVENTIONS:  - Identify barriers to discharge w/pt and caregiver  - Include patient/family/discharge partner in discharge planning  - Arrange for needed discharge resources and transportation as appropriate  - Identify discharge learning needs (meds, wound care, etc)  - Arrange for interpreters to assist at discharge as needed  - Consider post-discharge preferences of patient/family/discharge partner  - Complete POLST form as appropriate  - Assess patient's ability to be responsible for managing their own health  - Refer to Case Management Department for coordinating discharge planning if the patient needs post-hospital services based on physician/LIP order or complex needs related to functional status, cognitive ability or social support system  Outcome: Progressing     Problem: RESPIRATORY - ADULT  Goal: Achieves optimal ventilation and oxygenation  Description: INTERVENTIONS:  - Assess for changes in respiratory status  - Assess for changes in mentation and behavior  - Position to facilitate oxygenation and minimize respiratory effort  - Oxygen supplementation based on oxygen saturation or ABGs  - Provide Smoking Cessation handout, if applicable  - Encourage broncho-pulmonary hygiene including cough, deep breathe, Incentive Spirometry  - Assess the need for suctioning and perform as needed  - Assess and instruct to report SOB or any respiratory difficulty  - Respiratory Therapy support as indicated  - Manage/alleviate anxiety  - Monitor for signs/symptoms of CO2 retention  Outcome: Progressing     Problem: CARDIOVASCULAR - ADULT  Goal: Maintains optimal cardiac output and hemodynamic stability  Description: INTERVENTIONS:  - Monitor vital signs, rhythm, and trends  - Monitor for bleeding, hypotension and signs of decreased cardiac output  - Evaluate effectiveness of vasoactive medications to optimize hemodynamic stability  - Monitor arterial and/or venous puncture sites for bleeding and/or hematoma  - Assess quality of pulses, skin color and temperature  - Assess for signs of decreased coronary artery perfusion - ex. Angina  - Evaluate fluid balance, assess for edema, trend weights  Outcome: Progressing  Goal: Absence of cardiac arrhythmias or at baseline  Description: INTERVENTIONS:  - Continuous cardiac monitoring, monitor vital signs, obtain 12 lead EKG if indicated  - Evaluate effectiveness of antiarrhythmic and heart rate control medications as ordered  - Initiate emergency measures for life threatening arrhythmias  - Monitor electrolytes and administer replacement therapy as ordered  Outcome: Progressing     Problem: Anxiety Related to PTSD  Goal: Long Term Goal  Outcome: Progressing  Goal: Patient Stated Goal  Description: Goal Description: \"to control my anxiety\"    Objectives: \"deep breathing and relaxation\"    Patient denies significant dyspnea, no chest pain. D-dimer elevated from this am, Dr. Anjana Guy nd Dr. Eulalia Boeck aware and Lovenox increased to BID. High fall risk: bed low and in locked position, call light within reach, nonskid socks applied. VSS. Will continue to monitor.

## 2022-01-09 NOTE — PLAN OF CARE
Pt is alert and oriented x4. Pt states she is \"too tired\" to get to the chair today and will try tomorrow. Pt states she cannot prone because of shoulder issues so she is side-lying. Bed is in lowest position and safety measures in place. Pt's  and mother updated on pt status and plan of care over the phone. Problem: PAIN - ADULT  Goal: Verbalizes/displays adequate comfort level or patient's stated pain goal  Description: INTERVENTIONS:  - Encourage pt to monitor pain and request assistance  - Assess pain using appropriate pain scale  - Administer analgesics based on type and severity of pain and evaluate response  - Implement non-pharmacological measures as appropriate and evaluate response  - Consider cultural and social influences on pain and pain management  - Manage/alleviate anxiety  - Utilize distraction and/or relaxation techniques  - Monitor for opioid side effects  - Notify MD/LIP if interventions unsuccessful or patient reports new pain  - Anticipate increased pain with activity and pre-medicate as appropriate  Outcome: Progressing     Problem: RISK FOR INFECTION - ADULT  Goal: Absence of fever/infection during anticipated neutropenic period  Description: INTERVENTIONS  - Monitor WBC  - Administer growth factors as ordered  - Implement neutropenic guidelines  Outcome: Progressing     Problem: SAFETY ADULT - FALL  Goal: Free from fall injury  Description: INTERVENTIONS:  - Assess pt frequently for physical needs  - Identify cognitive and physical deficits and behaviors that affect risk of falls.   - [place] fall precautions as indicated by assessment.  - Educate pt/family on patient safety including physical limitations  - Instruct pt to call for assistance with activity based on assessment  - Modify environment to reduce risk of injury  - Provide assistive devices as appropriate  - Consider OT/PT consult to assist with strengthening/mobility  - Encourage toileting schedule  Outcome: Progressing     Problem: RESPIRATORY - ADULT  Goal: Achieves optimal ventilation and oxygenation  Description: INTERVENTIONS:  - Assess for changes in respiratory status  - Assess for changes in mentation and behavior  - Position to facilitate oxygenation and minimize respiratory effort  - Oxygen supplementation based on oxygen saturation or ABGs  - Provide Smoking Cessation handout, if applicable  - Encourage broncho-pulmonary hygiene including cough, deep breathe, Incentive Spirometry  - Assess the need for suctioning and perform as needed  - Assess and instruct to report SOB or any respiratory difficulty  - Respiratory Therapy support as indicated  - Manage/alleviate anxiety  - Monitor for signs/symptoms of CO2 retention  Outcome: Not Progressing     Problem: CARDIOVASCULAR - ADULT  Goal: Maintains optimal cardiac output and hemodynamic stability  Description: INTERVENTIONS:  - Monitor vital signs, rhythm, and trends  - Monitor for bleeding, hypotension and signs of decreased cardiac output  - Evaluate effectiveness of vasoactive medications to optimize hemodynamic stability  - Monitor arterial and/or venous puncture sites for bleeding and/or hematoma  - Assess quality of pulses, skin color and temperature  - Assess for signs of decreased coronary artery perfusion - ex.  Angina  - Evaluate fluid balance, assess for edema, trend weights  Outcome: Progressing  Goal: Absence of cardiac arrhythmias or at baseline  Description: INTERVENTIONS:  - Continuous cardiac monitoring, monitor vital signs, obtain 12 lead EKG if indicated  - Evaluate effectiveness of antiarrhythmic and heart rate control medications as ordered  - Initiate emergency measures for life threatening arrhythmias  - Monitor electrolytes and administer replacement therapy as ordered  Outcome: Progressing     Problem: Anxiety Related to PTSD  Goal: Long Term Goal  Outcome: Not Progressing

## 2022-01-10 NOTE — PLAN OF CARE
Patient A&Ox4. Remains on vapotherm 40L 90%. Lungs diminished, unable to tolerate proning. No distress noted. Good appetite. Adequate urine output. Denies any pain. See assessments. Will monitor.     Problem: Patient Centered Care  Goal: Patient preferences are identified and integrated in the patient's plan of care  Description: Interventions:  - What would you like us to know as we care for you?   - Provide timely, complete, and accurate information to patient/family  - Incorporate patient and family knowledge, values, beliefs, and cultural backgrounds into the planning and delivery of care  - Encourage patient/family to participate in care and decision-making at the level they choose  - Honor patient and family perspectives and choices  Outcome: Progressing    Problem: PAIN - ADULT  Goal: Verbalizes/displays adequate comfort level or patient's stated pain goal  Description: INTERVENTIONS:  - Encourage pt to monitor pain and request assistance  - Assess pain using appropriate pain scale  - Administer analgesics based on type and severity of pain and evaluate response  - Implement non-pharmacological measures as appropriate and evaluate response  - Consider cultural and social influences on pain and pain management  - Manage/alleviate anxiety  - Utilize distraction and/or relaxation techniques  - Monitor for opioid side effects  - Notify MD/LIP if interventions unsuccessful or patient reports new pain  - Anticipate increased pain with activity and pre-medicate as appropriate  Outcome: Progressing     Problem: RISK FOR INFECTION - ADULT  Goal: Absence of fever/infection during anticipated neutropenic period  Description: INTERVENTIONS  - Monitor WBC  - Administer growth factors as ordered  - Implement neutropenic guidelines  Outcome: Progressing     Problem: SAFETY ADULT - FALL  Goal: Free from fall injury  Description: INTERVENTIONS:  - Assess pt frequently for physical needs  - Identify cognitive and physical deficits and behaviors that affect risk of falls.   - [place] fall precautions as indicated by assessment.  - Educate pt/family on patient safety including physical limitations  - Instruct pt to call for assistance with activity based on assessment  - Modify environment to reduce risk of injury  - Provide assistive devices as appropriate  - Consider OT/PT consult to assist with strengthening/mobility  - Encourage toileting schedule  Outcome: Progressing     Problem: DISCHARGE PLANNING  Goal: Discharge to home or other facility with appropriate resources  Description: INTERVENTIONS:  - Identify barriers to discharge w/pt and caregiver  - Include patient/family/discharge partner in discharge planning  - Arrange for needed discharge resources and transportation as appropriate  - Identify discharge learning needs (meds, wound care, etc)  - Arrange for interpreters to assist at discharge as needed  - Consider post-discharge preferences of patient/family/discharge partner  - Complete POLST form as appropriate  - Assess patient's ability to be responsible for managing their own health  - Refer to Case Management Department for coordinating discharge planning if the patient needs post-hospital services based on physician/LIP order or complex needs related to functional status, cognitive ability or social support system  Outcome: Progressing     Problem: RESPIRATORY - ADULT  Goal: Achieves optimal ventilation and oxygenation  Description: INTERVENTIONS:  - Assess for changes in respiratory status  - Assess for changes in mentation and behavior  - Position to facilitate oxygenation and minimize respiratory effort  - Oxygen supplementation based on oxygen saturation or ABGs  - Provide Smoking Cessation handout, if applicable  - Encourage broncho-pulmonary hygiene including cough, deep breathe, Incentive Spirometry  - Assess the need for suctioning and perform as needed  - Assess and instruct to report SOB or any respiratory difficulty  - Respiratory Therapy support as indicated  - Manage/alleviate anxiety  - Monitor for signs/symptoms of CO2 retention  Outcome: Progressing     Problem: CARDIOVASCULAR - ADULT  Goal: Maintains optimal cardiac output and hemodynamic stability  Description: INTERVENTIONS:  - Monitor vital signs, rhythm, and trends  - Monitor for bleeding, hypotension and signs of decreased cardiac output  - Evaluate effectiveness of vasoactive medications to optimize hemodynamic stability  - Monitor arterial and/or venous puncture sites for bleeding and/or hematoma  - Assess quality of pulses, skin color and temperature  - Assess for signs of decreased coronary artery perfusion - ex.  Angina  - Evaluate fluid balance, assess for edema, trend weights  Outcome: Progressing  Goal: Absence of cardiac arrhythmias or at baseline  Description: INTERVENTIONS:  - Continuous cardiac monitoring, monitor vital signs, obtain 12 lead EKG if indicated  - Evaluate effectiveness of antiarrhythmic and heart rate control medications as ordered  - Initiate emergency measures for life threatening arrhythmias  - Monitor electrolytes and administer replacement therapy as ordered  Outcome: Progressing

## 2022-01-11 NOTE — PLAN OF CARE
Pt was A+O x4 throughout the shift, she began to desat earlier in the evening but that was resolved by increasing the FiO2 to 100% on 40L. Pt denies any pain and was afebrile. She was assisted to the chair where she had 1 large bm. Pt is resting, bed in the lowest locked position.      Problem: Patient Centered Care  Goal: Patient preferences are identified and integrated in the patient's plan of care  Description: Interventions:  - What would you like us to know as we care for you?   - Provide timely, complete, and accurate information to patient/family  - Incorporate patient and family knowledge, values, beliefs, and cultural backgrounds into the planning and delivery of care  - Encourage patient/family to participate in care and decision-making at the level they choose  - Honor patient and family perspectives and choices  Outcome: Progressing     Problem: Patient/Family Goals  Goal: Patient/Family Long Term Goal  Description: Patient's Long Term Goal:     Interventions:  -   - See additional Care Plan goals for specific interventions  Outcome: Progressing  Goal: Patient/Family Short Term Goal  Description: Patient's Short Term Goal:     Interventions:   -   - See additional Care Plan goals for specific interventions  Outcome: Progressing     Problem: PAIN - ADULT  Goal: Verbalizes/displays adequate comfort level or patient's stated pain goal  Description: INTERVENTIONS:  - Encourage pt to monitor pain and request assistance  - Assess pain using appropriate pain scale  - Administer analgesics based on type and severity of pain and evaluate response  - Implement non-pharmacological measures as appropriate and evaluate response  - Consider cultural and social influences on pain and pain management  - Manage/alleviate anxiety  - Utilize distraction and/or relaxation techniques  - Monitor for opioid side effects  - Notify MD/LIP if interventions unsuccessful or patient reports new pain  - Anticipate increased pain with activity and pre-medicate as appropriate  Outcome: Progressing     Problem: RISK FOR INFECTION - ADULT  Goal: Absence of fever/infection during anticipated neutropenic period  Description: INTERVENTIONS  - Monitor WBC  - Administer growth factors as ordered  - Implement neutropenic guidelines  Outcome: Progressing     Problem: SAFETY ADULT - FALL  Goal: Free from fall injury  Description: INTERVENTIONS:  - Assess pt frequently for physical needs  - Identify cognitive and physical deficits and behaviors that affect risk of falls.   - [place] fall precautions as indicated by assessment.  - Educate pt/family on patient safety including physical limitations  - Instruct pt to call for assistance with activity based on assessment  - Modify environment to reduce risk of injury  - Provide assistive devices as appropriate  - Consider OT/PT consult to assist with strengthening/mobility  - Encourage toileting schedule  Outcome: Progressing     Problem: DISCHARGE PLANNING  Goal: Discharge to home or other facility with appropriate resources  Description: INTERVENTIONS:  - Identify barriers to discharge w/pt and caregiver  - Include patient/family/discharge partner in discharge planning  - Arrange for needed discharge resources and transportation as appropriate  - Identify discharge learning needs (meds, wound care, etc)  - Arrange for interpreters to assist at discharge as needed  - Consider post-discharge preferences of patient/family/discharge partner  - Complete POLST form as appropriate  - Assess patient's ability to be responsible for managing their own health  - Refer to Case Management Department for coordinating discharge planning if the patient needs post-hospital services based on physician/LIP order or complex needs related to functional status, cognitive ability or social support system  Outcome: Progressing     Problem: Anxiety Related to PTSD  Goal: Long Term Goal  Outcome: Progressing  Goal: Patient Stated Goal  Description: Goal Description:     Objecti    Interventions:         Responsible professional:           Responsible professional:    Outcome: Progressing  Goal: Patient Goal  Description: Goal Description:     Objectives: Interventions:         Responsible professional:           Responsible professional:         Responsible professional:   Outcome: Progressing     Problem: RESPIRATORY - ADULT  Goal: Achieves optimal ventilation and oxygenation  Description: INTERVENTIONS:  - Assess for changes in respiratory status  - Assess for changes in mentation and behavior  - Position to facilitate oxygenation and minimize respiratory effort  - Oxygen supplementation based on oxygen saturation or ABGs  - Provide Smoking Cessation handout, if applicable  - Encourage broncho-pulmonary hygiene including cough, deep breathe, Incentive Spirometry  - Assess the need for suctioning and perform as needed  - Assess and instruct to report SOB or any respiratory difficulty  - Respiratory Therapy support as indicated  - Manage/alleviate anxiety  - Monitor for signs/symptoms of CO2 retention  Outcome: Not Progressing     Problem: CARDIOVASCULAR - ADULT  Goal: Maintains optimal cardiac output and hemodynamic stability  Description: INTERVENTIONS:  - Monitor vital signs, rhythm, and trends  - Monitor for bleeding, hypotension and signs of decreased cardiac output  - Evaluate effectiveness of vasoactive medications to optimize hemodynamic stability  - Monitor arterial and/or venous puncture sites for bleeding and/or hematoma  - Assess quality of pulses, skin color and temperature  - Assess for signs of decreased coronary artery perfusion - ex.  Angina  - Evaluate fluid balance, assess for edema, trend weights  Outcome: Not Progressing  Goal: Absence of cardiac arrhythmias or at baseline  Description: INTERVENTIONS:  - Continuous cardiac monitoring, monitor vital signs, obtain 12 lead EKG if indicated  - Evaluate effectiveness of antiarrhythmic and heart rate control medications as ordered  - Initiate emergency measures for life threatening arrhythmias  - Monitor electrolytes and administer replacement therapy as ordered  Outcome: Not Progressing

## 2022-01-12 NOTE — PLAN OF CARE
Problem: PAIN - ADULT  Goal: Verbalizes/displays adequate comfort level or patient's stated pain goal  Description: INTERVENTIONS:  - Encourage pt to monitor pain and request assistance  - Assess pain using appropriate pain scale  - Administer analgesics based on type and severity of pain and evaluate response  - Implement non-pharmacological measures as appropriate and evaluate response  - Consider cultural and social influences on pain and pain management  - Manage/alleviate anxiety  - Utilize distraction and/or relaxation techniques  - Monitor for opioid side effects  - Notify MD/LIP if interventions unsuccessful or patient reports new pain  - Anticipate increased pain with activity and pre-medicate as appropriate  Outcome: Progressing     Problem: RISK FOR INFECTION - ADULT  Goal: Absence of fever/infection during anticipated neutropenic period  Description: INTERVENTIONS  - Monitor WBC  - Administer growth factors as ordered  - Implement neutropenic guidelines  Outcome: Progressing     Problem: SAFETY ADULT - FALL  Goal: Free from fall injury  Description: INTERVENTIONS:  - Assess pt frequently for physical needs  - Identify cognitive and physical deficits and behaviors that affect risk of falls.   - [place] fall precautions as indicated by assessment.  - Educate pt/family on patient safety including physical limitations  - Instruct pt to call for assistance with activity based on assessment  - Modify environment to reduce risk of injury  - Provide assistive devices as appropriate  - Consider OT/PT consult to assist with strengthening/mobility  - Encourage toileting schedule  Outcome: Progressing     Problem: CARDIOVASCULAR - ADULT  Goal: Absence of cardiac arrhythmias or at baseline  Description: INTERVENTIONS:  - Continuous cardiac monitoring, monitor vital signs, obtain 12 lead EKG if indicated  - Evaluate effectiveness of antiarrhythmic and heart rate control medications as ordered  - Initiate emergency measures for life threatening arrhythmias  - Monitor electrolytes and administer replacement therapy as ordered  Outcome: Progressing     Problem: RESPIRATORY - ADULT  Goal: Achieves optimal ventilation and oxygenation  Description: INTERVENTIONS:  - Assess for changes in respiratory status  - Assess for changes in mentation and behavior  - Position to facilitate oxygenation and minimize respiratory effort  - Oxygen supplementation based on oxygen saturation or ABGs  - Provide Smoking Cessation handout, if applicable  - Encourage broncho-pulmonary hygiene including cough, deep breathe, Incentive Spirometry  - Assess the need for suctioning and perform as needed  - Assess and instruct to report SOB or any respiratory difficulty  - Respiratory Therapy support as indicated  - Manage/alleviate anxiety  - Monitor for signs/symptoms of CO2 retention  Outcome: Not Progressing

## 2022-01-13 NOTE — PLAN OF CARE
Problem: PAIN - ADULT  Goal: Verbalizes/displays adequate comfort level or patient's stated pain goal  Description: INTERVENTIONS:  - Encourage pt to monitor pain and request assistance  - Assess pain using appropriate pain scale  - Administer analgesics based on type and severity of pain and evaluate response  - Implement non-pharmacological measures as appropriate and evaluate response  - Consider cultural and social influences on pain and pain management  - Manage/alleviate anxiety  - Utilize distraction and/or relaxation techniques  - Monitor for opioid side effects  - Notify MD/LIP if interventions unsuccessful or patient reports new pain  - Anticipate increased pain with activity and pre-medicate as appropriate  Outcome: Progressing     Problem: RISK FOR INFECTION - ADULT  Goal: Absence of fever/infection during anticipated neutropenic period  Description: INTERVENTIONS  - Monitor WBC  - Administer growth factors as ordered  - Implement neutropenic guidelines  Outcome: Progressing     Problem: SAFETY ADULT - FALL  Goal: Free from fall injury  Description: INTERVENTIONS:  - Assess pt frequently for physical needs  - Identify cognitive and physical deficits and behaviors that affect risk of falls.   - [place] fall precautions as indicated by assessment.  - Educate pt/family on patient safety including physical limitations  - Instruct pt to call for assistance with activity based on assessment  - Modify environment to reduce risk of injury  - Provide assistive devices as appropriate  - Consider OT/PT consult to assist with strengthening/mobility  - Encourage toileting schedule  Outcome: Progressing     Problem: RESPIRATORY - ADULT  Goal: Achieves optimal ventilation and oxygenation  Description: INTERVENTIONS:  - Assess for changes in respiratory status  - Assess for changes in mentation and behavior  - Position to facilitate oxygenation and minimize respiratory effort  - Oxygen supplementation based on oxygen saturation or ABGs  - Provide Smoking Cessation handout, if applicable  - Encourage broncho-pulmonary hygiene including cough, deep breathe, Incentive Spirometry  - Assess the need for suctioning and perform as needed  - Assess and instruct to report SOB or any respiratory difficulty  - Respiratory Therapy support as indicated  - Manage/alleviate anxiety  - Monitor for signs/symptoms of CO2 retention  Outcome: Not Progressing

## 2022-01-13 NOTE — PLAN OF CARE
Problem: Patient Centered Care  Goal: Patient preferences are identified and integrated in the patient's plan of care  Description: Interventions:    - Provide timely, complete, and accurate information to patient/family  - Incorporate patient and family knowledge, values, beliefs, and cultural backgrounds into the planning and delivery of care  - Encourage patient/family to participate in care and decision-making at the level they choose  - Honor patient and family perspectives and choices  Outcome: Progressing     Problem: PAIN - ADULT  Goal: Verbalizes/displays adequate comfort level or patient's stated pain goal  Description: INTERVENTIONS:  - Encourage pt to monitor pain and request assistance  - Assess pain using appropriate pain scale  - Administer analgesics based on type and severity of pain and evaluate response  - Implement non-pharmacological measures as appropriate and evaluate response  - Consider cultural and social influences on pain and pain management  - Manage/alleviate anxiety  - Utilize distraction and/or relaxation techniques  - Monitor for opioid side effects  - Notify MD/LIP if interventions unsuccessful or patient reports new pain  - Anticipate increased pain with activity and pre-medicate as appropriate  Outcome: Progressing  PT DENIES PAIN     Problem: RISK FOR INFECTION - ADULT  Goal: Absence of fever/infection during anticipated neutropenic period  Description: INTERVENTIONS  - Monitor WBC  - Administer growth factors as ordered  - Implement neutropenic guidelines  Outcome: Progressing     Problem: SAFETY ADULT - FALL  Goal: Free from fall injury  Description: INTERVENTIONS:  - Assess pt frequently for physical needs  - Identify cognitive and physical deficits and behaviors that affect risk of falls.   - [place] fall precautions as indicated by assessment.  - Educate pt/family on patient safety including physical limitations  - Instruct pt to call for assistance with activity based on assessment  - Modify environment to reduce risk of injury  - Provide assistive devices as appropriate  - Consider OT/PT consult to assist with strengthening/mobility  - Encourage toileting schedule  Outcome: Progressing     Problem: CARDIOVASCULAR - ADULT  Goal: Absence of cardiac arrhythmias or at baseline  Description: INTERVENTIONS:  - Continuous cardiac monitoring, monitor vital signs, obtain 12 lead EKG if indicated  - Evaluate effectiveness of antiarrhythmic and heart rate control medications as ordered  - Initiate emergency measures for life threatening arrhythmias  - Monitor electrolytes and administer replacement therapy as ordered  Outcome: Progressing  Pt remains NSR, bouts of Tachycardia 110, asymptomatic.

## 2022-01-13 NOTE — PLAN OF CARE
Problem: Patient Centered Care  Goal: Patient preferences are identified and integrated in the patient's plan of care  Description: Interventions:  - What would you like us to know as we care for you?   - Provide timely, complete, and accurate information to patient/family  - Incorporate patient and family knowledge, values, beliefs, and cultural backgrounds into the planning and delivery of care  - Encourage patient/family to participate in care and decision-making at the level they choose  - Honor patient and family perspectives and choices  Outcome: Progressing     Problem: Patient/Family Goals  Goal: Patient/Family Long Term Goal  Description: Patient's Long Term Goal:     Interventions:  -   - See additional Care Plan goals for specific interventions  Outcome: Progressing  Goal: Patient/Family Short Term Goal  Description: Patient's Short Term Goal:     Interventions:   -   - See additional Care Plan goals for specific interventions  Outcome: Progressing     Problem: PAIN - ADULT  Goal: Verbalizes/displays adequate comfort level or patient's stated pain goal  Description: INTERVENTIONS:  - Encourage pt to monitor pain and request assistance  - Assess pain using appropriate pain scale  - Administer analgesics based on type and severity of pain and evaluate response  - Implement non-pharmacological measures as appropriate and evaluate response  - Consider cultural and social influences on pain and pain management  - Manage/alleviate anxiety  - Utilize distraction and/or relaxation techniques  - Monitor for opioid side effects  - Notify MD/LIP if interventions unsuccessful or patient reports new pain  - Anticipate increased pain with activity and pre-medicate as appropriate  Outcome: Progressing     Problem: RISK FOR INFECTION - ADULT  Goal: Absence of fever/infection during anticipated neutropenic period  Description: INTERVENTIONS  - Monitor WBC  - Administer growth factors as ordered  - Implement neutropenic guidelines  Outcome: Progressing     Problem: SAFETY ADULT - FALL  Goal: Free from fall injury  Description: INTERVENTIONS:  - Assess pt frequently for physical needs  - Identify cognitive and physical deficits and behaviors that affect risk of falls.   - [place] fall precautions as indicated by assessment.  - Educate pt/family on patient safety including physical limitations  - Instruct pt to call for assistance with activity based on assessment  - Modify environment to reduce risk of injury  - Provide assistive devices as appropriate  - Consider OT/PT consult to assist with strengthening/mobility  - Encourage toileting schedule  Outcome: Progressing     Problem: DISCHARGE PLANNING  Goal: Discharge to home or other facility with appropriate resources  Description: INTERVENTIONS:  - Identify barriers to discharge w/pt and caregiver  - Include patient/family/discharge partner in discharge planning  - Arrange for needed discharge resources and transportation as appropriate  - Identify discharge learning needs (meds, wound care, etc)  - Arrange for interpreters to assist at discharge as needed  - Consider post-discharge preferences of patient/family/discharge partner  - Complete POLST form as appropriate  - Assess patient's ability to be responsible for managing their own health  - Refer to Case Management Department for coordinating discharge planning if the patient needs post-hospital services based on physician/LIP order or complex needs related to functional status, cognitive ability or social support system  Outcome: Progressing     Problem: RESPIRATORY - ADULT  Goal: Achieves optimal ventilation and oxygenation  Description: INTERVENTIONS:  - Assess for changes in respiratory status  - Assess for changes in mentation and behavior  - Position to facilitate oxygenation and minimize respiratory effort  - Oxygen supplementation based on oxygen saturation or ABGs  - Provide Smoking Cessation handout, if applicable  - Encourage broncho-pulmonary hygiene including cough, deep breathe, Incentive Spirometry  - Assess the need for suctioning and perform as needed  - Assess and instruct to report SOB or any respiratory difficulty  - Respiratory Therapy support as indicated  - Manage/alleviate anxiety  - Monitor for signs/symptoms of CO2 retention  Outcome: Progressing     Problem: CARDIOVASCULAR - ADULT  Goal: Maintains optimal cardiac output and hemodynamic stability  Description: INTERVENTIONS:  - Monitor vital signs, rhythm, and trends  - Monitor for bleeding, hypotension and signs of decreased cardiac output  - Evaluate effectiveness of vasoactive medications to optimize hemodynamic stability  - Monitor arterial and/or venous puncture sites for bleeding and/or hematoma  - Assess quality of pulses, skin color and temperature  - Assess for signs of decreased coronary artery perfusion - ex.  Angina  - Evaluate fluid balance, assess for edema, trend weights  Outcome: Progressing  Goal: Absence of cardiac arrhythmias or at baseline  Description: INTERVENTIONS:  - Continuous cardiac monitoring, monitor vital signs, obtain 12 lead EKG if indicated  - Evaluate effectiveness of antiarrhythmic and heart rate control medications as ordered  - Initiate emergency measures for life threatening arrhythmias  - Monitor electrolytes and administer replacement therapy as ordered  Outcome: Progressing     Problem: Anxiety Related to PTSD  Goal: Long Term Goal  Outcome: Progressing  Goal: Patient Stated Goal  Description: Goal Description:      Outcome: Progressing  Goal: Patient Goal  Description:   Outcome: Progressing

## 2022-01-14 NOTE — SIGNIFICANT EVENT
Patient with worsening hypoxia despite attempts at proning/bipap sats in 70's. Decision made to intubate. Rapid sequence intubation:   Indication for the procedure was hypoxia. The patient was preoxygenated with 100% oxygen by face mask. The patient was given the following IV medications:  succh/etomidate . The patient was orally endotracheally intubated under direct visualization with a 7.5 ETT. In line stabilization was performed during the procedure. There was good misting on the tube; breath sounds were auscultated equally bilaterally; good color change with [place] CO2 detector. Chest X-ray . The procedure was performed by myself.

## 2022-01-14 NOTE — PLAN OF CARE
Problem: ALTERED NUTRIENT INTAKE - ADULT  Goal: Nutrient intake appropriate for improving, restoring or maintaining nutritional needs  Description: INTERVENTIONS:  - Assess nutritional status and recommend course of action  - Monitor oral intake, labs, and treatment plans  - Recommend appropriate diets, oral nutritional supplements, and vitamin/mineral supplements  - Recommend, monitor, and adjust tube feedings and TPN/PPN based on assessed needs  - Provide specific nutrition education as appropriate  Outcome: Progressing

## 2022-01-14 NOTE — PLAN OF CARE
Problem: PAIN - ADULT  Goal: Verbalizes/displays adequate comfort level or patient's stated pain goal  Description: INTERVENTIONS:  - Encourage pt to monitor pain and request assistance  - Assess pain using appropriate pain scale  - Administer analgesics based on type and severity of pain and evaluate response  - Implement non-pharmacological measures as appropriate and evaluate response  - Consider cultural and social influences on pain and pain management  - Manage/alleviate anxiety  - Utilize distraction and/or relaxation techniques  - Monitor for opioid side effects  - Notify MD/LIP if interventions unsuccessful or patient reports new pain  - Anticipate increased pain with activity and pre-medicate as appropriate  Outcome: Progressing     Problem: RISK FOR INFECTION - ADULT  Goal: Absence of fever/infection during anticipated neutropenic period  Description: INTERVENTIONS  - Monitor WBC  - Administer growth factors as ordered  - Implement neutropenic guidelines  Outcome: Progressing     Problem: SAFETY ADULT - FALL  Goal: Free from fall injury  Description: INTERVENTIONS:  - Assess pt frequently for physical needs  - Identify cognitive and physical deficits and behaviors that affect risk of falls.   - [place] fall precautions as indicated by assessment.  - Educate pt/family on patient safety including physical limitations  - Instruct pt to call for assistance with activity based on assessment  - Modify environment to reduce risk of injury  - Provide assistive devices as appropriate  - Consider OT/PT consult to assist with strengthening/mobility  - Encourage toileting schedule  Outcome: Progressing     Problem: DISCHARGE PLANNING  Goal: Discharge to home or other facility with appropriate resources  Description: INTERVENTIONS:  - Identify barriers to discharge w/pt and caregiver  - Include patient/family/discharge partner in discharge planning  - Arrange for needed discharge resources and transportation as appropriate  - Identify discharge learning needs (meds, wound care, etc)  - Arrange for interpreters to assist at discharge as needed  - Consider post-discharge preferences of patient/family/discharge partner  - Complete POLST form as appropriate  - Assess patient's ability to be responsible for managing their own health  - Refer to Case Management Department for coordinating discharge planning if the patient needs post-hospital services based on physician/LIP order or complex needs related to functional status, cognitive ability or social support system  Outcome: Progressing     Problem: CARDIOVASCULAR - ADULT  Goal: Absence of cardiac arrhythmias or at baseline  Description: INTERVENTIONS:  - Continuous cardiac monitoring, monitor vital signs, obtain 12 lead EKG if indicated  - Evaluate effectiveness of antiarrhythmic and heart rate control medications as ordered  - Initiate emergency measures for life threatening arrhythmias  - Monitor electrolytes and administer replacement therapy as ordered  Outcome: Progressing     Problem: Anxiety Related to PTSD  Goal: Long Term Goal  Outcome: Progressing  Goal: Patient Stated Goal  Description: Goal Description:     Objectives: Interventions:         Responsible professional:           Responsible professional:    Outcome: Progressing  Goal: Patient Goal  Description: Goal Description:     Objectives:      Interventions:         Responsible professional:           Responsible professional:          Responsible professional:   Outcome: Progressing     Problem: Safety Risk - Non-Violent Restraints  Goal: Patient will remain free from self-harm  Description: INTERVENTIONS:  - Apply the least restrictive restraint to prevent harm  - Notify patient and family of reasons restraints applied  - Assess for any contributing factors to confusion (electrolyte disturbances, delirium, medications)  - Discontinue any unnecessary medical devices as soon as possible  - Assess the patient's physical comfort, circulation, skin condition, hydration, nutrition and elimination needs   - Reorient and redirection as needed  - Assess for the need to continue restraints  1/14/2022 0656 by Scott Lou RN  Outcome: Progressing  1/14/2022 0212 by Scott Lou RN  Outcome: Progressing     Problem: Patient Centered Care  Goal: Patient preferences are identified and integrated in the patient's plan of care  Description: Interventions:  - What would you like us to know as we care for you?   - Provide timely, complete, and accurate information to patient/family  - Incorporate patient and family knowledge, values, beliefs, and cultural backgrounds into the planning and delivery of care  - Encourage patient/family to participate in care and decision-making at the level they choose  - Honor patient and family perspectives and choices  Outcome: Not Progressing     Problem: Patient/Family Goals  Goal: Patient/Family Long Term Goal  Description: Patient's Long Term Goal:     Interventions:  -   - See additional Care Plan goals for specific interventions  Outcome: Not Progressing  Goal: Patient/Family Short Term Goal  Description: Patient's Short Term Goal:     Interventions:   -  - See additional Care Plan goals for specific interventions  Outcome: Not Progressing     Problem: RESPIRATORY - ADULT  Goal: Achieves optimal ventilation and oxygenation  Description: INTERVENTIONS:  - Assess for changes in respiratory status  - Assess for changes in mentation and behavior  - Position to facilitate oxygenation and minimize respiratory effort  - Oxygen supplementation based on oxygen saturation or ABGs  - Provide Smoking Cessation handout, if applicable  - Encourage broncho-pulmonary hygiene including cough, deep breathe, Incentive Spirometry  - Assess the need for suctioning and perform as needed  - Assess and instruct to report SOB or any respiratory difficulty  - Respiratory Therapy support as indicated  - Manage/alleviate anxiety  - Monitor for signs/symptoms of CO2 retention  Outcome: Not Progressing     Problem: CARDIOVASCULAR - ADULT  Goal: Maintains optimal cardiac output and hemodynamic stability  Description: INTERVENTIONS:  - Monitor vital signs, rhythm, and trends  - Monitor for bleeding, hypotension and signs of decreased cardiac output  - Evaluate effectiveness of vasoactive medications to optimize hemodynamic stability  - Monitor arterial and/or venous puncture sites for bleeding and/or hematoma  - Assess quality of pulses, skin color and temperature  - Assess for signs of decreased coronary artery perfusion - ex. Angina  - Evaluate fluid balance, assess for edema, trend weights  Outcome: Not Progressing   Patient has worsening hypoxia overnight. Proning and Bipap attempted but patient's sats remained in 70-80s. Patient intubated. Patient was hypotensive, levophed started. Propofol and Precedex for sedation. Vitals stable currently. O2 improved. Will monitor patient.

## 2022-01-14 NOTE — PLAN OF CARE
Problem: Safety Risk - Non-Violent Restraints  Goal: Patient will remain free from self-harm  Description: INTERVENTIONS:  - Apply the least restrictive restraint to prevent harm  - Notify patient and family of reasons restraints applied  - Assess for any contributing factors to confusion (electrolyte disturbances, delirium, medications)  - Discontinue any unnecessary medical devices as soon as possible  - Assess the patient's physical comfort, circulation, skin condition, hydration, nutrition and elimination needs   - Reorient and redirection as needed  - Assess for the need to continue restraints  Outcome: Progressing   Patient in soft wrist restraints d/t patient safety/patient pulling out lines. Frequent rounding done and restraints removed frequently for patient care, skin checks. No injury noted. Bed in lowest locked position with side rails up and call light within reach. Will continue to monitor patient.

## 2022-01-14 NOTE — CM/SW NOTE
Pt discussed in Interdisciplinary rounds. She is from home with her  and independent at baseline. Pm Hx  kidney and pancreatic transplant 3/2013 on chronic immunosuppression therapy, Type 1 Diabetes, CKD, hypertension neuoropathy. Admitted with acute hypoxemic respiratory failure due to Covid +. Pt was intubated this morning    / to remain available for support and/or discharge planning.      Aminah Rahman MBA MSN, RN CTL/  [id number]

## 2022-01-14 NOTE — PROGRESS NOTES
Pt received on vapo therm 40L 100% with NRB, pt wasn't tolerating vapo therm well, pt agreed to try BiPAP pt continued to decline. Pt was intubated by Dr. Deann Joseph 7.5 ETT 23cm at lips, pt is now on PRVC/AC 12/500/100%1+8,was on [facility] wasn't tolerating this mode. 01/14/22 0351   Vent Information   $ RT Standby Charge (per 15 min) 1  (vent check)   Ventilator Initiation 01/13/22   Interface Invasive   Vent Type AV   Vent ID [id number]   Vent Mode VC/AC   Settings   FiO2 (%) 100 %   Resp Rate (Set) 12   Vt (Set, mL) 500 mL   Waveform Decelerating ramp   PEEP/CPAP (cm H2O) 8 cm H20   PEEP Low (cm H2O) 5 cm H2O   Bias Flow 2   Peak Flow LPM 60   Trigger Sensitivity Flow (L/min) 1 L/min   Trigger Sensitivity Pressure (cm H2O) 3 cm H2O   Humidification Heat and moisture exchanger   Readings   Total RR 35   Minute Ventilation (L/min) 16 L/min   Expiratory Tidal Volume 510 mL   PIP Observed (cm H2O) 21 cm H2O   MAP (cm H2O) 11   PEEP Low (cm H2O) 5 cm H2O   Alarms   High RR 50   Insp Pressure High (cm H2O) 60 cm H2O   Insp Pressure Low (cm H2O) 10 cm H2O   MV High (L/min) 30 L/min   MV Low (L/min) 3 L/min   Apnea Interval (sec) 20 seconds   Apnea Rate 12   Apnea Volume (mL) 500 mL   ETT   Placement Date/Time: 01/13/22 6265   Airway Size: 7.5 mm   Secured at (cm) 23 cm   Suctioned?  Y   Measured From Lips   Secured Location Left   Secured by Commercial tube camejo   Req'd equipment at bedside Bag mask

## 2022-01-14 NOTE — PLAN OF CARE
Problem: Safety Risk - Non-Violent Restraints  Goal: Patient will remain free from self-harm  Description: INTERVENTIONS:  - Apply the least restrictive restraint to prevent harm  - Notify patient and family of reasons restraints applied  - Assess for any contributing factors to confusion (electrolyte disturbances, delirium, medications)  - Discontinue any unnecessary medical devices as soon as possible  - Assess the patient's physical comfort, circulation, skin condition, hydration, nutrition and elimination needs   - Reorient and redirection as needed  - Assess for the need to continue restraints  Outcome: Not Progressing

## 2022-01-14 NOTE — PROGRESS NOTES
Patient was handed off to me at [address]. At 2000 patient desated to 67% on Vapotherm and nonrebreather mask. I encouraged patient to prone. Patient was very reluctant to prone, but I explained the benefits of prone position and how dangerously low her O2 sats were. Patient proned and O2 sats slowly went up to 87-88%. At 2200 patient refused to prone anymore. I again explained why it was important and her O2 sats with her supine and sitting were still 70's. Patient refused to prone. I called Dr. Talha Ernst and received order for Bipap. Bipap applied at 2230. Patient's o2 sats were still 79-80 after 30 minutes and patient had labored breathing. Dr. Talha Ernst at bedside and patient was intubated by Dr. Talha Ernst. Patient's  notified. Patient currently on 100% Fio2 and Peep of 8. VSS. Will continue to monitor patient.

## 2022-01-14 NOTE — PLAN OF CARE
Problem: RESPIRATORY - ADULT  Goal: Achieves optimal ventilation and oxygenation  Description: INTERVENTIONS:  - Assess for changes in respiratory status  - Assess for changes in mentation and behavior  - Position to facilitate oxygenation and minimize respiratory effort  - Oxygen supplementation based on oxygen saturation or ABGs  - Provide Smoking Cessation handout, if applicable  - Encourage broncho-pulmonary hygiene including cough, deep breathe, Incentive Spirometry  - Assess the need for suctioning and perform as needed  - Assess and instruct to report SOB or any respiratory difficulty  - Respiratory Therapy support as indicated  - Manage/alleviate anxiety  - Monitor for signs/symptoms of CO2 retention  Outcome: Progressing     Received patient with below settings,       01/13/22 1710   Oxygen Utilization   O2 Device High flow/High humidity  (+ NRB)   O2 Flow Rate (L/min) 40 L/min   FiO2 (%) 100 %   SpO2 (!) 87 %   Humidity High   [place] 1/2 Full     No change made at this shift.

## 2022-01-14 NOTE — PLAN OF CARE
Problem: PAIN - ADULT  Goal: Verbalizes/displays adequate comfort level or patient's stated pain goal  Description: INTERVENTIONS:  - Encourage pt to monitor pain and request assistance  - Assess pain using appropriate pain scale  - Administer analgesics based on type and severity of pain and evaluate response  - Implement non-pharmacological measures as appropriate and evaluate response  - Consider cultural and social influences on pain and pain management  - Manage/alleviate anxiety  - Utilize distraction and/or relaxation techniques  - Monitor for opioid side effects  - Notify MD/LIP if interventions unsuccessful or patient reports new pain  - Anticipate increased pain with activity and pre-medicate as appropriate  Outcome: Progressing     Problem: RISK FOR INFECTION - ADULT  Goal: Absence of fever/infection during anticipated neutropenic period  Description: INTERVENTIONS  - Monitor WBC  - Administer growth factors as ordered  - Implement neutropenic guidelines  Outcome: Progressing     Problem: SAFETY ADULT - FALL  Goal: Free from fall injury  Description: INTERVENTIONS:  - Assess pt frequently for physical needs  - Identify cognitive and physical deficits and behaviors that affect risk of falls.   - [place] fall precautions as indicated by assessment.  - Educate pt/family on patient safety including physical limitations  - Instruct pt to call for assistance with activity based on assessment  - Modify environment to reduce risk of injury  - Provide assistive devices as appropriate  - Consider OT/PT consult to assist with strengthening/mobility  - Encourage toileting schedule  Outcome: Progressing     Problem: RESPIRATORY - ADULT  Goal: Achieves optimal ventilation and oxygenation  Description: INTERVENTIONS:  - Assess for changes in respiratory status  - Assess for changes in mentation and behavior  - Position to facilitate oxygenation and minimize respiratory effort  - Oxygen supplementation based on oxygen saturation or ABGs  - Provide Smoking Cessation handout, if applicable  - Encourage broncho-pulmonary hygiene including cough, deep breathe, Incentive Spirometry  - Assess the need for suctioning and perform as needed  - Assess and instruct to report SOB or any respiratory difficulty  - Respiratory Therapy support as indicated  - Manage/alleviate anxiety  - Monitor for signs/symptoms of CO2 retention  Outcome: Progressing     Problem: CARDIOVASCULAR - ADULT  Goal: Maintains optimal cardiac output and hemodynamic stability  Description: INTERVENTIONS:  - Monitor vital signs, rhythm, and trends  - Monitor for bleeding, hypotension and signs of decreased cardiac output  - Evaluate effectiveness of vasoactive medications to optimize hemodynamic stability  - Monitor arterial and/or venous puncture sites for bleeding and/or hematoma  - Assess quality of pulses, skin color and temperature  - Assess for signs of decreased coronary artery perfusion - ex.  Angina  - Evaluate fluid balance, assess for edema, trend weights  Outcome: Progressing  Goal: Absence of cardiac arrhythmias or at baseline  Description: INTERVENTIONS:  - Continuous cardiac monitoring, monitor vital signs, obtain 12 lead EKG if indicated  - Evaluate effectiveness of antiarrhythmic and heart rate control medications as ordered  - Initiate emergency measures for life threatening arrhythmias  - Monitor electrolytes and administer replacement therapy as ordered  Outcome: Progressing     Problem: Safety Risk - Non-Violent Restraints  Goal: Patient will remain free from self-harm  Description: INTERVENTIONS:  - Apply the least restrictive restraint to prevent harm  - Notify patient and family of reasons restraints applied  - Assess for any contributing factors to confusion (electrolyte disturbances, delirium, medications)  - Discontinue any unnecessary medical devices as soon as possible  - Assess the patient's physical comfort, circulation, skin condition, hydration, nutrition and elimination needs   - Reorient and redirection as needed  - Assess for the need to continue restraints  1/14/2022 1624 by Gomez Lazcano  Outcome: Not Progressing  1/14/2022 0829 by Gomez Lazcano  Outcome: Not Progressing   Patient remains sedated on vent. Propofol, fentanyl, and levo infusing.      updated on plan of care

## 2022-01-15 NOTE — CM/SW NOTE
[address] [facility] at [phone] and spoke with Brenda DIAMOND in transfer center.     [facility] are still on [employer]

## 2022-01-15 NOTE — RESPIRATORY THERAPY NOTE
Patient received intubated and on ventilator. PRVC/AC 12/500/+8/100%. PEEP increased to 12 this shift, FiO2 currently at 80%. ABG drawn today showing mild acidosis. Respiratory status stable and tolerating ventilator well, suctioned as needed.     Results for [facility] (MRN [id number]) as of 1/14/2022 18:35   1/14/2022 08:16   ABG PH 7.27 (L)   ABG PCO2 46 (H)   ABG PO2 61 (L)   ABG HCO3 20.1 (L)   ABG O2 SATURATION 92.3 (L)   Blood Gas Base Excess -5.9 (L)   Oxygen Delivery Device Vent   Blood Gas Vent Mode A/C   Blood Gas Respiration Rate 12   Oxygen Content 19.1   FiO2 100.00   SAMPLE SITE Left Radial   TIDAL VOLUME 500.0   PEEP 8.0

## 2022-01-15 NOTE — RESPIRATORY THERAPY NOTE
Patient received intubated with 7.5 ETT secured 23 cms at the lips and on ventilator support: A/C/PRVC 12/500/+12/80%. LOUISE BS heard and suctioned as needed. Pt is tolerating vent well, RT to continue monitor patient. Weaned FiO2 down to 70%, pt tolerating well.

## 2022-01-15 NOTE — PLAN OF CARE
Pt tolerating vent .      Problem: Patient Centered Care  Goal: Patient preferences are identified and integrated in the patient's plan of care  Description: Interventions:  - What would you like us to know as we care for you?   - Provide timely, complete, and accurate information to patient/family  - Incorporate patient and family knowledge, values, beliefs, and cultural backgrounds into the planning and delivery of care  - Encourage patient/family to participate in care and decision-making at the level they choose  - Honor patient and family perspectives and choices  Outcome: Progressing     Problem: Patient/Family Goals  Goal: Patient/Family Long Term Goal  Description: Patient's Long Term Goal:     Interventions:  -   - See additional Care Plan goals for specific interventions  Outcome: Progressing  Goal: Patient/Family Short Term Goal  Description: Patient's Short Term Goal:     Interventions:   -  - See additional Care Plan goals for specific interventions  Outcome: Progressing     Problem: PAIN - ADULT  Goal: Verbalizes/displays adequate comfort level or patient's stated pain goal  Description: INTERVENTIONS:  - Encourage pt to monitor pain and request assistance  - Assess pain using appropriate pain scale  - Administer analgesics based on type and severity of pain and evaluate response  - Implement non-pharmacological measures as appropriate and evaluate response  - Consider cultural and social influences on pain and pain management  - Manage/alleviate anxiety  - Utilize distraction and/or relaxation techniques  - Monitor for opioid side effects  - Notify MD/LIP if interventions unsuccessful or patient reports new pain  - Anticipate increased pain with activity and pre-medicate as appropriate  Outcome: Progressing     Problem: RISK FOR INFECTION - ADULT  Goal: Absence of fever/infection during anticipated neutropenic period  Description: INTERVENTIONS  - Monitor WBC  - Administer growth factors as ordered  - Implement neutropenic guidelines  Outcome: Progressing     Problem: SAFETY ADULT - FALL  Goal: Free from fall injury  Description: INTERVENTIONS:  - Assess pt frequently for physical needs  - Identify cognitive and physical deficits and behaviors that affect risk of falls.   - [place] fall precautions as indicated by assessment.  - Educate pt/family on patient safety including physical limitations  - Instruct pt to call for assistance with activity based on assessment  - Modify environment to reduce risk of injury  - Provide assistive devices as appropriate  - Consider OT/PT consult to assist with strengthening/mobility  - Encourage toileting schedule  Outcome: Progressing     Problem: DISCHARGE PLANNING  Goal: Discharge to home or other facility with appropriate resources  Description: INTERVENTIONS:  - Identify barriers to discharge w/pt and caregiver  - Include patient/family/discharge partner in discharge planning  - Arrange for needed discharge resources and transportation as appropriate  - Identify discharge learning needs (meds, wound care, etc)  - Arrange for interpreters to assist at discharge as needed  - Consider post-discharge preferences of patient/family/discharge partner  - Complete POLST form as appropriate  - Assess patient's ability to be responsible for managing their own health  - Refer to Case Management Department for coordinating discharge planning if the patient needs post-hospital services based on physician/LIP order or complex needs related to functional status, cognitive ability or social support system  Outcome: Progressing     Problem: RESPIRATORY - ADULT  Goal: Achieves optimal ventilation and oxygenation  Description: INTERVENTIONS:  - Assess for changes in respiratory status  - Assess for changes in mentation and behavior  - Position to facilitate oxygenation and minimize respiratory effort  - Oxygen supplementation based on oxygen saturation or ABGs  - Provide Smoking Cessation handout, if applicable  - Encourage broncho-pulmonary hygiene including cough, deep breathe, Incentive Spirometry  - Assess the need for suctioning and perform as needed  - Assess and instruct to report SOB or any respiratory difficulty  - Respiratory Therapy support as indicated  - Manage/alleviate anxiety  - Monitor for signs/symptoms of CO2 retention  Outcome: Progressing     Problem: CARDIOVASCULAR - ADULT  Goal: Maintains optimal cardiac output and hemodynamic stability  Description: INTERVENTIONS:  - Monitor vital signs, rhythm, and trends  - Monitor for bleeding, hypotension and signs of decreased cardiac output  - Evaluate effectiveness of vasoactive medications to optimize hemodynamic stability  - Monitor arterial and/or venous puncture sites for bleeding and/or hematoma  - Assess quality of pulses, skin color and temperature  - Assess for signs of decreased coronary artery perfusion - ex.  Angina  - Evaluate fluid balance, assess for edema, trend weights  Outcome: Progressing  Goal: Absence of cardiac arrhythmias or at baseline  Description: INTERVENTIONS:  - Continuous cardiac monitoring, monitor vital signs, obtain 12 lead EKG if indicated  - Evaluate effectiveness of antiarrhythmic and heart rate control medications as ordered  - Initiate emergency measures for life threatening arrhythmias  - Monitor electrolytes and administer replacement therapy as ordered  Outcome: Progressing           Problem: Safety Risk - Non-Violent Restraints  Goal: Patient will remain free from self-harm  Description: INTERVENTIONS:  - Apply the least restrictive restraint to prevent harm  - Notify patient and family of reasons restraints applied  - Assess for any contributing factors to confusion (electrolyte disturbances, delirium, medications)  - Discontinue any unnecessary medical devices as soon as possible  - Assess the patient's physical comfort, circulation, skin condition, hydration, nutrition and elimination needs   - Reorient and redirection as needed  - Assess for the need to continue restraints  1/15/2022 0051 by Kari San, RN  Outcome: Not Progressing  1/14/2022 2113 by Kari San, RN  Outcome: Not Progressing

## 2022-01-16 NOTE — RESPIRATORY THERAPY NOTE
Pt remains intubated with ETT size 7.5 @ 23 cm at the lip, on full support with the following settings: PRVC/AC 12/500/+12/80%/1 sec. Pt saturating 94%, suction provided as needed, bilaterally diminished bs. Pt receives QID MDI Albuterol HFA 2 puffs in line with vent. No changes at this time, RT will continue to monitor.

## 2022-01-16 NOTE — PLAN OF CARE
Pt received in bed, sedated. VSS, tele shows SR< Able to follow commands x4 when sedation is paused. Mother updated on POC via telephone. Remains in B SWR d/t attempts to pull @ ETT/lines/medical equipment. Attempting to wean FIO2 as tolerated. Bed locked and in lowest position, bad alarm in use. Will CTM.        Problem: Patient Centered Care  Goal: Patient preferences are identified and integrated in the patient's plan of care  Description: Interventions:  - What would you like us to know as we care for you?   - Provide timely, complete, and accurate information to patient/family  - Incorporate patient and family knowledge, values, beliefs, and cultural backgrounds into the planning and delivery of care  - Encourage patient/family to participate in care and decision-making at the level they choose  - Honor patient and family perspectives and choices  1/16/2022 0239 by Avelino Heaton RN  Outcome: Progressing  1/16/2022 0002 by Avelino Heaton RN  Outcome: Progressing     Problem: Patient/Family Goals  Goal: Patient/Family Long Term Goal  Description: Patient's Long Term Goal:     Interventions:  -   - See additional Care Plan goals for specific interventions  1/16/2022 0239 by Avelino Heaton RN  Outcome: Progressing  1/16/2022 0002 by Avelino Heaton RN  Outcome: Progressing  Goal: Patient/Family Short Term Goal  Description: Patient's Short Term Goal:     Interventions:   -   - See additional Care Plan goals for specific interventions  1/16/2022 0239 by Avelino Heaton RN  Outcome: Progressing  1/16/2022 0002 by Avelino Heaton RN  Outcome: Progressing     Problem: PAIN - ADULT  Goal: Verbalizes/displays adequate comfort level or patient's stated pain goal  Description: INTERVENTIONS:  - Encourage pt to monitor pain and request assistance  - Assess pain using appropriate pain scale  - Administer analgesics based on type and severity of pain and evaluate response  - Implement non-pharmacological measures as appropriate and evaluate response  - Consider cultural and social influences on pain and pain management  - Manage/alleviate anxiety  - Utilize distraction and/or relaxation techniques  - Monitor for opioid side effects  - Notify MD/LIP if interventions unsuccessful or patient reports new pain  - Anticipate increased pain with activity and pre-medicate as appropriate  1/16/2022 0239 by Monica Rajan RN  Outcome: Progressing  1/16/2022 0002 by Monica Rajan RN  Outcome: Progressing     Problem: RISK FOR INFECTION - ADULT  Goal: Absence of fever/infection during anticipated neutropenic period  Description: INTERVENTIONS  - Monitor WBC  - Administer growth factors as ordered  - Implement neutropenic guidelines  1/16/2022 0239 by Monica Rajan RN  Outcome: Progressing  1/16/2022 0002 by Monica Rajan RN  Outcome: Progressing     Problem: SAFETY ADULT - FALL  Goal: Free from fall injury  Description: INTERVENTIONS:  - Assess pt frequently for physical needs  - Identify cognitive and physical deficits and behaviors that affect risk of falls.   - [place] fall precautions as indicated by assessment.  - Educate pt/family on patient safety including physical limitations  - Instruct pt to call for assistance with activity based on assessment  - Modify environment to reduce risk of injury  - Provide assistive devices as appropriate  - Consider OT/PT consult to assist with strengthening/mobility  - Encourage toileting schedule  1/16/2022 0239 by Monica Rajan RN  Outcome: Progressing  1/16/2022 0002 by Monica Rajan RN  Outcome: Progressing     Problem: DISCHARGE PLANNING  Goal: Discharge to home or other facility with appropriate resources  Description: INTERVENTIONS:  - Identify barriers to discharge w/pt and caregiver  - Include patient/family/discharge partner in discharge planning  - Arrange for needed discharge resources and transportation as appropriate  - Identify discharge learning needs (meds, wound care, etc)  - Arrange for interpreters to assist at discharge as needed  - Consider post-discharge preferences of patient/family/discharge partner  - Complete POLST form as appropriate  - Assess patient's ability to be responsible for managing their own health  - Refer to Case Management Department for coordinating discharge planning if the patient needs post-hospital services based on physician/LIP order or complex needs related to functional status, cognitive ability or social support system  1/16/2022 0239 by Mary Grace Mcrae RN  Outcome: Progressing  1/16/2022 0002 by Mary Grace Mcrae RN  Outcome: Progressing     Problem: RESPIRATORY - ADULT  Goal: Achieves optimal ventilation and oxygenation  Description: INTERVENTIONS:  - Assess for changes in respiratory status  - Assess for changes in mentation and behavior  - Position to facilitate oxygenation and minimize respiratory effort  - Oxygen supplementation based on oxygen saturation or ABGs  - Provide Smoking Cessation handout, if applicable  - Encourage broncho-pulmonary hygiene including cough, deep breathe, Incentive Spirometry  - Assess the need for suctioning and perform as needed  - Assess and instruct to report SOB or any respiratory difficulty  - Respiratory Therapy support as indicated  - Manage/alleviate anxiety  - Monitor for signs/symptoms of CO2 retention  1/16/2022 0239 by Mary Grace Mcrae RN  Outcome: Progressing  1/16/2022 0002 by Mary Grace Mcrae RN  Outcome: Progressing     Problem: CARDIOVASCULAR - ADULT  Goal: Maintains optimal cardiac output and hemodynamic stability  Description: INTERVENTIONS:  - Monitor vital signs, rhythm, and trends  - Monitor for bleeding, hypotension and signs of decreased cardiac output  - Evaluate effectiveness of vasoactive medications to optimize hemodynamic stability  - Monitor arterial and/or venous puncture sites for bleeding and/or hematoma  - Assess quality of pulses, skin color and temperature  - Assess for signs of decreased coronary artery perfusion - ex. Angina  - Evaluate fluid balance, assess for edema, trend weights  1/16/2022 0239 by Elsi Palafox RN  Outcome: Progressing  1/16/2022 0002 by Elsi Palafox RN  Outcome: Progressing  Goal: Absence of cardiac arrhythmias or at baseline  Description: INTERVENTIONS:  - Continuous cardiac monitoring, monitor vital signs, obtain 12 lead EKG if indicated  - Evaluate effectiveness of antiarrhythmic and heart rate control medications as ordered  - Initiate emergency measures for life threatening arrhythmias  - Monitor electrolytes and administer replacement therapy as ordered  1/16/2022 0239 by Elsi Palafox RN  Outcome: Progressing  1/16/2022 0002 by Elsi Palafox RN  Outcome: Progressing     Problem: Anxiety Related to PTSD  Goal: Long Term Goal  1/16/2022 0239 by Elsi Palafox RN  Outcome: Progressing  1/16/2022 0002 by Elsi Palafox RN  Outcome: Progressing  Goal: Patient Stated Goal  Description: Goal Description:     Objectives: Interventions:         Responsible professional:           Responsible professional:    1/16/2022 0239 by Elsi Palafox RN  Outcome: Progressing  1/16/2022 0002 by Elsi Palafox RN  Outcome: Progressing  Goal: Patient Goal  Description: Goal Description:     Objectives:      Interventions:         Responsible professional:           Responsible professional:          Responsible professional:   1/16/2022 0239 by Elsi Palafox RN  Outcome: Progressing  1/16/2022 0002 by Elsi Palafox RN  Outcome: Progressing     Problem: Safety Risk - Non-Violent Restraints  Goal: Patient will remain free from self-harm  Description: INTERVENTIONS:  - Apply the least restrictive restraint to prevent harm  - Notify patient and family of reasons restraints applied  - Assess for any contributing factors to confusion (electrolyte disturbances, delirium, medications)  - Discontinue any unnecessary medical devices as soon as possible  - Assess the patient's physical comfort, circulation, skin condition, hydration, nutrition and elimination needs   - Reorient and redirection as needed  - Assess for the need to continue restraints  1/16/2022 0239 by Linda Stauffer RN  Outcome: Progressing  1/16/2022 0002 by Linda Stauffer, RN  Outcome: Progressing

## 2022-01-16 NOTE — PLAN OF CARE
Pt remains in B SWR d/t attempts to pull @ ETT/lines/medical equipment.  Will CTM         Problem: Safety Risk - Non-Violent Restraints  Goal: Patient will remain free from self-harm  Description: INTERVENTIONS:  - Apply the least restrictive restraint to prevent harm  - Notify patient and family of reasons restraints applied  - Assess for any contributing factors to confusion (electrolyte disturbances, delirium, medications)  - Discontinue any unnecessary medical devices as soon as possible  - Assess the patient's physical comfort, circulation, skin condition, hydration, nutrition and elimination needs   - Reorient and redirection as needed  - Assess for the need to continue restraints  Outcome: Progressing

## 2022-01-16 NOTE — PLAN OF CARE
Problem: CARDIOVASCULAR - ADULT  Goal: Absence of cardiac arrhythmias or at baseline  Description: INTERVENTIONS:  - Continuous cardiac monitoring, monitor vital signs, obtain 12 lead EKG if indicated  - Evaluate effectiveness of antiarrhythmic and heart rate control medications as ordered  - Initiate emergency measures for life threatening arrhythmias  - Monitor electrolytes and administer replacement therapy as ordered  Outcome: Progressing     Problem: RESPIRATORY - ADULT  Goal: Achieves optimal ventilation and oxygenation  Description: INTERVENTIONS:  - Assess for changes in respiratory status  - Assess for changes in mentation and behavior  - Position to facilitate oxygenation and minimize respiratory effort  - Oxygen supplementation based on oxygen saturation or ABGs  - Provide Smoking Cessation handout, if applicable  - Encourage broncho-pulmonary hygiene including cough, deep breathe, Incentive Spirometry  - Assess the need for suctioning and perform as needed  - Assess and instruct to report SOB or any respiratory difficulty  - Respiratory Therapy support as indicated  - Manage/alleviate anxiety  - Monitor for signs/symptoms of CO2 retention  Outcome: Not Progressing     Problem: CARDIOVASCULAR - ADULT  Goal: Maintains optimal cardiac output and hemodynamic stability  Description: INTERVENTIONS:  - Monitor vital signs, rhythm, and trends  - Monitor for bleeding, hypotension and signs of decreased cardiac output  - Evaluate effectiveness of vasoactive medications to optimize hemodynamic stability  - Monitor arterial and/or venous puncture sites for bleeding and/or hematoma  - Assess quality of pulses, skin color and temperature  - Assess for signs of decreased coronary artery perfusion - ex.  Angina  - Evaluate fluid balance, assess for edema, trend weights  Outcome: Not Progressing     Problem: Safety Risk - Non-Violent Restraints  Goal: Patient will remain free from self-harm  Description: INTERVENTIONS:  - Apply the least restrictive restraint to prevent harm  - Notify patient and family of reasons restraints applied  - Assess for any contributing factors to confusion (electrolyte disturbances, delirium, medications)  - Discontinue any unnecessary medical devices as soon as possible  - Assess the patient's physical comfort, circulation, skin condition, hydration, nutrition and elimination needs   - Reorient and redirection as needed  - Assess for the need to continue restraints  Outcome: Not Progressing

## 2022-01-17 NOTE — PLAN OF CARE
Wrist restraints remain in place to insure pt does not pull on lines/tubes.     Problem: Safety Risk - Non-Violent Restraints  Goal: Patient will remain free from self-harm  Description: INTERVENTIONS:  - Apply the least restrictive restraint to prevent harm  - Notify patient and family of reasons restraints applied  - Assess for any contributing factors to confusion (electrolyte disturbances, delirium, medications)  - Discontinue any unnecessary medical devices as soon as possible  - Assess the patient's physical comfort, circulation, skin condition, hydration, nutrition and elimination needs   - Reorient and redirection as needed  - Assess for the need to continue restraints  Outcome: Progressing

## 2022-01-17 NOTE — RESPIRATORY THERAPY NOTE
Patient received intubated and on ventilator support. PRVC/AC 12/500/+12/80%. FiO2 titrated throughout shift to maintain SpO2 above 88%, currently at 85% FiO2. No respiratory distress throughout shift, tolerating ventilator well, suctioned as needed. Receiving Albuterol HFA inhaler Q4 through vent.

## 2022-01-17 NOTE — CM/SW NOTE
Received MDO to assist patient's  with FMLA documents    Called ,Al and he has documents from patient's employer but had questions for her HR department before proceeding. He will contact MEGAN/SELVIN when ready to proceed    / to remain available for support and/or discharge planning.      Floridalma Smith MBA MSN, RN CTL/  [id number]

## 2022-01-17 NOTE — PLAN OF CARE
Patient remains on bilateral soft restraints to maintain safety.   Problem: Safety Risk - Non-Violent Restraints  Goal: Patient will remain free from self-harm  Description: INTERVENTIONS:  - Apply the least restrictive restraint to prevent harm  - Notify patient and family of reasons restraints applied  - Assess for any contributing factors to confusion (electrolyte disturbances, delirium, medications)  - Discontinue any unnecessary medical devices as soon as possible  - Assess the patient's physical comfort, circulation, skin condition, hydration, nutrition and elimination needs   - Reorient and redirection as needed  - Assess for the need to continue restraints  Outcome: Not Progressing

## 2022-01-17 NOTE — PLAN OF CARE
Pt sedated. Responds to painful stimuli by grimacing. ETT patent and in place. Exchanging air freely with ventilator assistance. Tolerating present vent settings. No pain apparent. BL wrist restraints in place to insure pt does not dislodge tubes or lines. OG patent and in place. Pt tolerating present feedings. Turned and positioned q2h for comfort and skin integrity. VSS. No acute distress noted. Problem: PAIN - ADULT  Goal: Verbalizes/displays adequate comfort level or patient's stated pain goal  Description: INTERVENTIONS:  - Encourage pt to monitor pain and request assistance  - Assess pain using appropriate pain scale  - Administer analgesics based on type and severity of pain and evaluate response  - Implement non-pharmacological measures as appropriate and evaluate response  - Consider cultural and social influences on pain and pain management  - Manage/alleviate anxiety  - Utilize distraction and/or relaxation techniques  - Monitor for opioid side effects  - Notify MD/LIP if interventions unsuccessful or patient reports new pain  - Anticipate increased pain with activity and pre-medicate as appropriate  Outcome: Progressing     Problem: RISK FOR INFECTION - ADULT  Goal: Absence of fever/infection during anticipated neutropenic period  Description: INTERVENTIONS  - Monitor WBC  - Administer growth factors as ordered  - Implement neutropenic guidelines  Outcome: Progressing     Problem: SAFETY ADULT - FALL  Goal: Free from fall injury  Description: INTERVENTIONS:  - Assess pt frequently for physical needs  - Identify cognitive and physical deficits and behaviors that affect risk of falls.   - [place] fall precautions as indicated by assessment.  - Educate pt/family on patient safety including physical limitations  - Instruct pt to call for assistance with activity based on assessment  - Modify environment to reduce risk of injury  - Provide assistive devices as appropriate  - Consider OT/PT consult to assist with strengthening/mobility  - Encourage toileting schedule  Outcome: Progressing     Problem: RESPIRATORY - ADULT  Goal: Achieves optimal ventilation and oxygenation  Description: INTERVENTIONS:  - Assess for changes in respiratory status  - Assess for changes in mentation and behavior  - Position to facilitate oxygenation and minimize respiratory effort  - Oxygen supplementation based on oxygen saturation or ABGs  - Provide Smoking Cessation handout, if applicable  - Encourage broncho-pulmonary hygiene including cough, deep breathe, Incentive Spirometry  - Assess the need for suctioning and perform as needed  - Assess and instruct to report SOB or any respiratory difficulty  - Respiratory Therapy support as indicated  - Manage/alleviate anxiety  - Monitor for signs/symptoms of CO2 retention  Outcome: Progressing     Problem: Safety Risk - Non-Violent Restraints  Goal: Patient will remain free from self-harm  Description: INTERVENTIONS:  - Apply the least restrictive restraint to prevent harm  - Notify patient and family of reasons restraints applied  - Assess for any contributing factors to confusion (electrolyte disturbances, delirium, medications)  - Discontinue any unnecessary medical devices as soon as possible  - Assess the patient's physical comfort, circulation, skin condition, hydration, nutrition and elimination needs   - Reorient and redirection as needed  - Assess for the need to continue restraints  1/17/2022 0234 by Jose Lewis, RN  Outcome: Progressing  1/16/2022 2322 by Jose Lewis, RN  Outcome: Progressing

## 2022-01-17 NOTE — PROGRESS NOTES
Received PT intubated with a (7.5) ETT secured at (23) on vent with the following settings; BS heard bilaterally, SXN provided as needed. No changes made, RT to continue to monitor.         01/17/22 0055   Vent Information   Vent Mode PRVC/AC   Settings   FiO2 (%) 85 %   Resp Rate (Set) 12   Vt (Set, mL) 500 mL   Waveform Decelerating ramp   PEEP/CPAP (cm H2O) 12 cm H20   PEEP Low (cm H2O) 7 cm H2O   Peak Flow LPM 60   Insp Time (sec) 1 sec   Trigger Sensitivity Flow (L/min) 1 L/min   Humidification Heat and moisture exchanger   Readings   Total RR 14   Minute Ventilation (L/min) 4.8 L/min   Expiratory Tidal Volume 290 mL   PIP Observed (cm H2O) 27 cm H2O   MAP (cm H2O) 15   PEEP Low (cm H2O) 7 cm H2O   Plateau Pressure (cm H2O) 19 cm H2O   Static Compliance (L/cm H2O) 46   Dynamic Compliance (L/cm H2O) 17 L/cm H2O

## 2022-01-18 NOTE — PROGRESS NOTES
01/18/22 0915   Vent Information   Vent Mode PRVC/AC   Settings   FiO2 (%) 100 %   Resp Rate (Set) 28   Vt (Set, mL) 500 mL   PEEP/CPAP (cm H2O) 12 cm H20   received patient on above settings  1055 peep changed to 14  No other changes made  Patient dropped her sats patient was suctioned and lavaged sats improved

## 2022-01-18 NOTE — PLAN OF CARE
Patient became agitated, heart rate in the high 140's, MD aware, see orders. Propofol, precedex and fentanyl infusing. Tolerating tube feeds with minimal residuals. 1 bm during this shift.  updated on plan of care. Problem: PAIN - ADULT  Goal: Verbalizes/displays adequate comfort level or patient's stated pain goal  Description: INTERVENTIONS:  - Encourage pt to monitor pain and request assistance  - Assess pain using appropriate pain scale  - Administer analgesics based on type and severity of pain and evaluate response  - Implement non-pharmacological measures as appropriate and evaluate response  - Consider cultural and social influences on pain and pain management  - Manage/alleviate anxiety  - Utilize distraction and/or relaxation techniques  - Monitor for opioid side effects  - Notify MD/LIP if interventions unsuccessful or patient reports new pain  - Anticipate increased pain with activity and pre-medicate as appropriate  Outcome: Progressing     Problem: RISK FOR INFECTION - ADULT  Goal: Absence of fever/infection during anticipated neutropenic period  Description: INTERVENTIONS  - Monitor WBC  - Administer growth factors as ordered  - Implement neutropenic guidelines  Outcome: Progressing     Problem: SAFETY ADULT - FALL  Goal: Free from fall injury  Description: INTERVENTIONS:  - Assess pt frequently for physical needs  - Identify cognitive and physical deficits and behaviors that affect risk of falls.   - [place] fall precautions as indicated by assessment.  - Educate pt/family on patient safety including physical limitations  - Instruct pt to call for assistance with activity based on assessment  - Modify environment to reduce risk of injury  - Provide assistive devices as appropriate  - Consider OT/PT consult to assist with strengthening/mobility  - Encourage toileting schedule  Outcome: Progressing     Problem: CARDIOVASCULAR - ADULT  Goal: Absence of cardiac arrhythmias or at baseline  Description: INTERVENTIONS:  - Continuous cardiac monitoring, monitor vital signs, obtain 12 lead EKG if indicated  - Evaluate effectiveness of antiarrhythmic and heart rate control medications as ordered  - Initiate emergency measures for life threatening arrhythmias  - Monitor electrolytes and administer replacement therapy as ordered  Outcome: Progressing     Problem: RESPIRATORY - ADULT  Goal: Achieves optimal ventilation and oxygenation  Description: INTERVENTIONS:  - Assess for changes in respiratory status  - Assess for changes in mentation and behavior  - Position to facilitate oxygenation and minimize respiratory effort  - Oxygen supplementation based on oxygen saturation or ABGs  - Provide Smoking Cessation handout, if applicable  - Encourage broncho-pulmonary hygiene including cough, deep breathe, Incentive Spirometry  - Assess the need for suctioning and perform as needed  - Assess and instruct to report SOB or any respiratory difficulty  - Respiratory Therapy support as indicated  - Manage/alleviate anxiety  - Monitor for signs/symptoms of CO2 retention  Outcome: Not Progressing     Problem: CARDIOVASCULAR - ADULT  Goal: Maintains optimal cardiac output and hemodynamic stability  Description: INTERVENTIONS:  - Monitor vital signs, rhythm, and trends  - Monitor for bleeding, hypotension and signs of decreased cardiac output  - Evaluate effectiveness of vasoactive medications to optimize hemodynamic stability  - Monitor arterial and/or venous puncture sites for bleeding and/or hematoma  - Assess quality of pulses, skin color and temperature  - Assess for signs of decreased coronary artery perfusion - ex.  Angina  - Evaluate fluid balance, assess for edema, trend weights  Outcome: Not Progressing     Problem: Safety Risk - Non-Violent Restraints  Goal: Patient will remain free from self-harm  Description: INTERVENTIONS:  - Apply the least restrictive restraint to prevent harm  - Notify patient and family of reasons restraints applied  - Assess for any contributing factors to confusion (electrolyte disturbances, delirium, medications)  - Discontinue any unnecessary medical devices as soon as possible  - Assess the patient's physical comfort, circulation, skin condition, hydration, nutrition and elimination needs   - Reorient and redirection as needed  - Assess for the need to continue restraints  1/18/2022 1651 by Miriam Muller  Outcome: Not Progressing

## 2022-01-18 NOTE — PLAN OF CARE
Problem: Safety Risk - Non-Violent Restraints  Goal: Patient will remain free from self-harm  Description: INTERVENTIONS:  - Apply the least restrictive restraint to prevent harm  - Notify patient and family of reasons restraints applied  - Assess for any contributing factors to confusion (electrolyte disturbances, delirium, medications)  - Discontinue any unnecessary medical devices as soon as possible  - Assess the patient's physical comfort, circulation, skin condition, hydration, nutrition and elimination needs   - Reorient and redirection as needed  - Assess for the need to continue restraints  Outcome: Progressing

## 2022-01-18 NOTE — RESPIRATORY THERAPY NOTE
Pt remains intubated with ETT size 7.5 @ 23 cm at the lip, on full support with the following vent settings: PRVC/AC 28/500/+12/80%. Pt saturating in low 90's, suction provided as needed. Pt had desaturating episode and FiO2 was increased to 100%, currently FiO2 is at 95%, pt tolerating well, RT will continue to monitor.

## 2022-01-18 NOTE — PLAN OF CARE
Problem: PAIN - ADULT  Goal: Verbalizes/displays adequate comfort level or patient's stated pain goal  Description: INTERVENTIONS:  - Encourage pt to monitor pain and request assistance  - Assess pain using appropriate pain scale  - Administer analgesics based on type and severity of pain and evaluate response  - Implement non-pharmacological measures as appropriate and evaluate response  - Consider cultural and social influences on pain and pain management  - Manage/alleviate anxiety  - Utilize distraction and/or relaxation techniques  - Monitor for opioid side effects  - Notify MD/LIP if interventions unsuccessful or patient reports new pain  - Anticipate increased pain with activity and pre-medicate as appropriate  Outcome: Progressing     Problem: RISK FOR INFECTION - ADULT  Goal: Absence of fever/infection during anticipated neutropenic period  Description: INTERVENTIONS  - Monitor WBC  - Administer growth factors as ordered  - Implement neutropenic guidelines  Outcome: Progressing     Problem: SAFETY ADULT - FALL  Goal: Free from fall injury  Description: INTERVENTIONS:  - Assess pt frequently for physical needs  - Identify cognitive and physical deficits and behaviors that affect risk of falls.   - [place] fall precautions as indicated by assessment.  - Educate pt/family on patient safety including physical limitations  - Instruct pt to call for assistance with activity based on assessment  - Modify environment to reduce risk of injury  - Provide assistive devices as appropriate  - Consider OT/PT consult to assist with strengthening/mobility  - Encourage toileting schedule  Outcome: Progressing     Problem: CARDIOVASCULAR - ADULT  Goal: Maintains optimal cardiac output and hemodynamic stability  Description: INTERVENTIONS:  - Monitor vital signs, rhythm, and trends  - Monitor for bleeding, hypotension and signs of decreased cardiac output  - Evaluate effectiveness of vasoactive medications to optimize hemodynamic stability  - Monitor arterial and/or venous puncture sites for bleeding and/or hematoma  - Assess quality of pulses, skin color and temperature  - Assess for signs of decreased coronary artery perfusion - ex.  Angina  - Evaluate fluid balance, assess for edema, trend weights  Outcome: Progressing  Goal: Absence of cardiac arrhythmias or at baseline  Description: INTERVENTIONS:  - Continuous cardiac monitoring, monitor vital signs, obtain 12 lead EKG if indicated  - Evaluate effectiveness of antiarrhythmic and heart rate control medications as ordered  - Initiate emergency measures for life threatening arrhythmias  - Monitor electrolytes and administer replacement therapy as ordered  Outcome: Progressing     Problem: RESPIRATORY - ADULT  Goal: Achieves optimal ventilation and oxygenation  Description: INTERVENTIONS:  - Assess for changes in respiratory status  - Assess for changes in mentation and behavior  - Position to facilitate oxygenation and minimize respiratory effort  - Oxygen supplementation based on oxygen saturation or ABGs  - Provide Smoking Cessation handout, if applicable  - Encourage broncho-pulmonary hygiene including cough, deep breathe, Incentive Spirometry  - Assess the need for suctioning and perform as needed  - Assess and instruct to report SOB or any respiratory difficulty  - Respiratory Therapy support as indicated  - Manage/alleviate anxiety  - Monitor for signs/symptoms of CO2 retention  Outcome: Not Progressing     Problem: Safety Risk - Non-Violent Restraints  Goal: Patient will remain free from self-harm  Description: INTERVENTIONS:  - Apply the least restrictive restraint to prevent harm  - Notify patient and family of reasons restraints applied  - Assess for any contributing factors to confusion (electrolyte disturbances, delirium, medications)  - Discontinue any unnecessary medical devices as soon as possible  - Assess the patient's physical comfort, circulation, skin condition, hydration, nutrition and elimination needs   - Reorient and redirection as needed  - Assess for the need to continue restraints  1/17/2022 1842 by Rae Bryant  Outcome: Not Progressing  1/17/2022 0807 by Rae Bryant  Outcome: Not Progressing

## 2022-01-18 NOTE — PLAN OF CARE
Problem: Safety Risk - Non-Violent Restraints  Goal: Patient will remain free from self-harm  Description: INTERVENTIONS:  - Apply the least restrictive restraint to prevent harm  - Notify patient and family of reasons restraints applied  - Assess for any contributing factors to confusion (electrolyte disturbances, delirium, medications)  - Discontinue any unnecessary medical devices as soon as possible  - Assess the patient's physical comfort, circulation, skin condition, hydration, nutrition and elimination needs   - Reorient and redirection as needed  - Assess for the need to continue restraints  1/18/2022 0123 by Aby Frank RN  Outcome: Progressing  1/17/2022 2147 by Aby Frnak, RN  Outcome: Progressing

## 2022-01-18 NOTE — PLAN OF CARE
No significant changes over night. Pt comfortable and follows commands. Tolerating ventilator settings, weaning as able. Titrating sedation as able to lower propofol due to increase triglycerides. Mother called and updated. Pt comfortable with frequent nursing rounding. Problem: PAIN - ADULT  Goal: Verbalizes/displays adequate comfort level or patient's stated pain goal  Description: INTERVENTIONS:  - Encourage pt to monitor pain and request assistance  - Assess pain using appropriate pain scale  - Administer analgesics based on type and severity of pain and evaluate response  - Implement non-pharmacological measures as appropriate and evaluate response  - Consider cultural and social influences on pain and pain management  - Manage/alleviate anxiety  - Utilize distraction and/or relaxation techniques  - Monitor for opioid side effects  - Notify MD/LIP if interventions unsuccessful or patient reports new pain  - Anticipate increased pain with activity and pre-medicate as appropriate  Outcome: Progressing     Problem: RISK FOR INFECTION - ADULT  Goal: Absence of fever/infection during anticipated neutropenic period  Description: INTERVENTIONS  - Monitor WBC  - Administer growth factors as ordered  - Implement neutropenic guidelines  Outcome: Progressing     Problem: SAFETY ADULT - FALL  Goal: Free from fall injury  Description: INTERVENTIONS:  - Assess pt frequently for physical needs  - Identify cognitive and physical deficits and behaviors that affect risk of falls.   - [place] fall precautions as indicated by assessment.  - Educate pt/family on patient safety including physical limitations  - Instruct pt to call for assistance with activity based on assessment  - Modify environment to reduce risk of injury  - Provide assistive devices as appropriate  - Consider OT/PT consult to assist with strengthening/mobility  - Encourage toileting schedule  Outcome: Progressing     Problem: DISCHARGE PLANNING  Goal: Discharge to home or other facility with appropriate resources  Description: INTERVENTIONS:  - Identify barriers to discharge w/pt and caregiver  - Include patient/family/discharge partner in discharge planning  - Arrange for needed discharge resources and transportation as appropriate  - Identify discharge learning needs (meds, wound care, etc)  - Arrange for interpreters to assist at discharge as needed  - Consider post-discharge preferences of patient/family/discharge partner  - Complete POLST form as appropriate  - Assess patient's ability to be responsible for managing their own health  - Refer to Case Management Department for coordinating discharge planning if the patient needs post-hospital services based on physician/LIP order or complex needs related to functional status, cognitive ability or social support system  Outcome: Progressing     Problem: RESPIRATORY - ADULT  Goal: Achieves optimal ventilation and oxygenation  Description: INTERVENTIONS:  - Assess for changes in respiratory status  - Assess for changes in mentation and behavior  - Position to facilitate oxygenation and minimize respiratory effort  - Oxygen supplementation based on oxygen saturation or ABGs  - Provide Smoking Cessation handout, if applicable  - Encourage broncho-pulmonary hygiene including cough, deep breathe, Incentive Spirometry  - Assess the need for suctioning and perform as needed  - Assess and instruct to report SOB or any respiratory difficulty  - Respiratory Therapy support as indicated  - Manage/alleviate anxiety  - Monitor for signs/symptoms of CO2 retention  Outcome: Progressing     Problem: CARDIOVASCULAR - ADULT  Goal: Maintains optimal cardiac output and hemodynamic stability  Description: INTERVENTIONS:  - Monitor vital signs, rhythm, and trends  - Monitor for bleeding, hypotension and signs of decreased cardiac output  - Evaluate effectiveness of vasoactive medications to optimize hemodynamic stability  - Monitor arterial and/or venous puncture sites for bleeding and/or hematoma  - Assess quality of pulses, skin color and temperature  - Assess for signs of decreased coronary artery perfusion - ex. Angina  - Evaluate fluid balance, assess for edema, trend weights  Outcome: Progressing  Goal: Absence of cardiac arrhythmias or at baseline  Description: INTERVENTIONS:  - Continuous cardiac monitoring, monitor vital signs, obtain 12 lead EKG if indicated  - Evaluate effectiveness of antiarrhythmic and heart rate control medications as ordered  - Initiate emergency measures for life threatening arrhythmias  - Monitor electrolytes and administer replacement therapy as ordered  Outcome: Progressing     Problem: Anxiety Related to PTSD  Goal: Long Term Goal  Outcome: Progressing  Goal: Patient Stated Goal  Description: Goal Description:     Objectives: Interventions:         Responsible professional:           Responsible professional:    Outcome: Progressing  Goal: Patient Goal  Description: Goal Description:     Objectives:      Interventions:         Responsible professional:           Responsible professional:          Responsible professional:   Outcome: Progressing     Problem: Safety Risk - Non-Violent Restraints  Goal: Patient will remain free from self-harm  Description: INTERVENTIONS:  - Apply the least restrictive restraint to prevent harm  - Notify patient and family of reasons restraints applied  - Assess for any contributing factors to confusion (electrolyte disturbances, delirium, medications)  - Discontinue any unnecessary medical devices as soon as possible  - Assess the patient's physical comfort, circulation, skin condition, hydration, nutrition and elimination needs   - Reorient and redirection as needed  - Assess for the need to continue restraints  1/18/2022 0123 by Sharon Tolliver RN  Outcome: Progressing  1/17/2022 2147 by Sharon Tolliver RN  Outcome: Progressing

## 2022-01-18 NOTE — CM/SW NOTE
Spoke to [address] at the [facility] and they are still on a transfer hold. No beds at this time.

## 2022-01-18 NOTE — RESPIRATORY THERAPY NOTE
Advanced ET tube from 23cm at the lip to 25cm per Dr. Rama Darnell order. Bilateral Breathsounds. Chest x-ray taken for ET placement.

## 2022-01-18 NOTE — PLAN OF CARE
Problem: Safety Risk - Non-Violent Restraints  Goal: Patient will remain free from self-harm  Description: INTERVENTIONS:  - Apply the least restrictive restraint to prevent harm  - Notify patient and family of reasons restraints applied  - Assess for any contributing factors to confusion (electrolyte disturbances, delirium, medications)  - Discontinue any unnecessary medical devices as soon as possible  - Assess the patient's physical comfort, circulation, skin condition, hydration, nutrition and elimination needs   - Reorient and redirection as needed  - Assess for the need to continue restraints  1/18/2022 0842 by Malathi Michael  Outcome: Not Progressing  1/18/2022 0841 by Malathi Michael

## 2022-01-18 NOTE — PROGRESS NOTES
01/18/22 0915   Readings   Total RR 35   Minute Ventilation (L/min) 15.2 L/min   Expiratory Tidal Volume 430 mL   PIP Observed (cm H2O) 38 cm H2O   MAP (cm H2O) 22   Plateau Pressure (cm H2O) 21 cm H2O

## 2022-01-19 NOTE — PLAN OF CARE
Bilateral soft wrist restraints remain in place.      Problem: Safety Risk - Non-Violent Restraints  Goal: Patient will remain free from self-harm  Description: INTERVENTIONS:  - Apply the least restrictive restraint to prevent harm  - Notify patient and family of reasons restraints applied  - Assess for any contributing factors to confusion (electrolyte disturbances, delirium, medications)  - Discontinue any unnecessary medical devices as soon as possible  - Assess the patient's physical comfort, circulation, skin condition, hydration, nutrition and elimination needs   - Reorient and redirection as needed  - Assess for the need to continue restraints  Outcome: Progressing

## 2022-01-19 NOTE — CM/SW NOTE
LA paperwork placed in patient's hard chart, notified MD. Henry Arias, [address] [facility]

## 2022-01-19 NOTE — PLAN OF CARE
Patient w/ bilateral soft wrist restraints for safety. Peripheral Neurovascular assessment Q2: WDL. Will continue to monitor.    Problem: Safety Risk - Non-Violent Restraints  Goal: Patient will remain free from self-harm  Description: INTERVENTIONS:  - Apply the least restrictive restraint to prevent harm  - Notify patient and family of reasons restraints applied  - Assess for any contributing factors to confusion (electrolyte disturbances, delirium, medications)  - Discontinue any unnecessary medical devices as soon as possible  - Assess the patient's physical comfort, circulation, skin condition, hydration, nutrition and elimination needs   - Reorient and redirection as needed  - Assess for the need to continue restraints  Outcome: Progressing

## 2022-01-19 NOTE — RESPIRATORY THERAPY NOTE
Pt on current vent settings PRVC/28/500/+16/100% ; ETT 7.5 @ 27 cm. Suctioned pt as needed throughout the night. Pt tolerating and saturating appropriately. No acute changes overnight, RT to continue to monitor.

## 2022-01-19 NOTE — RESPIRATORY THERAPY NOTE
Pt received intubated with ETT size 7.5 @ 25 cm at the lip, on full support with the following settings: PRVC/AC 28/500/+16/100%. Pt saturating 95%, suction as needed. @ 2033 ETT advanced to 27 cm at the lip, chest x-ray taken.

## 2022-01-19 NOTE — PLAN OF CARE
Neuro: Shyam Peralta remains sedated with propofol, Precedex, and fentanyl infusing. Propofol infusion rate decreased from 50 to 35mcg/kg/min, Precedex infusion rate decreased from 1.5 to 1.2mcg/kg/hr, and fentanyl infusion rate decreased from 100 to 50mcg/hr. All infusion titrations yielded no changes in vital signs or neurological response. At this time, Shyam Peralta has reactive round pupils and has a positive cough reflex, but does not respond to any deep pain stimulus or show any purposeful movement of extremities. Bilateral soft wrist restraints remain in place. Resp: Ventilator settings unchanged overnight (PEEP +16, FiO2 100%). ETT advanced another 2cm, to current position of 27cm at the lip, per recommendation of RT. Cardiac: Cardiac status stable overnight with no significant changes. GI: OG to tube feeds. No significant gastric residual volumes. No bowel movement overnight. : Delcid in place. 40mg Lasix administered in PM yielded significant urine output of 1200cc, compared to this AM output of 275cc. Daily Cares: Turns performed regularly and oral care provided. Pillows and some linens changed. Problem: Patient Centered Care  Goal: Patient preferences are identified and integrated in the patient's plan of care  Description: Interventions:  - What would you like us to know as we care for you?  BRINA  - Provide timely, complete, and accurate information to patient/family  - Incorporate patient and family knowledge, values, beliefs, and cultural backgrounds into the planning and delivery of care  - Encourage patient/family to participate in care and decision-making at the level they choose  - Honor patient and family perspectives and choices  Outcome: Progressing     Problem: PAIN - ADULT  Goal: Verbalizes/displays adequate comfort level or patient's stated pain goal  Description: INTERVENTIONS:  - Encourage pt to monitor pain and request assistance  - Assess pain using appropriate pain scale  - Administer analgesics based on type and severity of pain and evaluate response  - Implement non-pharmacological measures as appropriate and evaluate response  - Consider cultural and social influences on pain and pain management  - Manage/alleviate anxiety  - Utilize distraction and/or relaxation techniques  - Monitor for opioid side effects  - Notify MD/LIP if interventions unsuccessful or patient reports new pain  - Anticipate increased pain with activity and pre-medicate as appropriate  Outcome: Progressing     Problem: RISK FOR INFECTION - ADULT  Goal: Absence of fever/infection during anticipated neutropenic period  Description: INTERVENTIONS  - Monitor WBC  - Administer growth factors as ordered  - Implement neutropenic guidelines  Outcome: Progressing     Problem: SAFETY ADULT - FALL  Goal: Free from fall injury  Description: INTERVENTIONS:  - Assess pt frequently for physical needs  - Identify cognitive and physical deficits and behaviors that affect risk of falls. - [place] fall precautions as indicated by assessment.  - Educate pt/family on patient safety including physical limitations  - Instruct pt to call for assistance with activity based on assessment  - Modify environment to reduce risk of injury  - Provide assistive devices as appropriate  - Consider OT/PT consult to assist with strengthening/mobility  - Encourage toileting schedule  Outcome: Progressing     Problem: CARDIOVASCULAR - ADULT  Goal: Maintains optimal cardiac output and hemodynamic stability  Description: INTERVENTIONS:  - Monitor vital signs, rhythm, and trends  - Monitor for bleeding, hypotension and signs of decreased cardiac output  - Evaluate effectiveness of vasoactive medications to optimize hemodynamic stability  - Monitor arterial and/or venous puncture sites for bleeding and/or hematoma  - Assess quality of pulses, skin color and temperature  - Assess for signs of decreased coronary artery perfusion - ex.  Angina  - Evaluate fluid balance, assess for edema, trend weights  Outcome: Progressing  Goal: Absence of cardiac arrhythmias or at baseline  Description: INTERVENTIONS:  - Continuous cardiac monitoring, monitor vital signs, obtain 12 lead EKG if indicated  - Evaluate effectiveness of antiarrhythmic and heart rate control medications as ordered  - Initiate emergency measures for life threatening arrhythmias  - Monitor electrolytes and administer replacement therapy as ordered  Outcome: Progressing     Problem: Safety Risk - Non-Violent Restraints  Goal: Patient will remain free from self-harm  Description: INTERVENTIONS:  - Apply the least restrictive restraint to prevent harm  - Notify patient and family of reasons restraints applied  - Assess for any contributing factors to confusion (electrolyte disturbances, delirium, medications)  - Discontinue any unnecessary medical devices as soon as possible  - Assess the patient's physical comfort, circulation, skin condition, hydration, nutrition and elimination needs   - Reorient and redirection as needed  - Assess for the need to continue restraints  1/19/2022 0541 by Sonny Sloan RN  Outcome: Progressing  1/18/2022 2217 by Sonny Sloan RN  Outcome: Progressing     Problem: DISCHARGE PLANNING  Goal: Discharge to home or other facility with appropriate resources  Description: INTERVENTIONS:  - Identify barriers to discharge w/pt and caregiver  - Include patient/family/discharge partner in discharge planning  - Arrange for needed discharge resources and transportation as appropriate  - Identify discharge learning needs (meds, wound care, etc)  - Arrange for interpreters to assist at discharge as needed  - Consider post-discharge preferences of patient/family/discharge partner  - Complete POLST form as appropriate  - Assess patient's ability to be responsible for managing their own health  - Refer to Case Management Department for coordinating discharge planning if the patient needs post-hospital services based on physician/LIP order or complex needs related to functional status, cognitive ability or social support system  Outcome: Not Progressing     Problem: RESPIRATORY - ADULT  Goal: Achieves optimal ventilation and oxygenation  Description: INTERVENTIONS:  - Assess for changes in respiratory status  - Assess for changes in mentation and behavior  - Position to facilitate oxygenation and minimize respiratory effort  - Oxygen supplementation based on oxygen saturation or ABGs  - Provide Smoking Cessation handout, if applicable  - Encourage broncho-pulmonary hygiene including cough, deep breathe, Incentive Spirometry  - Assess the need for suctioning and perform as needed  - Assess and instruct to report SOB or any respiratory difficulty  - Respiratory Therapy support as indicated  - Manage/alleviate anxiety  - Monitor for signs/symptoms of CO2 retention  Outcome: Not Progressing

## 2022-01-20 NOTE — PROCEDURES
Physician: Radha Moran DO    Procedure: left radial arterial catheter insertion with ultrasound guidance    Informed consent: not obtained (emergent procedure)    Sedation: propofol gtt, fentanyl gtt, precedex    Precautions: cap, mask, sterile gown, sterile gloves, sterile drape, chlorhexidine    Description: Site of needle insertion determined by anatomic landmarks, locating left radial artery by ultrasound. Skin prepped and draped. Ultrasound probe prepared in usual sterile fashion and position of artery verified. Catheter over needle advanced into artery under direct ultrasound guidance with return of bright red blood. Guidewire advanced without resistance. Catheter advanced to its hub over guidewire without resistance. Catheter connected to pressure monitor and arterial waveform noted. Sterile dressing was applied.  Catheter was secured in place    Complications: none  Estimated Blood Loss: none    Radha Moran DO  Pulmonary & Critical Care

## 2022-01-20 NOTE — RESPIRATORY THERAPY NOTE
Received patient intubated with 7.5 ETT secured 27 cms at the lips in prone position with the following vent settings:AC/PC 30 P28 IT 0.75/+12/90% maintaining SpO2 >95%. @2150: RR was increased to 32 based on ABG and Dr Jaye Hopkins orders. LOUISE BS heard and suctioned patient as needed. Pt is tolerating vent OK, RT to continue monitor patient.

## 2022-01-20 NOTE — PLAN OF CARE
Neuro: Linda Holt remains sedated with propofol, Precedex, and fentanyl infusing. Propofol infusion rate decreased from 35 to 30mcg/kg/min, Precedex and fentanyl infusion rates remain the same. No apparent changes in neurological response. Bilateral soft wrist restraints remain in place. Resp: Repeat ABG showed worsening acidosis (pH 6.98, pCO2 119). Dr. Sandeep Malik notified and ordered increase in ventilator rate from 28 to 32 breaths/min, as well as initiation of a sodium bicarbonate infusion. Other ventilator settings remain the same (PEEP +16, FiO2 90%). She remains in prone position. Cardiac: Hypotension at start of shift (BP 65/28 at 2000). Norepinephrine infusion restarted, rate increased to 12 mcg/min in order to achieve systolic blood pressure greater than 90. Currently requires 5 mcg/min to maintain adequate blood pressure. Normal sinus rhythm/sinus tachycardia. Temperatures trending downwards (low: 94.4). Janie hugger applied. GI: OG to tube feeds. No significant gastric residual volumes. FlexiSeal in place with loose brown output. : Delcid in place. Daily Cares: Head/arms turned and pillow support rotated. Passive range of motion performed. Problem: Patient Centered Care  Goal: Patient preferences are identified and integrated in the patient's plan of care  Description: Interventions:  - What would you like us to know as we care for you?  BRINA  - Provide timely, complete, and accurate information to patient/family  - Incorporate patient and family knowledge, values, beliefs, and cultural backgrounds into the planning and delivery of care  - Encourage patient/family to participate in care and decision-making at the level they choose  - Honor patient and family perspectives and choices  Outcome: Progressing    Problem: PAIN - ADULT  Goal: Verbalizes/displays adequate comfort level or patient's stated pain goal  Description: INTERVENTIONS:  - Encourage pt to monitor pain and request assistance  - Assess pain using appropriate pain scale  - Administer analgesics based on type and severity of pain and evaluate response  - Implement non-pharmacological measures as appropriate and evaluate response  - Consider cultural and social influences on pain and pain management  - Manage/alleviate anxiety  - Utilize distraction and/or relaxation techniques  - Monitor for opioid side effects  - Notify MD/LIP if interventions unsuccessful or patient reports new pain  - Anticipate increased pain with activity and pre-medicate as appropriate  Outcome: Progressing     Problem: RISK FOR INFECTION - ADULT  Goal: Absence of fever/infection during anticipated neutropenic period  Description: INTERVENTIONS  - Monitor WBC  - Administer growth factors as ordered  - Implement neutropenic guidelines  Outcome: Progressing     Problem: SAFETY ADULT - FALL  Goal: Free from fall injury  Description: INTERVENTIONS:  - Assess pt frequently for physical needs  - Identify cognitive and physical deficits and behaviors that affect risk of falls.   - [place] fall precautions as indicated by assessment.  - Educate pt/family on patient safety including physical limitations  - Instruct pt to call for assistance with activity based on assessment  - Modify environment to reduce risk of injury  - Provide assistive devices as appropriate  - Consider OT/PT consult to assist with strengthening/mobility  - Encourage toileting schedule  Outcome: Progressing     Problem: CARDIOVASCULAR - ADULT  Goal: Absence of cardiac arrhythmias or at baseline  Description: INTERVENTIONS:  - Continuous cardiac monitoring, monitor vital signs, obtain 12 lead EKG if indicated  - Evaluate effectiveness of antiarrhythmic and heart rate control medications as ordered  - Initiate emergency measures for life threatening arrhythmias  - Monitor electrolytes and administer replacement therapy as ordered  Outcome: Progressing     Problem: Safety Risk - Non-Violent Restraints  Goal: Patient will remain free from self-harm  Description: INTERVENTIONS:  - Apply the least restrictive restraint to prevent harm  - Notify patient and family of reasons restraints applied  - Assess for any contributing factors to confusion (electrolyte disturbances, delirium, medications)  - Discontinue any unnecessary medical devices as soon as possible  - Assess the patient's physical comfort, circulation, skin condition, hydration, nutrition and elimination needs   - Reorient and redirection as needed  - Assess for the need to continue restraints  1/20/2022 0508 by Werner Hemphill RN  Outcome: Progressing  1/19/2022 2235 by Werner Hemphill, RN  Outcome: Progressing

## 2022-01-20 NOTE — PLAN OF CARE
Patient intubated and sedated. Fentanyl, propofol and precedex gtt continued. All other meds administered per MAR. TF continued, minimal residuals noted. Patient proned at 16:00, protocol followed. Delcid Catheter maintained and Delcid Cath care provided, good urine output noted. FlexiSeal placed, good output noted per drainage bag. Patient turned and repositioned every 2 hrs as able. Will continue to monitor. Problem: Patient Centered Care  Goal: Patient preferences are identified and integrated in the patient's plan of care  Description: Interventions:  - What would you like us to know as we care for you?  BRINA  - Provide timely, complete, and accurate information to patient/family  - Incorporate patient and family knowledge, values, beliefs, and cultural backgrounds into the planning and delivery of care  - Encourage patient/family to participate in care and decision-making at the level they choose  - Honor patient and family perspectives and choices  Outcome: Progressing     Problem: RESPIRATORY - ADULT  Goal: Achieves optimal ventilation and oxygenation  Description: INTERVENTIONS:  - Assess for changes in respiratory status  - Assess for changes in mentation and behavior  - Position to facilitate oxygenation and minimize respiratory effort  - Oxygen supplementation based on oxygen saturation or ABGs  - Provide Smoking Cessation handout, if applicable  - Encourage broncho-pulmonary hygiene including cough, deep breathe, Incentive Spirometry  - Assess the need for suctioning and perform as needed  - Assess and instruct to report SOB or any respiratory difficulty  - Respiratory Therapy support as indicated  - Manage/alleviate anxiety  - Monitor for signs/symptoms of CO2 retention  Outcome: Not Progressing     Problem: CARDIOVASCULAR - ADULT  Goal: Maintains optimal cardiac output and hemodynamic stability  Description: INTERVENTIONS:  - Monitor vital signs, rhythm, and trends  - Monitor for bleeding, hypotension and signs of decreased cardiac output  - Evaluate effectiveness of vasoactive medications to optimize hemodynamic stability  - Monitor arterial and/or venous puncture sites for bleeding and/or hematoma  - Assess quality of pulses, skin color and temperature  - Assess for signs of decreased coronary artery perfusion - ex.  Angina  - Evaluate fluid balance, assess for edema, trend weights  Outcome: Not Progressing

## 2022-01-21 NOTE — PLAN OF CARE
Patient intubated and sedated. Levo, Fentanyl, propofol and precedex gtt continued. All other meds administered per MAR. TF continued, no residuals noted. Patient proned at 16:00, protocol followed. Delcid Catheter maintained and Delcid Cath care provided, minimal urine output noted. FlexiSeal maintained, good output noted per drainage bag. Patient turned and repositioned every 2 hrs as able. Will continue to monitor. Problem: Patient Centered Care  Goal: Patient preferences are identified and integrated in the patient's plan of care  Description: Interventions:  - What would you like us to know as we care for you? BRINA  - Provide timely, complete, and accurate information to patient/family  - Incorporate patient and family knowledge, values, beliefs, and cultural backgrounds into the planning and delivery of care  - Encourage patient/family to participate in care and decision-making at the level they choose  - Honor patient and family perspectives and choices  Outcome: Progressing     Problem: RISK FOR INFECTION - ADULT  Goal: Absence of fever/infection during anticipated neutropenic period  Description: INTERVENTIONS  - Monitor WBC  - Administer growth factors as ordered  - Implement neutropenic guidelines  Outcome: Progressing     Problem: SAFETY ADULT - FALL  Goal: Free from fall injury  Description: INTERVENTIONS:  - Assess pt frequently for physical needs  - Identify cognitive and physical deficits and behaviors that affect risk of falls.   - [place] fall precautions as indicated by assessment.  - Educate pt/family on patient safety including physical limitations  - Instruct pt to call for assistance with activity based on assessment  - Modify environment to reduce risk of injury  - Provide assistive devices as appropriate  - Consider OT/PT consult to assist with strengthening/mobility  - Encourage toileting schedule  Outcome: Progressing     Problem: RESPIRATORY - ADULT  Goal: Achieves optimal ventilation and oxygenation  Description: INTERVENTIONS:  - Assess for changes in respiratory status  - Assess for changes in mentation and behavior  - Position to facilitate oxygenation and minimize respiratory effort  - Oxygen supplementation based on oxygen saturation or ABGs  - Provide Smoking Cessation handout, if applicable  - Encourage broncho-pulmonary hygiene including cough, deep breathe, Incentive Spirometry  - Assess the need for suctioning and perform as needed  - Assess and instruct to report SOB or any respiratory difficulty  - Respiratory Therapy support as indicated  - Manage/alleviate anxiety  - Monitor for signs/symptoms of CO2 retention  Outcome: Not Progressing

## 2022-01-21 NOTE — PLAN OF CARE
Problem: RESPIRATORY - ADULT  Goal: Achieves optimal ventilation and oxygenation  Description: INTERVENTIONS:  - Assess for changes in respiratory status  - Assess for changes in mentation and behavior  - Position to facilitate oxygenation and minimize respiratory effort  - Oxygen supplementation based on oxygen saturation or ABGs  - Provide Smoking Cessation handout, if applicable  - Encourage broncho-pulmonary hygiene including cough, deep breathe, Incentive Spirometry  - Assess the need for suctioning and perform as needed  - Assess and instruct to report SOB or any respiratory difficulty  - Respiratory Therapy support as indicated  - Manage/alleviate anxiety  - Monitor for signs/symptoms of CO2 retention  1/21/2022 0506 by Abdulaziz Mobley  Outcome: Progressing Pt received intubated on full support PC 32/iP26/+14/100%. ETT 7.5 secured 27 @ lips. Bilateral BS auscultated. Suction provided as indicated. Pt remains prone throughout shift.

## 2022-01-21 NOTE — PLAN OF CARE
Martina's ventilation improved in the prone position enough that the FiO2 was titrated from 100% to 90%. The bilateral soft wrist restraints were maintained for her safety despite sedation. Problem: Patient Centered Care  Goal: Patient preferences are identified and integrated in the patient's plan of care  Description: Interventions:  - What would you like us to know as we care for you? My  is my point of contact.   - Provide timely, complete, and accurate information to patient/family  - Incorporate patient and family knowledge, values, beliefs, and cultural backgrounds into the planning and delivery of care  - Encourage patient/family to participate in care and decision-making at the level they choose  - Honor patient and family perspectives and choices  Outcome: Progressing     Problem: Patient/Family Goals  Goal: Patient/Family Long Term Goal  Description: Patient's Long Term Goal: to go home (assumed)    Interventions:  - - See additional Care Plan goals for specific interventions  Outcome: Not Progressing  Goal: Patient/Family Short Term Goal  Description: Patient's Short Term Goal: to breathe better    Interventions:   -   - See additional Care Plan goals for specific interventions  Outcome: Not Progressing     Problem: PAIN - ADULT  Goal: Verbalizes/displays adequate comfort level or patient's stated pain goal  Description: INTERVENTIONS:  - Encourage pt to monitor pain and request assistance  - Assess pain using appropriate pain scale  - Administer analgesics based on type and severity of pain and evaluate response  - Implement non-pharmacological measures as appropriate and evaluate response  - Consider cultural and social influences on pain and pain management  - Manage/alleviate anxiety  - Utilize distraction and/or relaxation techniques  - Monitor for opioid side effects  - Notify MD/LIP if interventions unsuccessful or patient reports new pain  - Anticipate increased pain with activity and pre-medicate as appropriate  Outcome: Progressing     Problem: SAFETY ADULT - FALL  Goal: Free from fall injury  Description: INTERVENTIONS:  - Assess pt frequently for physical needs  - Identify cognitive and physical deficits and behaviors that affect risk of falls.   - [place] fall precautions as indicated by assessment.  - Educate pt/family on patient safety including physical limitations  - Instruct pt to call for assistance with activity based on assessment  - Modify environment to reduce risk of injury  - Provide assistive devices as appropriate  - Consider OT/PT consult to assist with strengthening/mobility  - Encourage toileting schedule  Outcome: Progressing     Problem: DISCHARGE PLANNING  Goal: Discharge to home or other facility with appropriate resources  Description: INTERVENTIONS:  - Identify barriers to discharge w/pt and caregiver  - Include patient/family/discharge partner in discharge planning  - Arrange for needed discharge resources and transportation as appropriate  - Identify discharge learning needs (meds, wound care, etc)  - Arrange for interpreters to assist at discharge as needed  - Consider post-discharge preferences of patient/family/discharge partner  - Complete POLST form as appropriate  - Assess patient's ability to be responsible for managing their own health  - Refer to Case Management Department for coordinating discharge planning if the patient needs post-hospital services based on physician/LIP order or complex needs related to functional status, cognitive ability or social support system  Outcome: Not Progressing     Problem: CARDIOVASCULAR - ADULT  Goal: Maintains optimal cardiac output and hemodynamic stability  Description: INTERVENTIONS:  - Monitor vital signs, rhythm, and trends  - Monitor for bleeding, hypotension and signs of decreased cardiac output  - Evaluate effectiveness of vasoactive medications to optimize hemodynamic stability  - Monitor arterial and/or venous puncture sites for bleeding and/or hematoma  - Assess quality of pulses, skin color and temperature  - Assess for signs of decreased coronary artery perfusion - ex.  Angina  - Evaluate fluid balance, assess for edema, trend weights  Outcome: Progressing  Goal: Absence of cardiac arrhythmias or at baseline  Description: INTERVENTIONS:  - Continuous cardiac monitoring, monitor vital signs, obtain 12 lead EKG if indicated  - Evaluate effectiveness of antiarrhythmic and heart rate control medications as ordered  - Initiate emergency measures for life threatening arrhythmias  - Monitor electrolytes and administer replacement therapy as ordered  Outcome: Progressing     Problem: Safety Risk - Non-Violent Restraints  Goal: Patient will remain free from self-harm  Description: INTERVENTIONS:  - Apply the least restrictive restraint to prevent harm  - Notify patient and family of reasons restraints applied  - Assess for any contributing factors to confusion (electrolyte disturbances, delirium, medications)  - Discontinue any unnecessary medical devices as soon as possible  - Assess the patient's physical comfort, circulation, skin condition, hydration, nutrition and elimination needs   - Reorient and redirection as needed  - Assess for the need to continue restraints  1/21/2022 0805 by Stanton Sun RN  Outcome: Not Progressing  1/21/2022 0116 by Stanton Sun RN  Outcome: Not Progressing

## 2022-01-21 NOTE — PLAN OF CARE
Problem: RESPIRATORY - ADULT  Goal: Achieves optimal ventilation and oxygenation  Description: INTERVENTIONS:  - Assess for changes in respiratory status  - Assess for changes in mentation and behavior  - Position to facilitate oxygenation and minimize respiratory effort  - Oxygen supplementation based on oxygen saturation or ABGs  - Provide Smoking Cessation handout, if applicable  - Encourage broncho-pulmonary hygiene including cough, deep breathe, Incentive Spirometry  - Assess the need for suctioning and perform as needed  - Assess and instruct to report SOB or any respiratory difficulty  - Respiratory Therapy support as indicated  - Manage/alleviate anxiety  - Monitor for signs/symptoms of CO2 retention  Outcome: Progressing   Patient remains intubated/mechanically ventilated on full support. ABGs obtained, vent settings adjusted accordingly. Patient was prone at 1600, remains on 100%. Moderate to large amount of thick secretions noted via ETT.

## 2022-01-21 NOTE — PLAN OF CARE
Ms. Maxime Monroy is in the prone position and saturating well. The restraints are in place to maintain her safety despite the sedation medications.      Problem: Safety Risk - Non-Violent Restraints  Goal: Patient will remain free from self-harm  Description: INTERVENTIONS:  - Apply the least restrictive restraint to prevent harm  - Notify patient and family of reasons restraints applied  - Assess for any contributing factors to confusion (electrolyte disturbances, delirium, medications)  - Discontinue any unnecessary medical devices as soon as possible  - Assess the patient's physical comfort, circulation, skin condition, hydration, nutrition and elimination needs   - Reorient and redirection as needed  - Assess for the need to continue restraints  Outcome: Not Progressing

## 2022-01-22 NOTE — PLAN OF CARE
Patient intubated and sedated. Fentanyl, propofol and precedex gtt continued. Heparin gtt continued, next aPTT in the morning. All other meds administered per MAR. TF continued, no residuals noted. Patient was desaturing to 76s, per Dr. Ledbetter File order patient was proned earlier at 2pm today. Delcid Catheter maintained and Delcid Cath care provided, minimal urine output noted. FlexiSeal maintained, small output noted per drainage bag. Patient turned and repositioned every 2 hrs as able. Spouse, Al, updated on plan of care. Will continue to monitor. Problem: Patient Centered Care  Goal: Patient preferences are identified and integrated in the patient's plan of care  Description: Interventions:  - What would you like us to know as we care for you? My  is my point of contact. - Provide timely, complete, and accurate information to patient/family  - Incorporate patient and family knowledge, values, beliefs, and cultural backgrounds into the planning and delivery of care  - Encourage patient/family to participate in care and decision-making at the level they choose  - Honor patient and family perspectives and choices  Outcome: Progressing     Problem: RISK FOR INFECTION - ADULT  Goal: Absence of fever/infection during anticipated neutropenic period  Description: INTERVENTIONS  - Monitor WBC  - Administer growth factors as ordered  - Implement neutropenic guidelines  Outcome: Progressing     Problem: SAFETY ADULT - FALL  Goal: Free from fall injury  Description: INTERVENTIONS:  - Assess pt frequently for physical needs  - Identify cognitive and physical deficits and behaviors that affect risk of falls.   - [place] fall precautions as indicated by assessment.  - Educate pt/family on patient safety including physical limitations  - Instruct pt to call for assistance with activity based on assessment  - Modify environment to reduce risk of injury  - Provide assistive devices as appropriate  - Consider OT/PT consult to assist with strengthening/mobility  - Encourage toileting schedule  Outcome: Progressing     Problem: RESPIRATORY - ADULT  Goal: Achieves optimal ventilation and oxygenation  Description: INTERVENTIONS:  - Assess for changes in respiratory status  - Assess for changes in mentation and behavior  - Position to facilitate oxygenation and minimize respiratory effort  - Oxygen supplementation based on oxygen saturation or ABGs  - Provide Smoking Cessation handout, if applicable  - Encourage broncho-pulmonary hygiene including cough, deep breathe, Incentive Spirometry  - Assess the need for suctioning and perform as needed  - Assess and instruct to report SOB or any respiratory difficulty  - Respiratory Therapy support as indicated  - Manage/alleviate anxiety  - Monitor for signs/symptoms of CO2 retention  Outcome: Not Progressing

## 2022-01-22 NOTE — PROGRESS NOTES
[facility] Pharmacy Note:  Renal Adjustment for piperacillin/tazobactam (Giuliana Gold)    Alf De La Torre is a 46year old patient who has been prescribed piperacillin/tazobactam (ZOSYN) 3.375 gm every 8 hrs. The estimated creatinine clearance is 19.6 mL/min (A) (based on SCr of 3.31 mg/dL (H)). The dose has been adjusted to piperacillin/tazobactam (ZOSYN) 3.375 gm every 12 hrs per hospital renal dose adjustment protocol for treatment of pneumonia. Pharmacy will follow and adjust dose as warranted for additional renal function changes.     Thank you,    Mitch Hebert, PharmD  1/22/2022  1:26 PM

## 2022-01-22 NOTE — PLAN OF CARE
Problem: SAFETY ADULT - FALL  Goal: Free from fall injury  Description: INTERVENTIONS:  - Assess pt frequently for physical needs  - Identify cognitive and physical deficits and behaviors that affect risk of falls.   - [place] fall precautions as indicated by assessment.  - Educate pt/family on patient safety including physical limitations  - Instruct pt to call for assistance with activity based on assessment  - Modify environment to reduce risk of injury  - Provide assistive devices as appropriate  - Consider OT/PT consult to assist with strengthening/mobility  - Encourage toileting schedule  Outcome: Progressing     Problem: Safety Risk - Non-Violent Restraints  Goal: Patient will remain free from self-harm  Description: INTERVENTIONS:  - Apply the least restrictive restraint to prevent harm  - Notify patient and family of reasons restraints applied  - Assess for any contributing factors to confusion (electrolyte disturbances, delirium, medications)  - Discontinue any unnecessary medical devices as soon as possible  - Assess the patient's physical comfort, circulation, skin condition, hydration, nutrition and elimination needs   - Reorient and redirection as needed  - Assess for the need to continue restraints  1/22/2022 1536 by Kelby Marc RN  Outcome: Completed  1/22/2022 1104 by Kelby Marc RN  Outcome: Progressing

## 2022-01-22 NOTE — RESPIRATORY THERAPY NOTE
Received patient intubated/mechanically ventilated on full support in prone position. FIO2 weaned to 90% pt tolerating well. Suction provided as needed. B/L BS heard.

## 2022-01-22 NOTE — PLAN OF CARE
Received pt from [facility], on vent, propofl, fentynal, precedex infusing. At 0900 to 0930, pt was placed supine from prone postion. Rn noticed picc site bleeding and blood over sheets and bed. Applied pressure and stopped heparin gtt. Upon supine, pt's 02 sat stayed above 88-91%, rr 30, other vitals remained stable. pt did not appear to be in distress. Left wrist restraints remain in place for safety. Post pressure dressing of picc, Rn sent ptt stat, site now with old blood. Will follow protocol based on ptt results. Problem: Patient Centered Care  Goal: Patient preferences are identified and integrated in the patient's plan of care  Description: Interventions:  - What would you like us to know as we care for you? My  is my point of contact.   - Provide timely, complete, and accurate information to patient/family  - Incorporate patient and family knowledge, values, beliefs, and cultural backgrounds into the planning and delivery of care  - Encourage patient/family to participate in care and decision-making at the level they choose  - Honor patient and family perspectives and choices  Outcome: Progressing     Problem: Patient/Family Goals  Goal: Patient/Family Long Term Goal  Description: Patient's Long Term Goal: to go home (assumed)    Interventions:  - - See additional Care Plan goals for specific interventions  Outcome: Progressing  Goal: Patient/Family Short Term Goal  Description: Patient's Short Term Goal: to breathe better    Interventions:   -   - See additional Care Plan goals for specific interventions  Outcome: Progressing     Problem: PAIN - ADULT  Goal: Verbalizes/displays adequate comfort level or patient's stated pain goal  Description: INTERVENTIONS:  - Encourage pt to monitor pain and request assistance  - Assess pain using appropriate pain scale  - Administer analgesics based on type and severity of pain and evaluate response  - Implement non-pharmacological measures as appropriate and evaluate response  - Consider cultural and social influences on pain and pain management  - Manage/alleviate anxiety  - Utilize distraction and/or relaxation techniques  - Monitor for opioid side effects  - Notify MD/LIP if interventions unsuccessful or patient reports new pain  - Anticipate increased pain with activity and pre-medicate as appropriate  Outcome: Progressing     Problem: RISK FOR INFECTION - ADULT  Goal: Absence of fever/infection during anticipated neutropenic period  Description: INTERVENTIONS  - Monitor WBC  - Administer growth factors as ordered  - Implement neutropenic guidelines  Outcome: Progressing     Problem: SAFETY ADULT - FALL  Goal: Free from fall injury  Description: INTERVENTIONS:  - Assess pt frequently for physical needs  - Identify cognitive and physical deficits and behaviors that affect risk of falls. - [place] fall precautions as indicated by assessment.  - Educate pt/family on patient safety including physical limitations  - Instruct pt to call for assistance with activity based on assessment  - Modify environment to reduce risk of injury  - Provide assistive devices as appropriate  - Consider OT/PT consult to assist with strengthening/mobility  - Encourage toileting schedule  Outcome: Progressing     Problem: CARDIOVASCULAR - ADULT  Goal: Maintains optimal cardiac output and hemodynamic stability  Description: INTERVENTIONS:  - Monitor vital signs, rhythm, and trends  - Monitor for bleeding, hypotension and signs of decreased cardiac output  - Evaluate effectiveness of vasoactive medications to optimize hemodynamic stability  - Monitor arterial and/or venous puncture sites for bleeding and/or hematoma  - Assess quality of pulses, skin color and temperature  - Assess for signs of decreased coronary artery perfusion - ex.  Angina  - Evaluate fluid balance, assess for edema, trend weights  Outcome: Progressing  Goal: Absence of cardiac arrhythmias or at baseline  Description: INTERVENTIONS:  - Continuous cardiac monitoring, monitor vital signs, obtain 12 lead EKG if indicated  - Evaluate effectiveness of antiarrhythmic and heart rate control medications as ordered  - Initiate emergency measures for life threatening arrhythmias  - Monitor electrolytes and administer replacement therapy as ordered  Outcome: Progressing          Problem: Safety Risk - Non-Violent Restraints  Goal: Patient will remain free from self-harm  Description: INTERVENTIONS:  - Apply the least restrictive restraint to prevent harm  - Notify patient and family of reasons restraints applied  - Assess for any contributing factors to confusion (electrolyte disturbances, delirium, medications)  - Discontinue any unnecessary medical devices as soon as possible  - Assess the patient's physical comfort, circulation, skin condition, hydration, nutrition and elimination needs   - Reorient and redirection as needed  - Assess for the need to continue restraints  Outcome: Progressing

## 2022-01-22 NOTE — PLAN OF CARE
Problem: RESPIRATORY - ADULT  Goal: Achieves optimal ventilation and oxygenation  Description: INTERVENTIONS:  - Assess for changes in respiratory status  - Assess for changes in mentation and behavior  - Position to facilitate oxygenation and minimize respiratory effort  - Oxygen supplementation based on oxygen saturation or ABGs  - Provide Smoking Cessation handout, if applicable  - Encourage broncho-pulmonary hygiene including cough, deep breathe, Incentive Spirometry  - Assess the need for suctioning and perform as needed  - Assess and instruct to report SOB or any respiratory difficulty  - Respiratory Therapy support as indicated  - Manage/alleviate anxiety  - Monitor for signs/symptoms of CO2 retention  1/21/2022 0506 by Hardeep Walton    -Patient remains intubated/mechanically ventilated on full support.  -Patient was supinated at 1005 this morning.  -1500 patient was prone due to low spo2, improvement in sats noted post.

## 2022-01-22 NOTE — CM/SW NOTE
Care Coordination [facility] Transfer Note:    [facility]: Per Thuy Okeefe they are on transfer hold and not accepting transfers. [facility]: Per Gama Her they are not accepting transfers at this time. [facility]: Per Chriss Murillo they are not accepting transfers at this time, they do not have any bed availability. [facility]: Per Cherelle Adkins, they are not accepting transfers at this time. Spoke to the pt's , Al, and updated on the above information.

## 2022-01-22 NOTE — PLAN OF CARE
Problem: Safety Risk - Non-Violent Restraints  Goal: Patient will remain free from self-harm  Description: INTERVENTIONS:  - Apply the least restrictive restraint to prevent harm  - Notify patient and family of reasons restraints applied  - Assess for any contributing factors to confusion (electrolyte disturbances, delirium, medications)  - Discontinue any unnecessary medical devices as soon as possible  - Assess the patient's physical comfort, circulation, skin condition, hydration, nutrition and elimination needs   - Reorient and redirection as needed  - Assess for the need to continue restraints  1/22/2022 0150 by Shyam Houston, DARA  Outcome: Progressing  1/21/2022 2323 by Shyam Houston, DARA  Outcome: Progressing     LOUISE soft wrist restraints still needed.

## 2022-01-22 NOTE — PLAN OF CARE
Problem: Patient Centered Care  Goal: Patient preferences are identified and integrated in the patient's plan of care  Description: Interventions:  - What would you like us to know as we care for you? My  is my point of contact. - Provide timely, complete, and accurate information to patient/family  - Incorporate patient and family knowledge, values, beliefs, and cultural backgrounds into the planning and delivery of care  - Encourage patient/family to participate in care and decision-making at the level they choose  - Honor patient and family perspectives and choices  Outcome: Progressing     Problem: PAIN - ADULT  Goal: Verbalizes/displays adequate comfort level or patient's stated pain goal  Description: INTERVENTIONS:  - Encourage pt to monitor pain and request assistance  - Assess pain using appropriate pain scale  - Administer analgesics based on type and severity of pain and evaluate response  - Implement non-pharmacological measures as appropriate and evaluate response  - Consider cultural and social influences on pain and pain management  - Manage/alleviate anxiety  - Utilize distraction and/or relaxation techniques  - Monitor for opioid side effects  - Notify MD/LIP if interventions unsuccessful or patient reports new pain  - Anticipate increased pain with activity and pre-medicate as appropriate  Outcome: Progressing     Problem: RISK FOR INFECTION - ADULT  Goal: Absence of fever/infection during anticipated neutropenic period  Description: INTERVENTIONS  - Monitor WBC  - Administer growth factors as ordered  - Implement neutropenic guidelines  Outcome: Progressing     Problem: SAFETY ADULT - FALL  Goal: Free from fall injury  Description: INTERVENTIONS:  - Assess pt frequently for physical needs  - Identify cognitive and physical deficits and behaviors that affect risk of falls.   - [place] fall precautions as indicated by assessment.  - Educate pt/family on patient safety including physical limitations  - Instruct pt to call for assistance with activity based on assessment  - Modify environment to reduce risk of injury  - Provide assistive devices as appropriate  - Consider OT/PT consult to assist with strengthening/mobility  - Encourage toileting schedule  Outcome: Progressing     Problem: DISCHARGE PLANNING  Goal: Discharge to home or other facility with appropriate resources  Description: INTERVENTIONS:  - Identify barriers to discharge w/pt and caregiver  - Include patient/family/discharge partner in discharge planning  - Arrange for needed discharge resources and transportation as appropriate  - Identify discharge learning needs (meds, wound care, etc)  - Arrange for interpreters to assist at discharge as needed  - Consider post-discharge preferences of patient/family/discharge partner  - Complete POLST form as appropriate  - Assess patient's ability to be responsible for managing their own health  - Refer to Case Management Department for coordinating discharge planning if the patient needs post-hospital services based on physician/LIP order or complex needs related to functional status, cognitive ability or social support system  Outcome: Progressing     Problem: RESPIRATORY - ADULT  Goal: Achieves optimal ventilation and oxygenation  Description: INTERVENTIONS:  - Assess for changes in respiratory status  - Assess for changes in mentation and behavior  - Position to facilitate oxygenation and minimize respiratory effort  - Oxygen supplementation based on oxygen saturation or ABGs  - Provide Smoking Cessation handout, if applicable  - Encourage broncho-pulmonary hygiene including cough, deep breathe, Incentive Spirometry  - Assess the need for suctioning and perform as needed  - Assess and instruct to report SOB or any respiratory difficulty  - Respiratory Therapy support as indicated  - Manage/alleviate anxiety  - Monitor for signs/symptoms of CO2 retention  Outcome: Progressing     Problem: CARDIOVASCULAR - ADULT  Goal: Maintains optimal cardiac output and hemodynamic stability  Description: INTERVENTIONS:  - Monitor vital signs, rhythm, and trends  - Monitor for bleeding, hypotension and signs of decreased cardiac output  - Evaluate effectiveness of vasoactive medications to optimize hemodynamic stability  - Monitor arterial and/or venous puncture sites for bleeding and/or hematoma  - Assess quality of pulses, skin color and temperature  - Assess for signs of decreased coronary artery perfusion - ex. Angina  - Evaluate fluid balance, assess for edema, trend weights  Outcome: Progressing  Goal: Absence of cardiac arrhythmias or at baseline  Description: INTERVENTIONS:  - Continuous cardiac monitoring, monitor vital signs, obtain 12 lead EKG if indicated  - Evaluate effectiveness of antiarrhythmic and heart rate control medications as ordered  - Initiate emergency measures for life threatening arrhythmias  - Monitor electrolytes and administer replacement therapy as ordered  Outcome: Progressing     Problem: Anxiety Related to PTSD  Goal: Long Term Goal  Outcome: Progressing  Goal: Patient Stated Goal  Description: Goal Description:     Objectives: Interventions:         Responsible professional:           Responsible professional:    Outcome: Progressing  Goal: Patient Goal  Description: Goal Description:     Objectives:      Interventions:         Responsible professional:           Responsible professional:          Responsible professional:   Outcome: Progressing     Problem: Safety Risk - Non-Violent Restraints  Goal: Patient will remain free from self-harm  Description: INTERVENTIONS:  - Apply the least restrictive restraint to prevent harm  - Notify patient and family of reasons restraints applied  - Assess for any contributing factors to confusion (electrolyte disturbances, delirium, medications)  - Discontinue any unnecessary medical devices as soon as possible  - Assess the patient's physical comfort, circulation, skin condition, hydration, nutrition and elimination needs   - Reorient and redirection as needed  - Assess for the need to continue restraints  Outcome: Progressing     Bed locked in lowest position, call light within reach, hourly and prn rounding cont, bed alarm on, LOUISE soft wrist restraints still needed, will cont to monitor resp and o2 status on vent w/sedation, prone position until 0830, all pt needs met.

## 2022-01-23 NOTE — PLAN OF CARE
Problem: RESPIRATORY - ADULT  Goal: Achieves optimal ventilation and oxygenation  Description: INTERVENTIONS:  - Assess for changes in respiratory status  - Assess for changes in mentation and behavior  - Position to facilitate oxygenation and minimize respiratory effort  - Oxygen supplementation based on oxygen saturation or ABGs  - Provide Smoking Cessation handout, if applicable  - Encourage broncho-pulmonary hygiene including cough, deep breathe, Incentive Spirometry  - Assess the need for suctioning and perform as needed  - Assess and instruct to report SOB or any respiratory difficulty  - Respiratory Therapy support as indicated  - Manage/alleviate anxiety  - Monitor for signs/symptoms of CO2 retention  Outcome: Progressing   Patient remains intubated and on full vent support on the following settings PRVC 32/500/+14/90%. Patient remains in prone position, Q4 head turns done. Fio2 weaned from 100% to 90% and pt maintaining saturations of low to mid 90s. No other weaning/ respiratory events overnight. RT will continue to monitor.

## 2022-01-23 NOTE — PLAN OF CARE
Problem: Patient Centered Care  Goal: Patient preferences are identified and integrated in the patient's plan of care  Description: Interventions:  - What would you like us to know as we care for you? My  is my point of contact. - Provide timely, complete, and accurate information to patient/family  - Incorporate patient and family knowledge, values, beliefs, and cultural backgrounds into the planning and delivery of care  - Encourage patient/family to participate in care and decision-making at the level they choose  - Honor patient and family perspectives and choices  Outcome: Progressing     Problem: PAIN - ADULT  Goal: Verbalizes/displays adequate comfort level or patient's stated pain goal  Description: INTERVENTIONS:  - Encourage pt to monitor pain and request assistance  - Assess pain using appropriate pain scale  - Administer analgesics based on type and severity of pain and evaluate response  - Implement non-pharmacological measures as appropriate and evaluate response  - Consider cultural and social influences on pain and pain management  - Manage/alleviate anxiety  - Utilize distraction and/or relaxation techniques  - Monitor for opioid side effects  - Notify MD/LIP if interventions unsuccessful or patient reports new pain  - Anticipate increased pain with activity and pre-medicate as appropriate  Outcome: Progressing     Problem: RISK FOR INFECTION - ADULT  Goal: Absence of fever/infection during anticipated neutropenic period  Description: INTERVENTIONS  - Monitor WBC  - Administer growth factors as ordered  - Implement neutropenic guidelines  Outcome: Progressing     Problem: SAFETY ADULT - FALL  Goal: Free from fall injury  Description: INTERVENTIONS:  - Assess pt frequently for physical needs  - Identify cognitive and physical deficits and behaviors that affect risk of falls.   - [place] fall precautions as indicated by assessment.  - Educate pt/family on patient safety including physical limitations  - Instruct pt to call for assistance with activity based on assessment  - Modify environment to reduce risk of injury  - Provide assistive devices as appropriate  - Consider OT/PT consult to assist with strengthening/mobility  - Encourage toileting schedule  Outcome: Progressing     Problem: DISCHARGE PLANNING  Goal: Discharge to home or other facility with appropriate resources  Description: INTERVENTIONS:  - Identify barriers to discharge w/pt and caregiver  - Include patient/family/discharge partner in discharge planning  - Arrange for needed discharge resources and transportation as appropriate  - Identify discharge learning needs (meds, wound care, etc)  - Arrange for interpreters to assist at discharge as needed  - Consider post-discharge preferences of patient/family/discharge partner  - Complete POLST form as appropriate  - Assess patient's ability to be responsible for managing their own health  - Refer to Case Management Department for coordinating discharge planning if the patient needs post-hospital services based on physician/LIP order or complex needs related to functional status, cognitive ability or social support system  Outcome: Progressing     Problem: RESPIRATORY - ADULT  Goal: Achieves optimal ventilation and oxygenation  Description: INTERVENTIONS:  - Assess for changes in respiratory status  - Assess for changes in mentation and behavior  - Position to facilitate oxygenation and minimize respiratory effort  - Oxygen supplementation based on oxygen saturation or ABGs  - Provide Smoking Cessation handout, if applicable  - Encourage broncho-pulmonary hygiene including cough, deep breathe, Incentive Spirometry  - Assess the need for suctioning and perform as needed  - Assess and instruct to report SOB or any respiratory difficulty  - Respiratory Therapy support as indicated  - Manage/alleviate anxiety  - Monitor for signs/symptoms of CO2 retention  Outcome: Progressing     Problem: CARDIOVASCULAR - ADULT  Goal: Maintains optimal cardiac output and hemodynamic stability  Description: INTERVENTIONS:  - Monitor vital signs, rhythm, and trends  - Monitor for bleeding, hypotension and signs of decreased cardiac output  - Evaluate effectiveness of vasoactive medications to optimize hemodynamic stability  - Monitor arterial and/or venous puncture sites for bleeding and/or hematoma  - Assess quality of pulses, skin color and temperature  - Assess for signs of decreased coronary artery perfusion - ex. Angina  - Evaluate fluid balance, assess for edema, trend weights  Outcome: Progressing  Goal: Absence of cardiac arrhythmias or at baseline  Description: INTERVENTIONS:  - Continuous cardiac monitoring, monitor vital signs, obtain 12 lead EKG if indicated  - Evaluate effectiveness of antiarrhythmic and heart rate control medications as ordered  - Initiate emergency measures for life threatening arrhythmias  - Monitor electrolytes and administer replacement therapy as ordered  Outcome: Progressing     Bed locked in lowest position, call light within reach, hourly and prn rounding cont, bed alarm on, will cont to monitor resp/o2 status and needs on vent, pt will cont to be prone position, all pt needs met.

## 2022-01-23 NOTE — PLAN OF CARE
Pt remains resting comfortably in bed. Pt remains intubated and sedated. On propofol, fentanyl and precedex. Cough/gag/corneals+. Remains on levo gtt. Pt flipped to supine position at 0830; tolerated well until 1600; proned again at 1600; tolerating well. Tube feeds per order. Flexi remains in place, increased output. Delcid in place, diminished output. Extremities cool to touch, blanco hugger in place. , Al, updated on plan of care. Bed is locked, in lowest position; call light remains within reach. Problem: Patient Centered Care  Goal: Patient preferences are identified and integrated in the patient's plan of care  Description: Interventions:  - What would you like us to know as we care for you? My  is my point of contact.   - Provide timely, complete, and accurate information to patient/family  - Incorporate patient and family knowledge, values, beliefs, and cultural backgrounds into the planning and delivery of care  - Encourage patient/family to participate in care and decision-making at the level they choose  - Honor patient and family perspectives and choices  Outcome: Progressing     Problem: Patient/Family Goals  Goal: Patient/Family Long Term Goal  Description: Patient's Long Term Goal: to go home (assumed)    Interventions:  - - See additional Care Plan goals for specific interventions  Outcome: Progressing  Goal: Patient/Family Short Term Goal  Description: Patient's Short Term Goal: to breathe better    Interventions:   -   - See additional Care Plan goals for specific interventions  Outcome: Progressing     Problem: PAIN - ADULT  Goal: Verbalizes/displays adequate comfort level or patient's stated pain goal  Description: INTERVENTIONS:  - Encourage pt to monitor pain and request assistance  - Assess pain using appropriate pain scale  - Administer analgesics based on type and severity of pain and evaluate response  - Implement non-pharmacological measures as appropriate and evaluate response  - Consider cultural and social influences on pain and pain management  - Manage/alleviate anxiety  - Utilize distraction and/or relaxation techniques  - Monitor for opioid side effects  - Notify MD/LIP if interventions unsuccessful or patient reports new pain  - Anticipate increased pain with activity and pre-medicate as appropriate  Outcome: Progressing     Problem: RISK FOR INFECTION - ADULT  Goal: Absence of fever/infection during anticipated neutropenic period  Description: INTERVENTIONS  - Monitor WBC  - Administer growth factors as ordered  - Implement neutropenic guidelines  Outcome: Progressing     Problem: SAFETY ADULT - FALL  Goal: Free from fall injury  Description: INTERVENTIONS:  - Assess pt frequently for physical needs  - Identify cognitive and physical deficits and behaviors that affect risk of falls.   - [place] fall precautions as indicated by assessment.  - Educate pt/family on patient safety including physical limitations  - Instruct pt to call for assistance with activity based on assessment  - Modify environment to reduce risk of injury  - Provide assistive devices as appropriate  - Consider OT/PT consult to assist with strengthening/mobility  - Encourage toileting schedule  Outcome: Progressing     Problem: DISCHARGE PLANNING  Goal: Discharge to home or other facility with appropriate resources  Description: INTERVENTIONS:  - Identify barriers to discharge w/pt and caregiver  - Include patient/family/discharge partner in discharge planning  - Arrange for needed discharge resources and transportation as appropriate  - Identify discharge learning needs (meds, wound care, etc)  - Arrange for interpreters to assist at discharge as needed  - Consider post-discharge preferences of patient/family/discharge partner  - Complete POLST form as appropriate  - Assess patient's ability to be responsible for managing their own health  - Refer to Case Management Department for coordinating discharge planning if the patient needs post-hospital services based on physician/LIP order or complex needs related to functional status, cognitive ability or social support system  Outcome: Progressing     Problem: RESPIRATORY - ADULT  Goal: Achieves optimal ventilation and oxygenation  Description: INTERVENTIONS:  - Assess for changes in respiratory status  - Assess for changes in mentation and behavior  - Position to facilitate oxygenation and minimize respiratory effort  - Oxygen supplementation based on oxygen saturation or ABGs  - Provide Smoking Cessation handout, if applicable  - Encourage broncho-pulmonary hygiene including cough, deep breathe, Incentive Spirometry  - Assess the need for suctioning and perform as needed  - Assess and instruct to report SOB or any respiratory difficulty  - Respiratory Therapy support as indicated  - Manage/alleviate anxiety  - Monitor for signs/symptoms of CO2 retention  Outcome: Progressing     Problem: CARDIOVASCULAR - ADULT  Goal: Maintains optimal cardiac output and hemodynamic stability  Description: INTERVENTIONS:  - Monitor vital signs, rhythm, and trends  - Monitor for bleeding, hypotension and signs of decreased cardiac output  - Evaluate effectiveness of vasoactive medications to optimize hemodynamic stability  - Monitor arterial and/or venous puncture sites for bleeding and/or hematoma  - Assess quality of pulses, skin color and temperature  - Assess for signs of decreased coronary artery perfusion - ex.  Angina  - Evaluate fluid balance, assess for edema, trend weights  Outcome: Progressing  Goal: Absence of cardiac arrhythmias or at baseline  Description: INTERVENTIONS:  - Continuous cardiac monitoring, monitor vital signs, obtain 12 lead EKG if indicated  - Evaluate effectiveness of antiarrhythmic and heart rate control medications as ordered  - Initiate emergency measures for life threatening arrhythmias  - Monitor electrolytes and administer replacement therapy as ordered  Outcome: Progressing     Problem: Anxiety Related to PTSD  Goal: Long Term Goal  Outcome: Progressing  Goal: Patient Stated Goal  Description: Goal Description:     Objectives: Interventions:         Responsible professional:           Responsible professional:    Outcome: Progressing  Goal: Patient Goal  Description: Goal Description:     Objectives:      Interventions:         Responsible professional:           Responsible professional:          Responsible professional:  Outcome: Progressing

## 2022-01-24 NOTE — IVS NOTE
Patient in IR for Temp dialysis catheter completed in patient room 204  Timeout/universal protocol completed. 10ml of 2% lidocaine given by Malik Styles MD to right neck. Patient monitored, vented, stat xray ordered. Patient tolerated procedure well.   Report given to [facility] and [facility]      Letitia Jason RN

## 2022-01-24 NOTE — PLAN OF CARE
Problem: RESPIRATORY - ADULT  Goal: Achieves optimal ventilation and oxygenation  Description: INTERVENTIONS:  - Assess for changes in respiratory status  - Assess for changes in mentation and behavior  - Position to facilitate oxygenation and minimize respiratory effort  - Oxygen supplementation based on oxygen saturation or ABGs  - Provide Smoking Cessation handout, if applicable  - Encourage broncho-pulmonary hygiene including cough, deep breathe, Incentive Spirometry  - Assess the need for suctioning and perform as needed  - Assess and instruct to report SOB or any respiratory difficulty  - Respiratory Therapy support as indicated  - Manage/alleviate anxiety  - Monitor for signs/symptoms of CO2 retention  Outcome: Not Progressing   Received pt in prone position on full vent support on the following settings PRVC 35/450/+15/100%. Patient did not tolerate supine position and desaturated, Nitric was  initiated and patient placed back in prone position. Saturating appropriately at this time. RT will continue to monitor.

## 2022-01-24 NOTE — PLAN OF CARE
Patient remains intubated, 100% FiO2, Nitric, O2Sats 93%. Sedated with fentanyl, precedex, and propofol drips, see EMAR. Nimbex initiated, see EMAR, titrated per order. Patient proned with Dr Francie Roger at bedside at [phone] as oxygen saturation decreased. Oxygen saturation improved after patient proned. Heart rate from 110s to 90s NSR after proning. Levophed drip titrated per order, see EMAR. CRRT initiated at 1516, patient tolerating well. Bed in lowest position, safety maintained. Patient's , Al, updated by phone. All questions answered. Problem: Patient Centered Care  Goal: Patient preferences are identified and integrated in the patient's plan of care  Description: Interventions:  - What would you like us to know as we care for you? My  is my point of contact.   - Provide timely, complete, and accurate information to patient/family  - Incorporate patient and family knowledge, values, beliefs, and cultural backgrounds into the planning and delivery of care  - Encourage patient/family to participate in care and decision-making at the level they choose  - Honor patient and family perspectives and choices  Outcome: Not Progressing     Problem: Patient/Family Goals  Goal: Patient/Family Long Term Goal  Description: Patient's Long Term Goal: to go home (assumed)    Interventions:  - - See additional Care Plan goals for specific interventions  Outcome: Not Progressing  Goal: Patient/Family Short Term Goal  Description: Patient's Short Term Goal: to breathe better    Interventions:   -   - See additional Care Plan goals for specific interventions  Outcome: Not Progressing     Problem: PAIN - ADULT  Goal: Verbalizes/displays adequate comfort level or patient's stated pain goal  Description: INTERVENTIONS:  - Encourage pt to monitor pain and request assistance  - Assess pain using appropriate pain scale  - Administer analgesics based on type and severity of pain and evaluate response  - Implement non-pharmacological measures as appropriate and evaluate response  - Consider cultural and social influences on pain and pain management  - Manage/alleviate anxiety  - Utilize distraction and/or relaxation techniques  - Monitor for opioid side effects  - Notify MD/LIP if interventions unsuccessful or patient reports new pain  - Anticipate increased pain with activity and pre-medicate as appropriate  Outcome: Progressing     Problem: SAFETY ADULT - FALL  Goal: Free from fall injury  Description: INTERVENTIONS:  - Assess pt frequently for physical needs  - Identify cognitive and physical deficits and behaviors that affect risk of falls.   - [place] fall precautions as indicated by assessment.  - Educate pt/family on patient safety including physical limitations  - Instruct pt to call for assistance with activity based on assessment  - Modify environment to reduce risk of injury  - Provide assistive devices as appropriate  - Consider OT/PT consult to assist with strengthening/mobility  - Encourage toileting schedule  Outcome: Progressing     Problem: RESPIRATORY - ADULT  Goal: Achieves optimal ventilation and oxygenation  Description: INTERVENTIONS:  - Assess for changes in respiratory status  - Assess for changes in mentation and behavior  - Position to facilitate oxygenation and minimize respiratory effort  - Oxygen supplementation based on oxygen saturation or ABGs  - Provide Smoking Cessation handout, if applicable  - Encourage broncho-pulmonary hygiene including cough, deep breathe, Incentive Spirometry  - Assess the need for suctioning and perform as needed  - Assess and instruct to report SOB or any respiratory difficulty  - Respiratory Therapy support as indicated  - Manage/alleviate anxiety  - Monitor for signs/symptoms of CO2 retention  Outcome: Not Progressing     Problem: CARDIOVASCULAR - ADULT  Goal: Maintains optimal cardiac output and hemodynamic stability  Description: INTERVENTIONS:  - Monitor vital signs, rhythm, and trends  - Monitor for bleeding, hypotension and signs of decreased cardiac output  - Evaluate effectiveness of vasoactive medications to optimize hemodynamic stability  - Monitor arterial and/or venous puncture sites for bleeding and/or hematoma  - Assess quality of pulses, skin color and temperature  - Assess for signs of decreased coronary artery perfusion - ex.  Angina  - Evaluate fluid balance, assess for edema, trend weights  Outcome: Not Progressing

## 2022-01-24 NOTE — CM/SW NOTE
Care Coordination [facility] Transfer Note:    Annamarie: They are still on transfer hold.

## 2022-01-24 NOTE — PLAN OF CARE
Problem: Patient Centered Care  Goal: Patient preferences are identified and integrated in the patient's plan of care  Description: Interventions:  - What would you like us to know as we care for you? My  is my point of contact. - Provide timely, complete, and accurate information to patient/family  - Incorporate patient and family knowledge, values, beliefs, and cultural backgrounds into the planning and delivery of care  - Encourage patient/family to participate in care and decision-making at the level they choose  - Honor patient and family perspectives and choices  1/23/2022 2307 by Patricia Carrasco RN  Outcome: Progressing  1/23/2022 2307 by Patricia Carrasco RN  Outcome: Progressing     Problem: PAIN - ADULT  Goal: Verbalizes/displays adequate comfort level or patient's stated pain goal  Description: INTERVENTIONS:  - Encourage pt to monitor pain and request assistance  - Assess pain using appropriate pain scale  - Administer analgesics based on type and severity of pain and evaluate response  - Implement non-pharmacological measures as appropriate and evaluate response  - Consider cultural and social influences on pain and pain management  - Manage/alleviate anxiety  - Utilize distraction and/or relaxation techniques  - Monitor for opioid side effects  - Notify MD/LIP if interventions unsuccessful or patient reports new pain  - Anticipate increased pain with activity and pre-medicate as appropriate  1/23/2022 2307 by Patricia Carrasco RN  Outcome: Progressing  1/23/2022 2307 by Patricia Carrasco RN  Outcome: Progressing     Problem: SAFETY ADULT - FALL  Goal: Free from fall injury  Description: INTERVENTIONS:  - Assess pt frequently for physical needs  - Identify cognitive and physical deficits and behaviors that affect risk of falls.   - [place] fall precautions as indicated by assessment.  - Educate pt/family on patient safety including physical limitations  - Instruct pt to call for assistance with activity based on assessment  - Modify environment to reduce risk of injury  - Provide assistive devices as appropriate  - Consider OT/PT consult to assist with strengthening/mobility  - Encourage toileting schedule  1/23/2022 2307 by Omid Hu RN  Outcome: Progressing  1/23/2022 2307 by Omid Hu RN  Outcome: Progressing     Problem: DISCHARGE PLANNING  Goal: Discharge to home or other facility with appropriate resources  Description: INTERVENTIONS:  - Identify barriers to discharge w/pt and caregiver  - Include patient/family/discharge partner in discharge planning  - Arrange for needed discharge resources and transportation as appropriate  - Identify discharge learning needs (meds, wound care, etc)  - Arrange for interpreters to assist at discharge as needed  - Consider post-discharge preferences of patient/family/discharge partner  - Complete POLST form as appropriate  - Assess patient's ability to be responsible for managing their own health  - Refer to Case Management Department for coordinating discharge planning if the patient needs post-hospital services based on physician/LIP order or complex needs related to functional status, cognitive ability or social support system  Outcome: Progressing     Problem: RESPIRATORY - ADULT  Goal: Achieves optimal ventilation and oxygenation  Description: INTERVENTIONS:  - Assess for changes in respiratory status  - Assess for changes in mentation and behavior  - Position to facilitate oxygenation and minimize respiratory effort  - Oxygen supplementation based on oxygen saturation or ABGs  - Provide Smoking Cessation handout, if applicable  - Encourage broncho-pulmonary hygiene including cough, deep breathe, Incentive Spirometry  - Assess the need for suctioning and perform as needed  - Assess and instruct to report SOB or any respiratory difficulty  - Respiratory Therapy support as indicated  - Manage/alleviate anxiety  - Monitor for signs/symptoms of CO2 retention  1/23/2022 2307 by Jeet Gu RN  Outcome: Progressing  1/23/2022 2307 by Jeet Gu RN  Outcome: Progressing     Problem: CARDIOVASCULAR - ADULT  Goal: Maintains optimal cardiac output and hemodynamic stability  Description: INTERVENTIONS:  - Monitor vital signs, rhythm, and trends  - Monitor for bleeding, hypotension and signs of decreased cardiac output  - Evaluate effectiveness of vasoactive medications to optimize hemodynamic stability  - Monitor arterial and/or venous puncture sites for bleeding and/or hematoma  - Assess quality of pulses, skin color and temperature  - Assess for signs of decreased coronary artery perfusion - ex. Angina  - Evaluate fluid balance, assess for edema, trend weights  1/23/2022 2307 by Jeet Gu RN  Outcome: Progressing  1/23/2022 2307 by Jeet Gu RN  Outcome: Progressing  Goal: Absence of cardiac arrhythmias or at baseline  Description: INTERVENTIONS:  - Continuous cardiac monitoring, monitor vital signs, obtain 12 lead EKG if indicated  - Evaluate effectiveness of antiarrhythmic and heart rate control medications as ordered  - Initiate emergency measures for life threatening arrhythmias  - Monitor electrolytes and administer replacement therapy as ordered  1/23/2022 2307 by Jeet Gu RN  Outcome: Progressing  1/23/2022 2307 by Jeet Gu RN  Outcome: Progressing     Bed locked in lowest position, call light within reach, hourly and prn rounding cont, bed alarm on, will cont to monitor resp/o2 status on vent, prone position, possible HD?, all pt needs met.

## 2022-01-24 NOTE — PROGRESS NOTES
[facility] Pharmacy Note:  Renal Dose Adjustment (CRRT)    Chriss Diaz has been prescribed piperacillin/tazobactam (ZOSYN)3.375g every 12 hours. Pharmacy has been asked to review medications for appropriateness for CRRT. Renal Replacement fluid rate is 3000 mL/hr. Based on this replacement rate, the CrCl is estimated to be 30 mL/min. Pharmacy will adjust the medications to the following doses:  piperacillin/tazobactam (ZOSYN) 3.375g every 8 hours. Pharmacy will also continue to assess the replacement rate and make any dose changes accordingly.     Thank you,  Miguel Dotson, PharmD  1/24/2022 10:59 AM

## 2022-01-24 NOTE — PLAN OF CARE
Problem: RESPIRATORY - ADULT  Goal: Achieves optimal ventilation and oxygenation  Description: INTERVENTIONS:  - Assess for changes in respiratory status  - Assess for changes in mentation and behavior  - Position to facilitate oxygenation and minimize respiratory effort  - Oxygen supplementation based on oxygen saturation or ABGs  - Provide Smoking Cessation handout, if applicable  - Encourage broncho-pulmonary hygiene including cough, deep breathe, Incentive Spirometry  - Assess the need for suctioning and perform as needed  - Assess and instruct to report SOB or any respiratory difficulty  - Respiratory Therapy support as indicated  - Manage/alleviate anxiety  - Monitor for signs/symptoms of CO2 retention  Outcome: Progressing Pt received intubated on full support PRVC 32/450/+15/100%. ETT 7.5 secure 27 @ lips. Bilateral BS auscultated. Suction provided when indicated. Pt remains in prone position throughout shift. No acute events on shift.

## 2022-01-25 NOTE — PROGRESS NOTES
Pt.received on following vent settings with ET tube 7.5 secured at 27 @ the lips. PT.weaned off Nitric and increase FIO2 90%, saturating well.  Pt.back on supine position at 17:10.     01/25/22 1315   Vent Information   Vent Mode PRVC/AC   Settings   FiO2 (%) 70 %   Resp Rate (Set) 35   Vt (Set, mL) 450 mL   PEEP/CPAP (cm H2O) 15 cm H20   Insp Time (sec) 0.75 sec   Humidification Heat and moisture exchanger   Readings   Total RR 36   Minute Ventilation (L/min) 14.2 L/min   Expiratory Tidal Volume 430 mL   PIP Observed (cm H2O) 43 cm H2O   MAP (cm H2O) 27   Plateau Pressure (cm H2O) 40 cm H2O   Static Compliance (L/cm H2O) 11   Dynamic Compliance (L/cm H2O) 13 L/cm H2O

## 2022-01-25 NOTE — PROGRESS NOTES
[facility] Dosing Service    Initial Pharmacokinetic Consult for Vancomycin Dosing     Alf De La Torre is a 46year old patient who is being treated for sepsis. Pharmacy has been asked to dose Vancomycin by MD Grissom    Weights:  Ideal body weight: 61.6 kg (135 lb 12.9 oz)  Adjusted ideal body weight: 75.6 kg (166 lb 9.3 oz)  Actual weight:  96.5 kg (212 lb 11.9 oz)    Dialysis Details:  CRRT @ 3L/hr    Based on the above:    1. This patient will receive a loading dose of Vancomycin  2000 mg IVPB (25mg/kg, capped at 2000 mg) x 1 dose. This will be followed by 1250 mg every 24hrs while on CRRT of 3L/hr    2. Pharmacy will order a vancomycin random level prior to the 3rd dialysis session. Goal pre-dialysis level is 15-20 mcg/mL which is likely to achieve the goal AUC24 of 400 to 600 mg-h/L.    3. Pharmacy will follow and monitor renal function, toxicity and efficacy. We appreciate the opportunity to assist in the care of this patient.     Sanchez Kingston, PharmD  1/25/2022  7:40 AM  [place]  Pharmacy Extension: [phone]

## 2022-01-25 NOTE — RESPIRATORY THERAPY NOTE
Patient received intubated and on ventilator support. PRVC/AC 35/450/15/90% with Nitric oxide at 40 ppm. Able to wean nitric to 10 ppm and FiO2 down to 70%. Patient tolerating prone position. Respiratory status stable and tolerating ventilator well. Sputum sample collected and sent to lab, suctioned as needed.

## 2022-01-25 NOTE — PLAN OF CARE
Problem: RESPIRATORY - ADULT  Goal: Achieves optimal ventilation and oxygenation  Description: INTERVENTIONS:  - Assess for changes in respiratory status  - Assess for changes in mentation and behavior  - Position to facilitate oxygenation and minimize respiratory effort  - Oxygen supplementation based on oxygen saturation or ABGs  - Provide Smoking Cessation handout, if applicable  - Encourage broncho-pulmonary hygiene including cough, deep breathe, Incentive Spirometry  - Assess the need for suctioning and perform as needed  - Assess and instruct to report SOB or any respiratory difficulty  - Respiratory Therapy support as indicated  - Manage/alleviate anxiety  - Monitor for signs/symptoms of CO2 retention  Outcome: Progressing Pt received intubated on full support PRVC 35/450/+15/100%. ETT 7.5/ secure 27 @ lips. Bilateral BS auscultated. Pt remains prone on shift.  Nitric in progress 40 ppm. No acute events on shit

## 2022-01-25 NOTE — PLAN OF CARE
Patient is paralyzed, sedated, and vented with settings below. Nimbex, Propofol, Fentanyl, Precedex, and Levophed infusing. Heparin gtt & tube feeding stopped due to bloody-tinged Donald MD Johnny notified. Flexiseal and olivares in place. CRRT ongoing. In prone position. Vent Mode: VC/AC  FiO2 (%):  [90 %-100 %] 90 %  S RR:  [32-35] 35  S VT:  [450 mL] 450 mL  PEEP/CPAP (cm H2O):  [15 cm H20] 15 cm H20  MAP (cm H2O):  [23-29] 29    Problem: CARDIOVASCULAR - ADULT  Goal: Maintains optimal cardiac output and hemodynamic stability  Description: INTERVENTIONS:  - Monitor vital signs, rhythm, and trends  - Monitor for bleeding, hypotension and signs of decreased cardiac output  - Evaluate effectiveness of vasoactive medications to optimize hemodynamic stability  - Monitor arterial and/or venous puncture sites for bleeding and/or hematoma  - Assess quality of pulses, skin color and temperature  - Assess for signs of decreased coronary artery perfusion - ex.  Angina  - Evaluate fluid balance, assess for edema, trend weights  Outcome: Progressing     Problem: RESPIRATORY - ADULT  Goal: Achieves optimal ventilation and oxygenation  Description: INTERVENTIONS:  - Assess for changes in respiratory status  - Assess for changes in mentation and behavior  - Position to facilitate oxygenation and minimize respiratory effort  - Oxygen supplementation based on oxygen saturation or ABGs  - Provide Smoking Cessation handout, if applicable  - Encourage broncho-pulmonary hygiene including cough, deep breathe, Incentive Spirometry  - Assess the need for suctioning and perform as needed  - Assess and instruct to report SOB or any respiratory difficulty  - Respiratory Therapy support as indicated  - Manage/alleviate anxiety  - Monitor for signs/symptoms of CO2 retention  Outcome: Not Progressing

## 2022-01-26 NOTE — CM/SW NOTE
Care Progression Note:  Active Acute Medical Issue:   Acute hypoxemic respiratory failure due to COVID-19 (HCC)  Pt remains on vent support, FiO2 100%, PEEP 15, proning, on pressors, CRRT    Other Contributing Medical Factors/Dx:  Hx kidney and pancreatic transplant    Length of stay: 22  GMLOS:   Avoidable Delays:   Discharge Barriers: Medically complex Critical Covid patient   Expected discharge date:    Expected discharge level of care: TBD. From home with family.  attempting to transfer to [facility], no beds available    / to remain available for support and/or discharge planning.      Cele Dorsey MBA MSN, RN CTL/  [id number]

## 2022-01-26 NOTE — PROCEDURES
[facility]  Brief IR Post-Procedure Note    Lesa De La Rosa Patient Status:  Inpatient    1970 MRN [id number]   Location [facility] 2W/SW Attending Hazeline Spurling,    Hosp Day # 25 PCP JACKIE Redd     Procedure(s): Left upper extremity doppler US    Pre-Procedure Diagnosis: 46year old female with COVID, now with Decreased radial pulse    Post-Procedure Diagnosis: same    Findings/Impression:  Limited grayscale and color doppler images of the left upper extremity were obtained due to patient's clinical status. Limited images of the brachial, radial and ulnar arteries demonstrate arterial flow at the from the elbow to the level of the wrist. Limited by soft tissue edema. Diminished waveform of radial artery at the level of the wrist, likely due to edema, ulnar waveform relatively maintained throughout its course. However no evidence of arterial occlusion of the brachial, radial or ulnar arteries.        Karena Mortimer, MD  2022

## 2022-01-26 NOTE — RESPIRATORY THERAPY NOTE
PT received on the vent with ETT size 7.5 @ 27   At  Lips,vent settings are PRVC/AC/ RR 35/ / PEEP 15/ FIO2 100%.  . Sx done and continue to monitor the PT

## 2022-01-26 NOTE — PROGRESS NOTES
Update:  Patient now in prone position with improved oxygen saturations, but worsening peak pressures and rising CO2. Suspect from poor ventilation and will check ABG now if able and change position if needed. Further discussion with infectious disease, hospitalist and nephrology. Will add mycamine in the event of secondary candidema. Will plan to continue tacrolimus for pancreas transplant as her present appearance is likely post-COVID inflammatory response.         Additional Critical Care Time: 35 minutes      Miracle Jean,   Pulmonary/Critical Care Medicine

## 2022-01-26 NOTE — PROGRESS NOTES
At 0100 left hand noted to be mottled with decreased cap refill. Unable to get radial pulse with doppler. Dr Neha Tejada notified, asked to have nocturnalist to see pt. Dr Bharathi Paz at bedside to evaluate pt and left hand status with bedside ultrasound machine. He discussed his findings with Dr Neha Tejada who ordered a stat arterial doppler of left arm to be done. Per ultrasound tech, Dr Bharathi Paz to discuss with IR MD on call to read study. Dr Bharathi Paz d/w Dr Abbey Medley (IR). ultrasound of left arm done and findings discussed with Dr Bharathi Paz overnight  and then with Dr Edward Mazariegos at bedside at [facility].

## 2022-01-26 NOTE — PLAN OF CARE
Problem: SAFETY ADULT - FALL  Goal: Free from fall injury  Description: INTERVENTIONS:  - Assess pt frequently for physical needs  - Identify cognitive and physical deficits and behaviors that affect risk of falls. - [place] fall precautions as indicated by assessment.  - Educate pt/family on patient safety including physical limitations  - Instruct pt to call for assistance with activity based on assessment  - Modify environment to reduce risk of injury  - Provide assistive devices as appropriate  - Consider OT/PT consult to assist with strengthening/mobility  - Encourage toileting schedule  Outcome: Progressing     Problem: CARDIOVASCULAR - ADULT  Goal: Maintains optimal cardiac output and hemodynamic stability  Description: INTERVENTIONS:  - Monitor vital signs, rhythm, and trends  - Monitor for bleeding, hypotension and signs of decreased cardiac output  - Evaluate effectiveness of vasoactive medications to optimize hemodynamic stability  - Monitor arterial and/or venous puncture sites for bleeding and/or hematoma  - Assess quality of pulses, skin color and temperature  - Assess for signs of decreased coronary artery perfusion - ex.  Angina  - Evaluate fluid balance, assess for edema, trend weights  Outcome: Progressing     Problem: Patient/Family Goals  Goal: Patient/Family Long Term Goal  Description: Patient's Long Term Goal: to go home (assumed)    Interventions:  - - See additional Care Plan goals for specific interventions  Outcome: Not Progressing  Goal: Patient/Family Short Term Goal  Description: Patient's Short Term Goal: to breathe better    Interventions:   -   - See additional Care Plan goals for specific interventions  Outcome: Not Progressing     Problem: DISCHARGE PLANNING  Goal: Discharge to home or other facility with appropriate resources  Description: INTERVENTIONS:  - Identify barriers to discharge w/pt and caregiver  - Include patient/family/discharge partner in discharge planning  - Arrange for needed discharge resources and transportation as appropriate  - Identify discharge learning needs (meds, wound care, etc)  - Arrange for interpreters to assist at discharge as needed  - Consider post-discharge preferences of patient/family/discharge partner  - Complete POLST form as appropriate  - Assess patient's ability to be responsible for managing their own health  - Refer to Case Management Department for coordinating discharge planning if the patient needs post-hospital services based on physician/LIP order or complex needs related to functional status, cognitive ability or social support system  Outcome: Not Progressing     Problem: RESPIRATORY - ADULT  Goal: Achieves optimal ventilation and oxygenation  Description: INTERVENTIONS:  - Assess for changes in respiratory status  - Assess for changes in mentation and behavior  - Position to facilitate oxygenation and minimize respiratory effort  - Oxygen supplementation based on oxygen saturation or ABGs  - Provide Smoking Cessation handout, if applicable  - Encourage broncho-pulmonary hygiene including cough, deep breathe, Incentive Spirometry  - Assess the need for suctioning and perform as needed  - Assess and instruct to report SOB or any respiratory difficulty  - Respiratory Therapy support as indicated  - Manage/alleviate anxiety  - Monitor for signs/symptoms of CO2 retention  Outcome: Not Progressing

## 2022-01-26 NOTE — PROGRESS NOTES
Called to patient's room for color change to the left hand. Left hand slightly cooler and pale compared to the right. Severe edema to both upper extremities. Cap refill in the left fingertips 3 to 4 seconds. No doppler radial pulse. Strong doppler brachial pulse at the left elbow. Somewhat weak doppler ulnar pulse 3 to 4 cm proximal to the left wrist.  Discussed with Dr. Brian Chapa. Will obtain arterial ultrasound of the left upper extremity. Vascular consult if needed.

## 2022-01-27 NOTE — CM/SW NOTE
Called [facility] transfer [place] at [phone] and spoke with Hilda Caceres RN regarding transfer of patient. Hilda Caceres RN stated that [facility] do not have any beds available at this time. CCU RN updated    Transfer center to try again tomorrow 1/28/22.

## 2022-01-27 NOTE — PROGRESS NOTES
No vent changes made, pt is now in supine position. Pt suctioned as needed. RT will continue to monitor pt.     01/27/22 0320   Vent Information   $ RT Standby Charge (per 15 min) 1  (vent check, pt is being supine upon my arrival)   $ Vent Care / Non-Invasive Subsequent Day Yes   Ventilator Initiation 01/21/22   Interface Invasive   Vent Type AV   Vent ID [id number]   Vent Mode PRVC/AC   Settings   FiO2 (%) 100 %   Resp Rate (Set) 34   Vt (Set, mL) 450 mL   Waveform Decelerating ramp   PEEP/CPAP (cm H2O) 15 cm H20   Bias Flow 2   Insp Time (sec) 0.75 sec   Trigger Sensitivity Flow (L/min) 3 L/min   Humidification Heat and moisture exchanger   Readings   ETCO2 (mmHg) 57 mmHg   Total RR 35   Minute Ventilation (L/min) 14.6 L/min   Expiratory Tidal Volume 430 mL   PIP Observed (cm H2O) 41 cm H2O   MAP (cm H2O) 24   I/E Ratio 1:1.4   Alarms   High RR 50   Insp Pressure High (cm H2O) 60 cm H2O   Insp Pressure Low (cm H2O) 10 cm H2O   MV High (L/min) 30 L/min   MV Low (L/min) 3 L/min   Apnea Interval (sec) 20 seconds   ETT   Placement Date/Time: 01/13/22 4300   Airway Size: 7.5 mm   Secured at (cm) 26 cm   Suctioned?  N   Measured From Lips   Secured Location Right   Secured by Commercial tube camejo   Req'd equipment at bedside Bag mask

## 2022-01-28 NOTE — PROGRESS NOTES
Received PT intubated with a (7.5) ETT secured at (26) on vent with the following settings; BS heard bilaterally, SXN provided as needed. Pt on Nitric Oxide 20ppm. No changes made, RT to continue to monitor.         01/28/22 0407   Vent Information   Vent Mode PRVC/AC   Settings   FiO2 (%) 100 %   Resp Rate (Set) 34   Vt (Set, mL) 450 mL   Waveform Decelerating ramp   PEEP/CPAP (cm H2O) 15 cm H20   PEEP Low (cm H2O) 8 cm H2O   Insp Time (sec) 0.75 sec   Trigger Sensitivity Flow (L/min) 3 L/min   Humidification Heat and moisture exchanger   Readings   Total RR 34   Minute Ventilation (L/min) 14.4 L/min   Expiratory Tidal Volume 371 mL   PIP Observed (cm H2O) 35 cm H2O   MAP (cm H2O) 24   PEEP Low (cm H2O) 8 cm H2O   Plateau Pressure (cm H2O) 38 cm H2O   Static Compliance (L/cm H2O) 18   Dynamic Compliance (L/cm H2O) 21 L/cm H2O

## 2022-01-28 NOTE — PLAN OF CARE
Problem: RESPIRATORY - ADULT  Goal: Achieves optimal ventilation and oxygenation  Description: INTERVENTIONS:  - Assess for changes in respiratory status  - Assess for changes in mentation and behavior  - Position to facilitate oxygenation and minimize respiratory effort  - Oxygen supplementation based on oxygen saturation or ABGs  - Provide Smoking Cessation handout, if applicable  - Encourage broncho-pulmonary hygiene including cough, deep breathe, Incentive Spirometry  - Assess the need for suctioning and perform as needed  - Assess and instruct to report SOB or any respiratory difficulty  - Respiratory Therapy support as indicated  - Manage/alleviate anxiety  - Monitor for signs/symptoms of CO2 retention  Outcome: Not Progressing   Pt received intubated PRVC/A/C 34/450/100%+15. Pt was desaturated,Nitric increased to 20ppm,now saturating 89%. Pt suctioned as needed.

## 2022-01-28 NOTE — PROGRESS NOTES
[facility] Dosing Service    Follow-up Pharmacokinetic Consult for Vancomycin Dosing     Donnie Ernst is a 46year old patient who is being treated for sepsis. Patient is on day 4 of vancomycin and is currently receiving 1250 mg Q 24 hours. Labs:  Lab Results   Component Value Date    CREATSERUM 0.90 01/28/2022    CREATSERUM 0.94 01/28/2022    CREATSERUM 0.95 01/27/2022      CrCl:  Estimated Creatinine Clearance: 71.9 mL/min (based on SCr of 0.9 mg/dL). Levels:  trough: 13.4 mcg/mL    Based on the above:    1. Continue Vancomycin at 1250 mg IVPB Q 24 hours while on CRRT at 3L/hr. 2.  Will re-check vancomycin trough level(s) in 5-7 days or sooner if changes in CRRT rate. Goal trough is 10-15 mcg/mL unless otherwise noted by ordering provider. 3.  Pharmacy will order SCr as clinically indicated while on vancomycin to assess renal function. 4. Pharmacy will follow and monitor renal function, toxicity and efficacy. We appreciate the opportunity to assist in the care of this patient.     Aristeo Euceda, PharmD  1/28/2022  [address] [place] [address] Extension: [phone]

## 2022-01-28 NOTE — PLAN OF CARE
Problem: Patient Centered Care  Goal: Patient preferences are identified and integrated in the patient's plan of care  Description: Interventions:  - What would you like us to know as we care for you? My  is my point of contact.   - Provide timely, complete, and accurate information to patient/family  - Incorporate patient and family knowledge, values, beliefs, and cultural backgrounds into the planning and delivery of care  - Encourage patient/family to participate in care and decision-making at the level they choose  - Honor patient and family perspectives and choices  Outcome: Progressing     Problem: Patient/Family Goals  Goal: Patient/Family Long Term Goal  Description: Patient's Long Term Goal: to go home (assumed)    Interventions:  - - See additional Care Plan goals for specific interventions  Outcome: Not Progressing  Goal: Patient/Family Short Term Goal  Description: Patient's Short Term Goal: to breathe better    Interventions:   -   - See additional Care Plan goals for specific interventions  Outcome: Not Progressing     Problem: PAIN - ADULT  Goal: Verbalizes/displays adequate comfort level or patient's stated pain goal  Description: INTERVENTIONS:  - Encourage pt to monitor pain and request assistance  - Assess pain using appropriate pain scale  - Administer analgesics based on type and severity of pain and evaluate response  - Implement non-pharmacological measures as appropriate and evaluate response  - Consider cultural and social influences on pain and pain management  - Manage/alleviate anxiety  - Utilize distraction and/or relaxation techniques  - Monitor for opioid side effects  - Notify MD/LIP if interventions unsuccessful or patient reports new pain  - Anticipate increased pain with activity and pre-medicate as appropriate  Outcome: Progressing     Problem: SAFETY ADULT - FALL  Goal: Free from fall injury  Description: INTERVENTIONS:  - Assess pt frequently for physical needs  - Identify cognitive and physical deficits and behaviors that affect risk of falls.   - Susan fall precautions as indicated by assessment.  - Educate pt/family on patient safety including physical limitations  - Instruct pt to call for assistance with activity based on assessment  - Modify environment to reduce risk of injury  - Provide assistive devices as appropriate  - Consider OT/PT consult to assist with strengthening/mobility  - Encourage toileting schedule  Outcome: Progressing     Problem: DISCHARGE PLANNING  Goal: Discharge to home or other facility with appropriate resources  Description: INTERVENTIONS:  - Identify barriers to discharge w/pt and caregiver  - Include patient/family/discharge partner in discharge planning  - Arrange for needed discharge resources and transportation as appropriate  - Identify discharge learning needs (meds, wound care, etc)  - Arrange for interpreters to assist at discharge as needed  - Consider post-discharge preferences of patient/family/discharge partner  - Complete POLST form as appropriate  - Assess patient's ability to be responsible for managing their own health  - Refer to Case Management Department for coordinating discharge planning if the patient needs post-hospital services based on physician/LIP order or complex needs related to functional status, cognitive ability or social support system  Outcome: Not Progressing     Problem: RESPIRATORY - ADULT  Goal: Achieves optimal ventilation and oxygenation  Description: INTERVENTIONS:  - Assess for changes in respiratory status  - Assess for changes in mentation and behavior  - Position to facilitate oxygenation and minimize respiratory effort  - Oxygen supplementation based on oxygen saturation or ABGs  - Provide Smoking Cessation handout, if applicable  - Encourage broncho-pulmonary hygiene including cough, deep breathe, Incentive Spirometry  - Assess the need for suctioning and perform as needed  - Assess and instruct to report SOB or any respiratory difficulty  - Respiratory Therapy support as indicated  - Manage/alleviate anxiety  - Monitor for signs/symptoms of CO2 retention  Outcome: Not Progressing     Problem: CARDIOVASCULAR - ADULT  Goal: Maintains optimal cardiac output and hemodynamic stability  Description: INTERVENTIONS:  - Monitor vital signs, rhythm, and trends  - Monitor for bleeding, hypotension and signs of decreased cardiac output  - Evaluate effectiveness of vasoactive medications to optimize hemodynamic stability  - Monitor arterial and/or venous puncture sites for bleeding and/or hematoma  - Assess quality of pulses, skin color and temperature  - Assess for signs of decreased coronary artery perfusion - ex. Angina  - Evaluate fluid balance, assess for edema, trend weights  Outcome: Progressing     Per pulmonary, do not wean oxygen or nitric today. Otherwise pressor support weaned down. CRRT fluid removal goal increased and pt tolerating at this time.  and daughter updated at bedside. Pt's parents and sister received special permission for one time visit today. No proning today per Dr Alex Marquez. Phosphorous replaced per nephrology.

## 2022-01-29 NOTE — PLAN OF CARE
Problem: RESPIRATORY - ADULT  Goal: Achieves optimal ventilation and oxygenation  Description: INTERVENTIONS:  - Assess for changes in respiratory status  - Assess for changes in mentation and behavior  - Position to facilitate oxygenation and minimize respiratory effort  - Oxygen supplementation based on oxygen saturation or ABGs  - Provide Smoking Cessation handout, if applicable  - Encourage broncho-pulmonary hygiene including cough, deep breathe, Incentive Spirometry  - Assess the need for suctioning and perform as needed  - Assess and instruct to report SOB or any respiratory difficulty  - Respiratory Therapy support as indicated  - Manage/alleviate anxiety  - Monitor for signs/symptoms of CO2 retention  Outcome: Progressing   Patient remains on full vent support and on 20PPM nitric oxide. No weaning/ respiratory events on day shift. RT will continue to monitor.      01/28/22    Vent Information   Vent Mode PRVC/AC   Settings   FiO2 (%) 100 %   Resp Rate (Set) 34   Vt (Set, mL) 450 mL   PEEP/CPAP (cm H2O) 15 cm H20

## 2022-01-29 NOTE — CM/SW NOTE
[facility] at [phone] and spoke with Malinda Cason RN regarding possible transfer. No beds at this time. Called [facility] at [phone] and spoke with Trudell Saint RN - No beds available.

## 2022-01-30 NOTE — PLAN OF CARE
Problem: RESPIRATORY - ADULT  Goal: Achieves optimal ventilation and oxygenation  Description: INTERVENTIONS:  - Assess for changes in respiratory status  - Assess for changes in mentation and behavior  - Position to facilitate oxygenation and minimize respiratory effort  - Oxygen supplementation based on oxygen saturation or ABGs  - Provide Smoking Cessation handout, if applicable  - Encourage broncho-pulmonary hygiene including cough, deep breathe, Incentive Spirometry  - Assess the need for suctioning and perform as needed  - Assess and instruct to report SOB or any respiratory difficulty  - Respiratory Therapy support as indicated  - Manage/alleviate anxiety  - Monitor for signs/symptoms of CO2 retention  Outcome: Progressing     Pt remains intubated with ETT size 7.5 @ 25 cm at the lip, on full support with the following vent settings: PRVC/AC 34/450/+15/90% and 20 ppm nitric oxide. Pt saturating appropriately, FiO2 wean down to 85%. Suction provided as needed, bilaterally diminished BS, RT will continue to monitor.

## 2022-01-30 NOTE — PROGRESS NOTES
01/30/22 0500   Vent Information   Vent Mode PRVC/AC   Settings   FiO2 (%) 80 %   Resp Rate (Set) 34   Vt (Set, mL) 450 mL   Waveform Decelerating ramp   PEEP/CPAP (cm H2O) 15 cm H20       Received patient intubated/mechanically ventilated on full support. Will continue to wean FIO2 as tolerated. Nitric running at 20PPM. RT will continue to monitor.

## 2022-01-30 NOTE — RESPIRATORY THERAPY NOTE
Patient remains on full vent support with the following settings PRVC/34/450/75%/+15. Patient is on Nitric Oxide 20 ppm. Patient saturating above 90%.

## 2022-01-31 NOTE — CM/SW NOTE
[facility] at [phone] and spoke with Kim Gandara RN regarding possible transfer. No beds at this time, only taking pediatric transfers  [facility] at [phone] and spoke with [facility] - No ICU beds available.   [address] [place], [address] [facility] , [address] [place] [address]  Clinical Transitions Leader  [phone]

## 2022-01-31 NOTE — PROGRESS NOTES
Received PT intubated with a (7.5) ETT secured at (25) on vent with the following settings; BS heard bilaterally, SXN provided as needed. Titrated FiO2 to 75%. Pt tolerating well. RT to continue to monitor.         01/31/22 0430   Vent Information   Vent Mode PRVC/AC   Settings   FiO2 (%) 80 %   Resp Rate (Set) 34   Vt (Set, mL) 450 mL   Waveform Decelerating ramp   PEEP/CPAP (cm H2O) 15 cm H20   PEEP Low (cm H2O) 8 cm H2O   Insp Time (sec) 0.75 sec   Trigger Sensitivity Flow (L/min) 3 L/min   Humidification Heat and moisture exchanger   Readings   Total RR 35   Minute Ventilation (L/min) 14.7 L/min   Expiratory Tidal Volume 470 mL   PIP Observed (cm H2O) 37 cm H2O   MAP (cm H2O) 24   PEEP Low (cm H2O) 8 cm H2O   Plateau Pressure (cm H2O) 36 cm H2O   Static Compliance (L/cm H2O) 13   Dynamic Compliance (L/cm H2O) 16 L/cm H2O

## 2022-02-01 NOTE — PROGRESS NOTES
Patient received on vent settings noted,pt is on NO at 20 ppm, pt suctioned as needed, no vent changes made overnight, RT will continue to monitor pt.         02/01/22 9410   Vent Information   $ RT Standby Charge (per 15 min) 1  (vent/NO check)   $ Vent Care / Non-Invasive Subsequent Day Yes   Ventilator Initiation 01/13/22   Interface Invasive   Vent Type AV   Vent ID [id number]   Vent Mode PRVC/AC   Settings   FiO2 (%) 80 %   Resp Rate (Set) 34   Vt (Set, mL) 450 mL   Waveform Decelerating ramp   PEEP/CPAP (cm H2O) 15 cm H20   PEEP Low (cm H2O) 8 cm H2O   Bias Flow 3   Insp Time (sec) 0.75 sec   Trigger Sensitivity Flow (L/min) 3 L/min   Trigger Sensitivity Pressure (cm H2O) 3 cm H2O   Humidification Heat and moisture exchanger   Readings   ETCO2 (mmHg) 48 mmHg   Total RR 35   Minute Ventilation (L/min) 14.3 L/min   Expiratory Tidal Volume 440 mL   PIP Observed (cm H2O) 41 cm H2O   MAP (cm H2O) 25   Alarms   High RR 50   Insp Pressure High (cm H2O) 60 cm H2O   Insp Pressure Low (cm H2O) 10 cm H2O   MV High (L/min) 30 L/min   MV Low (L/min) 3 L/min   Apnea Interval (sec) 20 seconds   ETT   Placement Date/Time: 01/13/22 4022   Airway Size: 7.5 mm   Secured at (cm) 25 cm   Suctioned?  Y   Measured From Lips   Secured Location Left   Secured by Commercial tube camejo   Req'd equipment at bedside Bag mask

## 2022-02-01 NOTE — PLAN OF CARE
Problem: RESPIRATORY - ADULT  Goal: Achieves optimal ventilation and oxygenation  Description: INTERVENTIONS:  - Assess for changes in respiratory status  - Assess for changes in mentation and behavior  - Position to facilitate oxygenation and minimize respiratory effort  - Oxygen supplementation based on oxygen saturation or ABGs  - Provide Smoking Cessation handout, if applicable  - Encourage broncho-pulmonary hygiene including cough, deep breathe, Incentive Spirometry  - Assess the need for suctioning and perform as needed  - Assess and instruct to report SOB or any respiratory difficulty  - Respiratory Therapy support as indicated  - Manage/alleviate anxiety  - Monitor for signs/symptoms of CO2 retention  Outcome: Not Progressing

## 2022-02-01 NOTE — PLAN OF CARE
Patient is lightly sedated and vented with settings below, weak gag & cough reflex. Propofol, Vasopressin, and Heparin infusing - Levophed restarted at 0.5 mcg for hypotension; blood pressure is very labile. No olivares output. Flexiseal in place. Orogastric tube to tube feeding. Minimal amount of secretions suctioned inline and orally. CRRT ongoing. Vent Mode: PRVC/AC  FiO2 (%):  [75 %-80 %] 80 %  S RR:  [34] 34  S VT:  [450 mL] 450 mL  PEEP/CPAP (cm H2O):  [15 cm H20] 15 cm H20  MAP (cm H2O):  [23-25] 25      Problem: RESPIRATORY - ADULT  Goal: Achieves optimal ventilation and oxygenation  Description: INTERVENTIONS:  - Assess for changes in respiratory status  - Assess for changes in mentation and behavior  - Position to facilitate oxygenation and minimize respiratory effort  - Oxygen supplementation based on oxygen saturation or ABGs  - Provide Smoking Cessation handout, if applicable  - Encourage broncho-pulmonary hygiene including cough, deep breathe, Incentive Spirometry  - Assess the need for suctioning and perform as needed  - Assess and instruct to report SOB or any respiratory difficulty  - Respiratory Therapy support as indicated  - Manage/alleviate anxiety  - Monitor for signs/symptoms of CO2 retention  Outcome: Not Progressing     Problem: CARDIOVASCULAR - ADULT  Goal: Maintains optimal cardiac output and hemodynamic stability  Description: INTERVENTIONS:  - Monitor vital signs, rhythm, and trends  - Monitor for bleeding, hypotension and signs of decreased cardiac output  - Evaluate effectiveness of vasoactive medications to optimize hemodynamic stability  - Monitor arterial and/or venous puncture sites for bleeding and/or hematoma  - Assess quality of pulses, skin color and temperature  - Assess for signs of decreased coronary artery perfusion - ex.  Angina  - Evaluate fluid balance, assess for edema, trend weights  Outcome: Not Progressing     Problem: SKIN/TISSUE INTEGRITY - ADULT  Goal: Skin integrity remains intact  Description: INTERVENTIONS  - Assess and document risk factors for pressure ulcer development  - Assess and document skin integrity  - Monitor for areas of redness and/or skin breakdown  - Initiate interventions, skin care algorithm/standards of care as needed  Outcome: Not Progressing

## 2022-02-01 NOTE — CM/SW NOTE
Care Progression Note:  Active Acute Medical Issue:   Acute hypoxemic respiratory failure due to COVID-19 (HCC) Severe Covid 19 pneumonia, ARDS- remains on vent support, FiO2 weaned to 70 %, ID, Pulmonology following  Shock/sepsis- now off levo, on vaso  AMARA-Nephrology following- CRRT  DVT, UTI    Other Contributing Medical Factors/Dx:  Hx pancreatic/renal transplant  DM    Length of stay: 28  GMLOS: 11.9  Avoidable Delays:   Discharge Barriers: Await clinical improvement, Awaiting transfer to [facility]- no beds available    Expected discharge date:    Expected discharge level of care: TBD. From home with family    / to remain available for support and/or discharge planning.      Marika Mckee MBA MSN, RN CTL/  [id number]

## 2022-02-02 NOTE — PLAN OF CARE
Problem: RESPIRATORY - ADULT  Goal: Achieves optimal ventilation and oxygenation  Description: INTERVENTIONS:  - Assess for changes in respiratory status  - Assess for changes in mentation and behavior  - Position to facilitate oxygenation and minimize respiratory effort  - Oxygen supplementation based on oxygen saturation or ABGs  - Provide Smoking Cessation handout, if applicable  - Encourage broncho-pulmonary hygiene including cough, deep breathe, Incentive Spirometry  - Assess the need for suctioning and perform as needed  - Assess and instruct to report SOB or any respiratory difficulty  - Respiratory Therapy support as indicated  - Manage/alleviate anxiety  - Monitor for signs/symptoms of CO2 retention  Outcome: Progressing  Patient does not meet criteria for weaning at this time and remains on full ventilator support. Inspiratory time decreased per patient auto peep on ventilator to 0.6 seconds with much improvement. Patients current settings remain at [facility], [facility] 34 450 +15 65%.

## 2022-02-02 NOTE — CM/SW NOTE
Update call to transfer center at [facility], [address] spoke w/[facility]. Per [facility] transfers on hold, however did request facesheet and understood pt has hx of kidney/pancreas transplant there. Dx: Now acute hypoxic respiratory failure d/t covid (+), on vent/intubated/sedated, immunocompromised. Face sheet with clinical faxed to [facility] for higher level of care as initiated by Dr. Aaron Benitez. This writer provided Dr. Aaron Benitez contact cell phone# to [facility] as requested. CM to remain available for support and/or discharge planning.     Alessandro Rasheed RN, [facility]  ER   Ext. [phone]

## 2022-02-03 NOTE — CM/SW NOTE
Per Luis monteiro at [facility] they have no beds available at this time. They are only reviewing their own LVAD patients. Per [facility] they are not accepting any covid patients and they have no ICU beds.

## 2022-02-03 NOTE — CM/SW NOTE
FMLA/Insurance document  Documents completed by MD per  request.  Returned documents to     / to remain available for support and/or discharge planning.      Triny ODENA MSN, RN CTL/  [id number]

## 2022-02-03 NOTE — PROGRESS NOTES
02/03/22 0415   Vent Information   Vent Mode PRVC/AC   Settings   FiO2 (%) 65 %   Resp Rate (Set) 34   Vt (Set, mL) 450 mL   PEEP/CPAP (cm H2O) 15 cm H20     Pt remains intubated/mechanically ventilated on full support with NO running at 20PPM. Suction provided as needed.  No changes to vent at this time

## 2022-02-04 NOTE — PLAN OF CARE
Patient vented. Vent and Nitric weaned as tolerated. TF increased to goal of 45. Levo weaned as tolerated. CRRT changed to net goal of zero. Family bedside and updated. Problem: PAIN - ADULT  Goal: Verbalizes/displays adequate comfort level or patient's stated pain goal  Description: INTERVENTIONS:  - Encourage pt to monitor pain and request assistance  - Assess pain using appropriate pain scale  - Administer analgesics based on type and severity of pain and evaluate response  - Implement non-pharmacological measures as appropriate and evaluate response  - Consider cultural and social influences on pain and pain management  - Manage/alleviate anxiety  - Utilize distraction and/or relaxation techniques  - Monitor for opioid side effects  - Notify MD/LIP if interventions unsuccessful or patient reports new pain  - Anticipate increased pain with activity and pre-medicate as appropriate  Outcome: Progressing     Problem: RISK FOR INFECTION - ADULT  Goal: Absence of fever/infection during anticipated neutropenic period  Description: INTERVENTIONS  - Monitor WBC  - Administer growth factors as ordered  - Implement neutropenic guidelines  Outcome: Progressing     Problem: SAFETY ADULT - FALL  Goal: Free from fall injury  Description: INTERVENTIONS:  - Assess pt frequently for physical needs  - Identify cognitive and physical deficits and behaviors that affect risk of falls.   - [place] fall precautions as indicated by assessment.  - Educate pt/family on patient safety including physical limitations  - Instruct pt to call for assistance with activity based on assessment  - Modify environment to reduce risk of injury  - Provide assistive devices as appropriate  - Consider OT/PT consult to assist with strengthening/mobility  - Encourage toileting schedule  Outcome: Progressing     Problem: CARDIOVASCULAR - ADULT  Goal: Absence of cardiac arrhythmias or at baseline  Description: INTERVENTIONS:  - Continuous cardiac monitoring, monitor vital signs, obtain 12 lead EKG if indicated  - Evaluate effectiveness of antiarrhythmic and heart rate control medications as ordered  - Initiate emergency measures for life threatening arrhythmias  - Monitor electrolytes and administer replacement therapy as ordered  Outcome: Progressing     Problem: Diabetes/Glucose Control  Goal: Glucose maintained within prescribed range  Description: INTERVENTIONS:  - Monitor Blood Glucose as ordered  - Assess for signs and symptoms of hyperglycemia and hypoglycemia  - Administer ordered medications to maintain glucose within target range  - Assess barriers to adequate nutritional intake and initiate nutrition consult as needed  - Instruct patient on self management of diabetes  Outcome: Progressing     Problem: RESPIRATORY - ADULT  Goal: Achieves optimal ventilation and oxygenation  Description: INTERVENTIONS:  - Assess for changes in respiratory status  - Assess for changes in mentation and behavior  - Position to facilitate oxygenation and minimize respiratory effort  - Oxygen supplementation based on oxygen saturation or ABGs  - Provide Smoking Cessation handout, if applicable  - Encourage broncho-pulmonary hygiene including cough, deep breathe, Incentive Spirometry  - Assess the need for suctioning and perform as needed  - Assess and instruct to report SOB or any respiratory difficulty  - Respiratory Therapy support as indicated  - Manage/alleviate anxiety  - Monitor for signs/symptoms of CO2 retention  Outcome: Not Progressing     Problem: CARDIOVASCULAR - ADULT  Goal: Maintains optimal cardiac output and hemodynamic stability  Description: INTERVENTIONS:  - Monitor vital signs, rhythm, and trends  - Monitor for bleeding, hypotension and signs of decreased cardiac output  - Evaluate effectiveness of vasoactive medications to optimize hemodynamic stability  - Monitor arterial and/or venous puncture sites for bleeding and/or hematoma  - Assess quality of pulses, skin color and temperature  - Assess for signs of decreased coronary artery perfusion - ex.  Angina  - Evaluate fluid balance, assess for edema, trend weights  Outcome: Not Progressing     Problem: SKIN/TISSUE INTEGRITY - ADULT  Goal: Skin integrity remains intact  Description: INTERVENTIONS  - Assess and document risk factors for pressure ulcer development  - Assess and document skin integrity  - Monitor for areas of redness and/or skin breakdown  - Initiate interventions, skin care algorithm/standards of care as needed  Outcome: Not Progressing

## 2022-02-04 NOTE — PLAN OF CARE
Problem: RESPIRATORY - ADULT  Goal: Achieves optimal ventilation and oxygenation  Description: INTERVENTIONS:  - Assess for changes in respiratory status  - Assess for changes in mentation and behavior  - Position to facilitate oxygenation and minimize respiratory effort  - Oxygen supplementation based on oxygen saturation or ABGs  - Provide Smoking Cessation handout, if applicable  - Encourage broncho-pulmonary hygiene including cough, deep breathe, Incentive Spirometry  - Assess the need for suctioning and perform as needed  - Assess and instruct to report SOB or any respiratory difficulty  - Respiratory Therapy support as indicated  - Manage/alleviate anxiety  - Monitor for signs/symptoms of CO2 retention  Outcome: Progressing Pt received intubated on full support PRVC 34/450/+15/70%. ETT 7.5 secure 23 @ lips. Bilateral BS auscultated. Suction provided when indicated. Nitric being administered.

## 2022-02-04 NOTE — PLAN OF CARE
Patient is comatose and vented with settings below, weak gag and cough. Nitric weaned as tolerated. Precedex, Heparin, and Levophed infusing. CRRT ongoing - HD catheter is very positional. Moderate amount of secretions suctioned inline and orally. Orogastric tube to tube feeding. Delcid with little to no output. Flexiseal in place.     Vent Mode: PRVC/AC  FiO2 (%):  [65 %-80 %] 80 %  S RR:  [34] 34  S VT:  [450 mL] 450 mL  PEEP/CPAP (cm H2O):  [15 cm H20] 15 cm H20  MAP (cm H2O):  [23-25] 25      Problem: RESPIRATORY - ADULT  Goal: Achieves optimal ventilation and oxygenation  Description: INTERVENTIONS:  - Assess for changes in respiratory status  - Assess for changes in mentation and behavior  - Position to facilitate oxygenation and minimize respiratory effort  - Oxygen supplementation based on oxygen saturation or ABGs  - Provide Smoking Cessation handout, if applicable  - Encourage broncho-pulmonary hygiene including cough, deep breathe, Incentive Spirometry  - Assess the need for suctioning and perform as needed  - Assess and instruct to report SOB or any respiratory difficulty  - Respiratory Therapy support as indicated  - Manage/alleviate anxiety  - Monitor for signs/symptoms of CO2 retention  Outcome: Not Progressing     Problem: SKIN/TISSUE INTEGRITY - ADULT  Goal: Skin integrity remains intact  Description: INTERVENTIONS  - Assess and document risk factors for pressure ulcer development  - Assess and document skin integrity  - Monitor for areas of redness and/or skin breakdown  - Initiate interventions, skin care algorithm/standards of care as needed  Outcome: Not Progressing

## 2022-02-05 NOTE — PROGRESS NOTES
[address] [facility] dosing service    Follow-up Pharmacokinetic Consult for Vancomycin Dosing     Alf De La Torre is a 46year old patient who is being treated for pneumonia and sepsis. Patient is on day 12 of Vancomycin and is currently receiving 1000 mg IV Q24hrs. Dialysis Details:  CRRT at 2L/hr    Levels:  trough. 18.5 mcg/mL      Based on the above:    1. Continue Vancomycin at 1000 mg IVPB every 24hrs  based on pharmacokinetics and CRRT rate of 2L/hrs.    2.  Pharmacy will recheck a vancomycin levels based on CRRT rate. Goal pre-dialysis level is 15-20 mcg/mL which is likely to achieve the goal AUC24 of 400 to 600 mg-h/L.    3. Pharmacy will follow and monitor renal function, toxicity and efficacy. We appreciate the opportunity to assist in the care of this patient.     Sanchez Kingston, PharmD  2/5/2022  1:08 PM  [address]: [phone]

## 2022-02-05 NOTE — PLAN OF CARE
Problem: PAIN - ADULT  Goal: Verbalizes/displays adequate comfort level or patient's stated pain goal  Description: INTERVENTIONS:  - Encourage pt to monitor pain and request assistance  - Assess pain using appropriate pain scale  - Administer analgesics based on type and severity of pain and evaluate response  - Implement non-pharmacological measures as appropriate and evaluate response  - Consider cultural and social influences on pain and pain management  - Manage/alleviate anxiety  - Utilize distraction and/or relaxation techniques  - Monitor for opioid side effects  - Notify MD/LIP if interventions unsuccessful or patient reports new pain  - Anticipate increased pain with activity and pre-medicate as appropriate  Outcome: Progressing     Problem: CARDIOVASCULAR - ADULT  Goal: Absence of cardiac arrhythmias or at baseline  Description: INTERVENTIONS:  - Continuous cardiac monitoring, monitor vital signs, obtain 12 lead EKG if indicated  - Evaluate effectiveness of antiarrhythmic and heart rate control medications as ordered  - Initiate emergency measures for life threatening arrhythmias  - Monitor electrolytes and administer replacement therapy as ordered  Outcome: Progressing     Problem: RESPIRATORY - ADULT  Goal: Achieves optimal ventilation and oxygenation  Description: INTERVENTIONS:  - Assess for changes in respiratory status  - Assess for changes in mentation and behavior  - Position to facilitate oxygenation and minimize respiratory effort  - Oxygen supplementation based on oxygen saturation or ABGs  - Provide Smoking Cessation handout, if applicable  - Encourage broncho-pulmonary hygiene including cough, deep breathe, Incentive Spirometry  - Assess the need for suctioning and perform as needed  - Assess and instruct to report SOB or any respiratory difficulty  - Respiratory Therapy support as indicated  - Manage/alleviate anxiety  - Monitor for signs/symptoms of CO2 retention  Outcome: Not Progressing     Problem: CARDIOVASCULAR - ADULT  Goal: Maintains optimal cardiac output and hemodynamic stability  Description: INTERVENTIONS:  - Monitor vital signs, rhythm, and trends  - Monitor for bleeding, hypotension and signs of decreased cardiac output  - Evaluate effectiveness of vasoactive medications to optimize hemodynamic stability  - Monitor arterial and/or venous puncture sites for bleeding and/or hematoma  - Assess quality of pulses, skin color and temperature  - Assess for signs of decreased coronary artery perfusion - ex.  Angina  - Evaluate fluid balance, assess for edema, trend weights  Outcome: Not Progressing     Problem: SKIN/TISSUE INTEGRITY - ADULT  Goal: Skin integrity remains intact  Description: INTERVENTIONS  - Assess and document risk factors for pressure ulcer development  - Assess and document skin integrity  - Monitor for areas of redness and/or skin breakdown  - Initiate interventions, skin care algorithm/standards of care as needed  Outcome: Not Progressing

## 2022-02-05 NOTE — CM/SW NOTE
Care Coordination Transfer Note    Spoke to [facility]: They are not open for transfers, only for LVAD pt's. They are requesting the Hospitalist number in case the fellow will call for report. Spoke to [facility]: They do not have any ICU beds    Spoke to  [facility]: They are on transfer hold    Spoke to [facility]: They are on transfer hold    Spoke to [facility]:  They are not taking COVID pt's

## 2022-02-05 NOTE — PLAN OF CARE
Problem: RESPIRATORY - ADULT  Goal: Achieves optimal ventilation and oxygenation  Description: INTERVENTIONS:  - Assess for changes in respiratory status  - Assess for changes in mentation and behavior  - Position to facilitate oxygenation and minimize respiratory effort  - Oxygen supplementation based on oxygen saturation or ABGs  - Provide Smoking Cessation handout, if applicable  - Encourage broncho-pulmonary hygiene including cough, deep breathe, Incentive Spirometry  - Assess the need for suctioning and perform as needed  - Assess and instruct to report SOB or any respiratory difficulty  - Respiratory Therapy support as indicated  - Manage/alleviate anxiety  - Monitor for signs/symptoms of CO2 retention  Outcome: Progressing   Pt received intubated PRVC/AC 34/450/75%+15. Nitric in use,no changes on current shift. Pt suctioned as needed.

## 2022-02-05 NOTE — PROGRESS NOTES
Patient received on vent settings noted, No changes made on vent at this time. Pt suctioned as needed, RT will continue to monitor pt.    02/05/22 6290   Vent Information   $ RT Standby Charge (per 15 min) 1  (vent/NO check)   $ Vent Care / Non-Invasive Subsequent Day Yes   Ventilator Initiation 01/13/22   Interface Invasive   Vent Type AV   Vent ID [id number]   Vent Mode PRVC/AC   Settings   FiO2 (%) 70 %   Resp Rate (Set) 34   Vt (Set, mL) 450 mL   Waveform Decelerating ramp   PEEP/CPAP (cm H2O) 15 cm H20   Bias Flow 3   Insp Time (sec) 0.75 sec   Trigger Sensitivity Flow (L/min) 3 L/min   Trigger Sensitivity Pressure (cm H2O) 3 cm H2O   Humidification Heat and moisture exchanger   Readings   ETCO2 (mmHg) 58 mmHg   Total RR 34   Minute Ventilation (L/min) 13.2 L/min   Expiratory Tidal Volume 440 mL   PIP Observed (cm H2O) 41 cm H2O   MAP (cm H2O) 24   Alarms   High RR 50   Insp Pressure High (cm H2O) 60 cm H2O   Insp Pressure Low (cm H2O) 8 cm H2O   MV High (L/min) 30 L/min   MV Low (L/min) 3 L/min   Apnea Interval (sec) 20 seconds   Apnea Rate 34   Apnea Volume (mL) 450 mL   ETT   Placement Date/Time: 01/13/22 9625   Airway Size: 7.5 mm   Secured at (cm) 23 cm   Suctioned?  Y   Measured From [address] by Commercial tube camejo   Req'd equipment at bedside Bag mask

## 2022-02-06 NOTE — CM/SW NOTE
Care Coordination [facility] Transfer Note    Contacted [facility]: They do not have any ICU beds    Contacted [facility]: They do not have any ICU beds    [address]: They do not have any ICU beds    Contacted [facility]:  They do not have any ICU beds    Contacted [facility]: They are not taking any transfers

## 2022-02-06 NOTE — PLAN OF CARE
Patient is vented with settings below + nitric. Gag with spontaneous cough intact after removing bite block. Precedex, Heparin, and Levophed infusing. CRRT ongoing. Janie hugger turned off at midnight. Delcid with zero output. Flexiseal in place. Orogastric tube to tube feeding at goal rate. Endotracheal tube camejo changed - tube requiring support by towels, otherwise keeps sliding down.  updated on plan of care.     Vent Mode: PRVC/AC  FiO2 (%):  [70 %-75 %] 70 %  S RR:  [34] 34  S VT:  [450 mL] 450 mL  PEEP/CPAP (cm H2O):  [15 cm H20] 15 cm H20  MAP (cm H2O):  [23-25] 24      Problem: Diabetes/Glucose Control  Goal: Glucose maintained within prescribed range  Description: INTERVENTIONS:  - Monitor Blood Glucose as ordered  - Assess for signs and symptoms of hyperglycemia and hypoglycemia  - Administer ordered medications to maintain glucose within target range  - Assess barriers to adequate nutritional intake and initiate nutrition consult as needed  - Instruct patient on self management of diabetes  Outcome: Progressing     Problem: RESPIRATORY - ADULT  Goal: Achieves optimal ventilation and oxygenation  Description: INTERVENTIONS:  - Assess for changes in respiratory status  - Assess for changes in mentation and behavior  - Position to facilitate oxygenation and minimize respiratory effort  - Oxygen supplementation based on oxygen saturation or ABGs  - Provide Smoking Cessation handout, if applicable  - Encourage broncho-pulmonary hygiene including cough, deep breathe, Incentive Spirometry  - Assess the need for suctioning and perform as needed  - Assess and instruct to report SOB or any respiratory difficulty  - Respiratory Therapy support as indicated  - Manage/alleviate anxiety  - Monitor for signs/symptoms of CO2 retention  Outcome: Not Progressing     Problem: CARDIOVASCULAR - ADULT  Goal: Maintains optimal cardiac output and hemodynamic stability  Description: INTERVENTIONS:  - Monitor vital signs, rhythm, and trends  - Monitor for bleeding, hypotension and signs of decreased cardiac output  - Evaluate effectiveness of vasoactive medications to optimize hemodynamic stability  - Monitor arterial and/or venous puncture sites for bleeding and/or hematoma  - Assess quality of pulses, skin color and temperature  - Assess for signs of decreased coronary artery perfusion - ex.  Angina  - Evaluate fluid balance, assess for edema, trend weights  Outcome: Not Progressing     Problem: SKIN/TISSUE INTEGRITY - ADULT  Goal: Skin integrity remains intact  Description: INTERVENTIONS  - Assess and document risk factors for pressure ulcer development  - Assess and document skin integrity  - Monitor for areas of redness and/or skin breakdown  - Initiate interventions, skin care algorithm/standards of care as needed  Outcome: Not Progressing

## 2022-02-07 NOTE — PLAN OF CARE
Patient is vented with settings below + nitric oxide, weaned as tolerated. Weak gag and spontaneous cough. CRRT ongoing. Unable to meet net zero requirements with increasing dosage of Levophed - MD notified and Vaso restarted. Precedex and Heparin also infusing. Ativan given for comfort. Orogastric tube to tube feeding. Delcid with no output. Flexiseal in place, irrigated as needed. Tolerated turns rather well.  and daughter were at the bedside during visiting hours. Addendum: Hgb 6.6 - MD Lolly paged, order for 1 unit PRBC to be transfused.     Vent Mode: PRVC/AC  FiO2 (%):  [65 %-100 %] 65 %  S RR:  [34] 34  S VT:  [450 mL] 450 mL  PEEP/CPAP (cm H2O):  [15 cm H20] 15 cm H20  MAP (cm H2O):  [23-29] 25      Problem: RESPIRATORY - ADULT  Goal: Achieves optimal ventilation and oxygenation  Description: INTERVENTIONS:  - Assess for changes in respiratory status  - Assess for changes in mentation and behavior  - Position to facilitate oxygenation and minimize respiratory effort  - Oxygen supplementation based on oxygen saturation or ABGs  - Provide Smoking Cessation handout, if applicable  - Encourage broncho-pulmonary hygiene including cough, deep breathe, Incentive Spirometry  - Assess the need for suctioning and perform as needed  - Assess and instruct to report SOB or any respiratory difficulty  - Respiratory Therapy support as indicated  - Manage/alleviate anxiety  - Monitor for signs/symptoms of CO2 retention  Outcome: Progressing     Problem: CARDIOVASCULAR - ADULT  Goal: Maintains optimal cardiac output and hemodynamic stability  Description: INTERVENTIONS:  - Monitor vital signs, rhythm, and trends  - Monitor for bleeding, hypotension and signs of decreased cardiac output  - Evaluate effectiveness of vasoactive medications to optimize hemodynamic stability  - Monitor arterial and/or venous puncture sites for bleeding and/or hematoma  - Assess quality of pulses, skin color and temperature  - Assess for signs of decreased coronary artery perfusion - ex.  Angina  - Evaluate fluid balance, assess for edema, trend weights  Outcome: Not Progressing     Problem: SKIN/TISSUE INTEGRITY - ADULT  Goal: Skin integrity remains intact  Description: INTERVENTIONS  - Assess and document risk factors for pressure ulcer development  - Assess and document skin integrity  - Monitor for areas of redness and/or skin breakdown  - Initiate interventions, skin care algorithm/standards of care as needed  Outcome: Not Progressing

## 2022-02-07 NOTE — PROGRESS NOTES
RESPIRATORY THERAPY MECHANICAL VENTILATION PROGRESS NOTE    Ventilator Weaning:  Patient meets criteria for weaning? no Weaning was attempted no     Current Ventilator Data:  Patient received on following vent settings with Nitric 5 PPM. ET tube 7.5 secured at 24 @ the lips. Suction scant amount of white thin secretions. No acute event/changes made at this time. RT will continue to monitor.      02/07/22 0812   Vent Information   Vent Mode PRVC/AC   Settings   FiO2 (%) 70 %   Resp Rate (Set) 34   Vt (Set, mL) 450 mL   Waveform Decelerating ramp   PEEP/CPAP (cm H2O) 15 cm H20   Insp Time (sec) 0.75 sec   Trigger Sensitivity Flow (L/min) 3 L/min   Humidification Heat and moisture exchanger   Readings   Total RR 34   Minute Ventilation (L/min) 13.5 L/min   Expiratory Tidal Volume 420 mL   PIP Observed (cm H2O) 35 cm H2O   MAP (cm H2O) 24   I/E Ratio 1:1.4   Plateau Pressure (cm H2O) 41 cm H2O   Static Compliance (L/cm H2O) 15   Dynamic Compliance (L/cm H2O) 18 L/cm H2O

## 2022-02-07 NOTE — PROGRESS NOTES
Received PT intubated with a (7.5) ETT secured at (23) on vent with the following settings; BS heard bilaterally, SXN provided as needed. FiO2 titrated to 65%. Pt tolerating well. RT to continue to monitor.         02/07/22 0421   Vent Information   Vent Mode PRVC/AC   Settings   FiO2 (%) 65 %   Resp Rate (Set) 34   Vt (Set, mL) 450 mL   Waveform Decelerating ramp   PEEP/CPAP (cm H2O) 15 cm H20   PEEP Low (cm H2O) 8 cm H2O   Bias Flow 3   Insp Time (sec) 0.75 sec   Trigger Sensitivity Flow (L/min) 3 L/min   Humidification Heat and moisture exchanger   Readings   Total RR 34   Minute Ventilation (L/min) 13.7 L/min   Expiratory Tidal Volume 430 mL   PIP Observed (cm H2O) 42 cm H2O   MAP (cm H2O) 25   PEEP Low (cm H2O) 8 cm H2O   Plateau Pressure (cm H2O) 41 cm H2O   Static Compliance (L/cm H2O) 17   Dynamic Compliance (L/cm H2O) 17 L/cm H2O

## 2022-02-07 NOTE — RESPIRATORY THERAPY NOTE
ABG result: PH- 7.16, PCO2- 87, PO2- 56, HCO3- 25.7, BE 1.2, SO2- 92.7  Notified Dr. Kelley Mathews.

## 2022-02-08 NOTE — PROGRESS NOTES
ABG reviewed. Increase Vt to 500 and monitor Ppl. Noted to have worsening hypoxemia, but ABG was drawn when Flavio was turned off. Now Flavio is back on with improved SaO2.

## 2022-02-08 NOTE — PROGRESS NOTES
Attempted to wean inhaled nitric oxide as per Dr. Jake Rdz. NO shut off by RT, FiO2 increased to 100%. Saturations proceeded to drop to 82% with no improvement. NO restarted by RT at 2ppm, saturations improving. ABG drawn. During this time also having worsening ST depression, stat 12 lead EKG being done. Dr. Jake Rdz paged and updated on failed nitric wean as well as ST changes. Awaiting EKG reading. Vent orders changed by Dr. Jake Rdz, RT notified.

## 2022-02-08 NOTE — PROGRESS NOTES
Patient received on vent settings noted, FiO2 has been changed to meet the patients need, at this time pt is on 75% no other changes made. Pt remains on 5 PPM of N.O. therapy. Pt suctioned as needed. RT will continue to monitor pt.     02/08/22 8207   Vent Information   $ RT Standby Charge (per 15 min) 1  (vent/N.O. check)   $ Vent Care / Non-Invasive Subsequent Day Yes   Ventilator Initiation 01/13/22   Interface Invasive   Vent Type AV   Vent ID [id number]   Vent Mode PRVC/AC   Settings   FiO2 (%) 75 %   Resp Rate (Set) 34   Vt (Set, mL) 450 mL   Waveform Decelerating ramp   PEEP/CPAP (cm H2O) 15 cm H20   Bias Flow 2   Insp Time (sec) 0.75 sec   Readings   ETCO2 (mmHg) 63 mmHg   Total RR 34   Minute Ventilation (L/min) 14 L/min   Expiratory Tidal Volume 420 mL   PIP Observed (cm H2O) 33 cm H2O   MAP (cm H2O) 23   Alarms   High RR 50   Insp Pressure High (cm H2O) 60 cm H2O   Insp Pressure Low (cm H2O) 10 cm H2O   MV High (L/min) 30 L/min   MV Low (L/min) 3 L/min   Apnea Interval (sec) 20 seconds   Apnea Rate 34   Apnea Volume (mL) 450 mL   ETT   Placement Date/Time: 01/13/22 1583   Airway Size: 7.5 mm   Secured at (cm) 24 cm   Suctioned?  Y   Measured From Lips   Secured Location Right   Secured by Commercial tube camejo   Req'd equipment at bedside Bag mask

## 2022-02-08 NOTE — PLAN OF CARE
Problem: ALTERED NUTRIENT INTAKE - ADULT  Goal: Nutrient intake appropriate for improving, restoring or maintaining nutritional needs  Description: INTERVENTIONS:  - Assess nutritional status and recommend course of action  - Monitor oral intake when eating, labs, and treatment plans  - Recommend appropriate diets, oral nutritional supplements, and vitamin/mineral supplements  - Recommend, monitor, and adjust tube feedings  based on assessed needs  - Provide specific nutrition education as appropriate  Outcome: Progressing

## 2022-02-08 NOTE — RESPIRATORY THERAPY NOTE
Patient settings changed post ABG: rate increased from 34bpm to 35bpm, and VT increased from 450ml to 500ml. Original Vent Settings: 450/34/+15/85%    Nitric weaned from 5 to 0. Patient unable to tolerated Nitric being weaned.  Nitric placed back on 1ppm.    2:18pm- increase nitric to 5 ppm

## 2022-02-09 NOTE — PLAN OF CARE
Problem: Patient Centered Care  Goal: Patient preferences are identified and integrated in the patient's plan of care  Description: Interventions:  - What would you like us to know as we care for you? My  is my point of contact.   - Provide timely, complete, and accurate information to patient/family  - Incorporate patient and family knowledge, values, beliefs, and cultural backgrounds into the planning and delivery of care  - Encourage patient/family to participate in care and decision-making at the level they choose  - Honor patient and family perspectives and choices  Outcome: Progressing     Problem: Patient/Family Goals  Goal: Patient/Family Long Term Goal  Description: Patient's Long Term Goal: to go home (assumed)    Interventions:  - - See additional Care Plan goals for specific interventions  Outcome: Not Progressing  Goal: Patient/Family Short Term Goal  Description: Patient's Short Term Goal: to breathe better    Interventions:   -   - See additional Care Plan goals for specific interventions  Outcome: Not Progressing     Problem: PAIN - ADULT  Goal: Verbalizes/displays adequate comfort level or patient's stated pain goal  Description: INTERVENTIONS:  - Encourage pt to monitor pain and request assistance  - Assess pain using appropriate pain scale  - Administer analgesics based on type and severity of pain and evaluate response  - Implement non-pharmacological measures as appropriate and evaluate response  - Consider cultural and social influences on pain and pain management  - Manage/alleviate anxiety  - Utilize distraction and/or relaxation techniques  - Monitor for opioid side effects  - Notify MD/LIP if interventions unsuccessful or patient reports new pain  - Anticipate increased pain with activity and pre-medicate as appropriate  Outcome: Progressing     Problem: SAFETY ADULT - FALL  Goal: Free from fall injury  Description: INTERVENTIONS:  - Assess pt frequently for physical needs  - Identify cognitive and physical deficits and behaviors that affect risk of falls.   - [place] fall precautions as indicated by assessment.  - Educate pt/family on patient safety including physical limitations  - Instruct pt to call for assistance with activity based on assessment  - Modify environment to reduce risk of injury  - Provide assistive devices as appropriate  - Consider OT/PT consult to assist with strengthening/mobility  - Encourage toileting schedule  Outcome: Progressing     Problem: DISCHARGE PLANNING  Goal: Discharge to home or other facility with appropriate resources  Description: INTERVENTIONS:  - Identify barriers to discharge w/pt and caregiver  - Include patient/family/discharge partner in discharge planning  - Arrange for needed discharge resources and transportation as appropriate  - Identify discharge learning needs (meds, wound care, etc)  - Arrange for interpreters to assist at discharge as needed  - Consider post-discharge preferences of patient/family/discharge partner  - Complete POLST form as appropriate  - Assess patient's ability to be responsible for managing their own health  - Refer to Case Management Department for coordinating discharge planning if the patient needs post-hospital services based on physician/LIP order or complex needs related to functional status, cognitive ability or social support system  Outcome: Not Progressing     Problem: RESPIRATORY - ADULT  Goal: Achieves optimal ventilation and oxygenation  Description: INTERVENTIONS:  - Assess for changes in respiratory status  - Assess for changes in mentation and behavior  - Position to facilitate oxygenation and minimize respiratory effort  - Oxygen supplementation based on oxygen saturation or ABGs  - Provide Smoking Cessation handout, if applicable  - Encourage broncho-pulmonary hygiene including cough, deep breathe, Incentive Spirometry  - Assess the need for suctioning and perform as needed  - Assess and instruct to report SOB or any respiratory difficulty  - Respiratory Therapy support as indicated  - Manage/alleviate anxiety  - Monitor for signs/symptoms of CO2 retention  Outcome: Not Progressing

## 2022-02-09 NOTE — PLAN OF CARE
Received patient at 1900 intubated and sedated. Phos and K came back low (see results), Nephrology (MD Bib Herrera) called, K/Phos ordered (see MAR) per MD. Patient desaturating to low 80's with IrY1=876% , on call Pulmonary called (MD Joselyn Dunham) Nitric increased from 5 to 8. Patient meeting SpO2 goals but is still max on vent settings. CRRT still running, Net removal goal met.  at bed side, agreeable to plan of care, no father questions at this time. Patient turned Q2, all dressings changed.  Will continue to monitor

## 2022-02-09 NOTE — RESPIRATORY THERAPY NOTE
Patient received intubated with vent settings of PRVC/AC 35/500/+15/100%/IT 1.0, ETT size 7.5 secured 24 @ the lip. LOUISE BS heard, suction provided as needed. patient tolerating vent fairly ok, maintaining SpO2 >88%. RT to continue monitor patient. Received pt on 5ppm NO, increased to 8 ppm per Dr. Susi Rodriguez.

## 2022-02-09 NOTE — CM/SW NOTE
CM spoke with [facility] who states patient is still on the list to transfer, however, they do not believe patient is stable for transfer at this time.

## 2022-02-10 NOTE — PLAN OF CARE
Problem: RESPIRATORY - ADULT  Goal: Achieves optimal ventilation and oxygenation  Description: INTERVENTIONS:  - Assess for changes in respiratory status  - Assess for changes in mentation and behavior  - Position to facilitate oxygenation and minimize respiratory effort  - Oxygen supplementation based on oxygen saturation or ABGs  - Provide Smoking Cessation handout, if applicable  - Encourage broncho-pulmonary hygiene including cough, deep breathe, Incentive Spirometry  - Assess the need for suctioning and perform as needed  - Assess and instruct to report SOB or any respiratory difficulty  - Respiratory Therapy support as indicated  - Manage/alleviate anxiety  - Monitor for signs/symptoms of CO2 retention  Outcome: Progressing   Patient received intubated and on ventilator PRVC 35/500/+15/100% with 10 PPM of Nitric Oxide. FiO2 reduced to 90% while maintaining appropriate SpO2 on monitor. Bilateral breath sounds auscultated. Suction provided when indicated. No acute events overnight. RT will continue to monitor.

## 2022-02-10 NOTE — CM/SW NOTE
Care Coordination [facility] Transfer Note:     spoke with Nga Marcial RN who states that pt is not stable for transfer today. RN will update Transfer Center when pt is stable for transfer.     ALEJANDRA Dalal RN, [address]  X [phone]

## 2022-02-10 NOTE — CM/SW NOTE
Pt discussed in rounds and remains on full vent support, Nitric, CRRT, pressors. Pulmonology, ID, Nephrology following  Transfer Coordinators following and attempting to transfer to [address] [place] [address],[facility] / to remain available for support and/or discharge planning.      Mita Guerra MBA MSN, RN CTL/  [id number]

## 2022-02-11 NOTE — PLAN OF CARE
Patient is vented with settings below; patient desaturated with turn and suctioning. Respiratory notified and FiO2 is now at 100% +Nitric. Required frequent oral suctioning. Positive gag, cough, and corneal reflexes. Precedex, Levophed, Vasopressin, and Heparin infusing. Ativan given for comfort. CRRT ongoing. Orogastric tube to tube feeding. Flexiseal in place and irrigated as necessary. Daughter, Vanessa Pablo, updated on plan of care. Vent Mode: PRVC/AC  FiO2 (%):  [85 %-100 %] 100 %  S RR:  [35] 35  S VT:  [500 mL] 500 mL  PEEP/CPAP (cm H2O):  [15 cm H20] 15 cm H20  MAP (cm H2O):  [28-31] 29      Problem: RESPIRATORY - ADULT  Goal: Achieves optimal ventilation and oxygenation  Description: INTERVENTIONS:  - Assess for changes in respiratory status  - Assess for changes in mentation and behavior  - Position to facilitate oxygenation and minimize respiratory effort  - Oxygen supplementation based on oxygen saturation or ABGs  - Provide Smoking Cessation handout, if applicable  - Encourage broncho-pulmonary hygiene including cough, deep breathe, Incentive Spirometry  - Assess the need for suctioning and perform as needed  - Assess and instruct to report SOB or any respiratory difficulty  - Respiratory Therapy support as indicated  - Manage/alleviate anxiety  - Monitor for signs/symptoms of CO2 retention  Outcome: Not Progressing     Problem: CARDIOVASCULAR - ADULT  Goal: Maintains optimal cardiac output and hemodynamic stability  Description: INTERVENTIONS:  - Monitor vital signs, rhythm, and trends  - Monitor for bleeding, hypotension and signs of decreased cardiac output  - Evaluate effectiveness of vasoactive medications to optimize hemodynamic stability  - Monitor arterial and/or venous puncture sites for bleeding and/or hematoma  - Assess quality of pulses, skin color and temperature  - Assess for signs of decreased coronary artery perfusion - ex.  Angina  - Evaluate fluid balance, assess for edema, trend weights  Outcome: Not Progressing     Problem: SKIN/TISSUE INTEGRITY - ADULT  Goal: Skin integrity remains intact  Description: INTERVENTIONS  - Assess and document risk factors for pressure ulcer development  - Assess and document skin integrity  - Monitor for areas of redness and/or skin breakdown  - Initiate interventions, skin care algorithm/standards of care as needed  Outcome: Not Progressing     Problem: Diabetes/Glucose Control  Goal: Glucose maintained within prescribed range  Description: INTERVENTIONS:  - Monitor Blood Glucose as ordered  - Assess for signs and symptoms of hyperglycemia and hypoglycemia  - Administer ordered medications to maintain glucose within target range  - Assess barriers to adequate nutritional intake and initiate nutrition consult as needed  - Instruct patient on self management of diabetes  Outcome: Not Progressing

## 2022-02-11 NOTE — PROGRESS NOTES
Patient received on vent settings with Nitric Therapy 10ppm noted, FiO2 was increased to 100% for desaturation, pt suctioned as needed, RT will continue to monitor pt.     02/11/22 1232   Vent Information   $ RT Standby Charge (per 15 min) 1  (vent check)   $ Vent Care / Non-Invasive Subsequent Day Yes   Ventilator Initiation 01/13/22   Interface Invasive   Vent Type AV   Vent ID [id number]   Vent Mode PRVC/AC   Settings   FiO2 (%) 100 %   Resp Rate (Set) 35   Vt (Set, mL) 500 mL   Waveform Decelerating ramp   PEEP/CPAP (cm H2O) 15 cm H20   Insp Time (sec) 1 sec   Trigger Sensitivity Flow (L/min) 3 L/min   Trigger Sensitivity Pressure (cm H2O) 3 cm H2O   Humidification Heat and moisture exchanger   Readings   ETCO2 (mmHg) 63 mmHg   Total RR 36   Minute Ventilation (L/min) 16.8 L/min   Expiratory Tidal Volume 510 mL   PIP Observed (cm H2O) 40 cm H2O   MAP (cm H2O) 29   Alarms   High RR 45   Insp Pressure High (cm H2O) 60 cm H2O   Insp Pressure Low (cm H2O) 10 cm H2O   MV High (L/min) 25 L/min   MV Low (L/min) 3 L/min   Apnea Interval (sec) 20 seconds   ETT   Placement Date/Time: 01/13/22 6755   Airway Size: 7.5 mm   Secured at (cm) 24 cm   Suctioned?  Y   Measured From [address] by [employer] tube camejo   Req'd equipment at bedside Bag mask

## 2022-02-11 NOTE — PROGRESS NOTES
Called to bedside for worsening hypoxemia. Stat CXR was obtained that showed moderate sized right pneumothorax. Risks and benefits to chest tube insertion were discussed with  and he agreed with proceeding. See separate procedure note. After chest tube insertion discussed code status with her  and daughter. They have elected to make her DNAR / full treatment.  Additional critical care time: 55 minutes

## 2022-02-11 NOTE — PROGRESS NOTES
Chest Tube Insertion Procedure Note     Indication: Right pneumothorax    Procedure: Time out was performed and the appropriate side was confirmed and marked. Patient was prepped and draped in a sterile manner using chlorhexidine scrub after the patient was positioned in the usual fashion. A 2 cm incision was then made parallel to the rib in the midaxillary line at the level of the 4th rib. The subcutaneous tissue superior to the rib was dissected bluntly to the level of the pleura. The pleura was then entered bluntly. Air was noted from the pleural space. The disruption in the parietal pleura was expanded bluntly and a finger was inserted and swept carefully in all directions. A 32 [place] chest tube was then inserted using my finger as a guide. The chest tube was inserted easily without resistance. The chest tube was sutured to the skin at the insertion site at 14 cm and connected securely to a pleurovac. A sterile occlusive dressing was placed over the insertion site. No immediate complications were noted. EBL <5 cc. A post-procedure chest x-ray is pending at the time of this note.

## 2022-02-11 NOTE — SPIRITUAL CARE NOTE
Pt was unconscious in bed, surrounded by  and daughter in a well lit room. RN requested  for family as pt's status was declining.  prayed at bedside with family. Family requested Alevism anointing, which the  arranged.  left with a blessing and will refer to on call .

## 2022-02-12 NOTE — PLAN OF CARE
Problem: RESPIRATORY - ADULT  Goal: Achieves optimal ventilation and oxygenation  Description: INTERVENTIONS:  - Assess for changes in respiratory status  - Assess for changes in mentation and behavior  - Position to facilitate oxygenation and minimize respiratory effort  - Oxygen supplementation based on oxygen saturation or ABGs  - Provide Smoking Cessation handout, if applicable  - Encourage broncho-pulmonary hygiene including cough, deep breathe, Incentive Spirometry  - Assess the need for suctioning and perform as needed  - Assess and instruct to report SOB or any respiratory difficulty  - Respiratory Therapy support as indicated  - Manage/alleviate anxiety  - Monitor for signs/symptoms of CO2 retention  Outcome: Progressing   Patient received intubated and on ventilator PRVC 35/500/+15/100% with 40 PPM of Nitric Oxide. Bilateral breath sounds auscultated. Suction provided when indicated. No acute events overnight. RT will continue to monitor.

## 2022-02-12 NOTE — PROGRESS NOTES
Called to evaluate patient as has been persistently hypotensive / hypoxic,     Patient with MAPs in the 40s  Sats mid 70-low 80s, max vent settings    Stat CXR reviewed , no new obvious ptx    Fritz started, maxed on levo and vaso    CRRT fluid removal reduced    Discussed with  at bedside    Patient appears to be declining despite maximium intervention, prognosis poor    Discussed with Dr. Margie Estrada from [facility] as well    Additional Critical care time 32 minutes

## 2022-02-13 NOTE — PROGRESS NOTES
02/12/22 1900   Clinical Encounter Type   Visited With Patient and family together;Health care provider   Routine Visit Follow-up   Continue Visiting Yes   Surgical Visit Post-op   Crisis Visit Critical care; Patient actively dying   Patient's Supportive Strategies/Resources Spouse and daughter   Referral From Nurse   Referral To [address] [place] [address]   Spiritual Requests During Visit / Hospitalization Requests  Visit   Patient Spiritual Encounters   Spiritual Needs  visit   Spiritual Interventions Nurse called and requested  support to  and daughter.  met family at bedside. Daughter Laurinda Goldmann is having hard time coping. She is the only child.  provided support at bedside. Family participated in prayer. Pt received anointing of the sick sacrament. Day  may follow-up.

## 2022-02-13 NOTE — PLAN OF CARE
Problem: Patient Centered Care  Goal: Patient preferences are identified and integrated in the patient's plan of care  Description: Interventions:  - What would you like us to know as we care for you? My  is my point of contact. - Provide timely, complete, and accurate information to patient/family  - Incorporate patient and family knowledge, values, beliefs, and cultural backgrounds into the planning and delivery of care  - Encourage patient/family to participate in care and decision-making at the level they choose  - Honor patient and family perspectives and choices  Outcome: Progressing  Note:  and daughter were present at bedside for initial part of shift. Updates on plan of care provided. Problem: PAIN - ADULT  Goal: Verbalizes/displays adequate comfort level or patient's stated pain goal  Description: INTERVENTIONS:  - Encourage pt to monitor pain and request assistance  - Assess pain using appropriate pain scale  - Administer analgesics based on type and severity of pain and evaluate response  - Implement non-pharmacological measures as appropriate and evaluate response  - Consider cultural and social influences on pain and pain management  - Manage/alleviate anxiety  - Utilize distraction and/or relaxation techniques  - Monitor for opioid side effects  - Notify MD/LIP if interventions unsuccessful or patient reports new pain  - Anticipate increased pain with activity and pre-medicate as appropriate  Outcome: Progressing  Note: Patient does not appear to be in any discomfort or distress. Unable to effectively assess due to paralytic therapy. Problem: RISK FOR INFECTION - ADULT  Goal: Absence of fever/infection during anticipated neutropenic period  Description: INTERVENTIONS  - Monitor WBC  - Administer growth factors as ordered  - Implement neutropenic guidelines  Outcome: Progressing  Note: Patient's temperatures have been normothermic.      Problem: SAFETY ADULT - FALL  Goal: Free from fall injury  Description: INTERVENTIONS:  - Assess pt frequently for physical needs  - Identify cognitive and physical deficits and behaviors that affect risk of falls. - [place] fall precautions as indicated by assessment.  - Educate pt/family on patient safety including physical limitations  - Instruct pt to call for assistance with activity based on assessment  - Modify environment to reduce risk of injury  - Provide assistive devices as appropriate  - Consider OT/PT consult to assist with strengthening/mobility  - Encourage toileting schedule  Outcome: Progressing  Note: Bed remains in lowest/locked position with call light within reach. Only slight changes in position could be made due to patient's intolerance to turns or major disturbances in position. Problem: RESPIRATORY - ADULT  Goal: Achieves optimal ventilation and oxygenation  Description: INTERVENTIONS:  - Assess for changes in respiratory status  - Assess for changes in mentation and behavior  - Position to facilitate oxygenation and minimize respiratory effort  - Oxygen supplementation based on oxygen saturation or ABGs  - Provide Smoking Cessation handout, if applicable  - Encourage broncho-pulmonary hygiene including cough, deep breathe, Incentive Spirometry  - Assess the need for suctioning and perform as needed  - Assess and instruct to report SOB or any respiratory difficulty  - Respiratory Therapy support as indicated  - Manage/alleviate anxiety  - Monitor for signs/symptoms of CO2 retention  Outcome: Progressing  Note: Patient currently tolerating NO treatment however unable to wean down on PEEP or FIO2.      Problem: CARDIOVASCULAR - ADULT  Goal: Maintains optimal cardiac output and hemodynamic stability  Description: INTERVENTIONS:  - Monitor vital signs, rhythm, and trends  - Monitor for bleeding, hypotension and signs of decreased cardiac output  - Evaluate effectiveness of vasoactive medications to optimize hemodynamic stability  - Monitor arterial and/or venous puncture sites for bleeding and/or hematoma  - Assess quality of pulses, skin color and temperature  - Assess for signs of decreased coronary artery perfusion - ex. Angina  - Evaluate fluid balance, assess for edema, trend weights  Outcome: Progressing  Note: Patient received maxed on 2 pressors with a third one initiated. Weaning down support as tolerated. Goal: Absence of cardiac arrhythmias or at baseline  Description: INTERVENTIONS:  - Continuous cardiac monitoring, monitor vital signs, obtain 12 lead EKG if indicated  - Evaluate effectiveness of antiarrhythmic and heart rate control medications as ordered  - Initiate emergency measures for life threatening arrhythmias  - Monitor electrolytes and administer replacement therapy as ordered  Outcome: Progressing  Note: Patient received with HR sustaining in the mid-upper 130s. Attempts to provide additional analgesia to determine if patient is in pain did not produce improvement in HR. Instead, adjustments are being made to wean off of levophed and using irina to provide BP support as patient seems to tolerate it better.      Problem: Diabetes/Glucose Control  Goal: Glucose maintained within prescribed range  Description: INTERVENTIONS:  - Monitor Blood Glucose as ordered  - Assess for signs and symptoms of hyperglycemia and hypoglycemia  - Administer ordered medications to maintain glucose within target range  - Assess barriers to adequate nutritional intake and initiate nutrition consult as needed  - Instruct patient on self management of diabetes  Outcome: Progressing  Note: Accuchecks performed and insulin provided per MD orders

## 2022-02-13 NOTE — PLAN OF CARE
Problem: RESPIRATORY - ADULT  Goal: Achieves optimal ventilation and oxygenation  Description: INTERVENTIONS:  - Assess for changes in respiratory status  - Assess for changes in mentation and behavior  - Position to facilitate oxygenation and minimize respiratory effort  - Oxygen supplementation based on oxygen saturation or ABGs  - Provide Smoking Cessation handout, if applicable  - Encourage broncho-pulmonary hygiene including cough, deep breathe, Incentive Spirometry  - Assess the need for suctioning and perform as needed  - Assess and instruct to report SOB or any respiratory difficulty  - Respiratory Therapy support as indicated  - Manage/alleviate anxiety  - Monitor for signs/symptoms of CO2 retention     Patient remains on full vent support on the following settings with no changes PRVC/AC 35/500/+15/100%. Nitric Oxide remains running @ 40 PPM. No weaning done to vent or nitric due to patient saturating 88-89%. RT will continue to monitor.

## 2022-02-13 NOTE — PROGRESS NOTES
Patient received on vent settings noted, pt is on Flavio set at 40 ppm, no changes made overnight. Patient suctioned when needed. RT will continue to monitor pt.    02/13/22 9290   Vent Information   $ RT Standby Charge (per 15 min) 1  (vent/Flavio check)   $ Vent Care / Non-Invasive Subsequent Day Yes   Ventilator Initiation 01/13/22   Interface Invasive   Vent Type AV   Vent ID [id number]   Vent Mode PRVC/AC   Settings   FiO2 (%) 100 %   Resp Rate (Set) 35   Vt (Set, mL) 500 mL   Waveform Decelerating ramp   PEEP/CPAP (cm H2O) 15 cm H20   Insp Time (sec) 0.7 sec   Trigger Sensitivity Flow (L/min) 3 L/min   Trigger Sensitivity Pressure (cm H2O) 3 cm H2O   Humidification Heat and moisture exchanger   Readings   ETCO2 (mmHg) 51 mmHg   Total RR 35   Minute Ventilation (L/min) 16.4 L/min   Expiratory Tidal Volume 480 mL   PIP Observed (cm H2O) 45 cm H2O   MAP (cm H2O) 27   Alarms   High RR 45   Insp Pressure High (cm H2O) 60 cm H2O   Insp Pressure Low (cm H2O) 10 cm H2O   MV High (L/min) 25 L/min   MV Low (L/min) 3 L/min   Apnea Interval (sec) 20 seconds   ETT   Placement Date/Time: 01/13/22 3424   Airway Size: 7.5 mm   Secured at (cm) 27 cm   Suctioned?  N   Measured From [address] by [employer] tube camejo   Req'd equipment at bedside Bag mask

## 2022-02-14 NOTE — PROGRESS NOTES
Received PT intubated with a (7.5) ETT secured at (27) on vent with the following settings; BS heard bilaterally, SXN provided as needed. FiO2 titrated to 90%. Nitric weaned to 35ppm. Pt tolerating well. RT to continue to monitor.         02/14/22 0417   Vent Information   Vent Mode PRVC/AC   Settings   FiO2 (%) 90 %   Resp Rate (Set) 35   Vt (Set, mL) 500 mL   Waveform Decelerating ramp   PEEP/CPAP (cm H2O) 15 cm H20   Insp Time (sec) 0.7 sec   Trigger Sensitivity Flow (L/min) 3 L/min   Humidification Heat and moisture exchanger   Readings   Total RR 35   Minute Ventilation (L/min) 17 L/min   Expiratory Tidal Volume 480 mL   PIP Observed (cm H2O) 44 cm H2O   MAP (cm H2O) 26   Plateau Pressure (cm H2O) 43 cm H2O   Static Compliance (L/cm H2O) 16   Dynamic Compliance (L/cm H2O) 17 L/cm H2O

## 2022-02-14 NOTE — PLAN OF CARE
Problem: Patient Centered Care  Goal: Patient preferences are identified and integrated in the patient's plan of care  Description: Interventions:  - What would you like us to know as we care for you? My  is my point of contact. - Provide timely, complete, and accurate information to patient/family  - Incorporate patient and family knowledge, values, beliefs, and cultural backgrounds into the planning and delivery of care  - Encourage patient/family to participate in care and decision-making at the level they choose  - Honor patient and family perspectives and choices  Outcome: Progressing  Note:  and daughter present at initial portion of shift. Able to answer questions and provide updates to plan of care prior to them leaving for the evening. My direction extension was also shared with them should they have wanted to seek an update overnight. Problem: PAIN - ADULT  Goal: Verbalizes/displays adequate comfort level or patient's stated pain goal  Description: INTERVENTIONS:  - Encourage pt to monitor pain and request assistance  - Assess pain using appropriate pain scale  - Administer analgesics based on type and severity of pain and evaluate response  - Implement non-pharmacological measures as appropriate and evaluate response  - Consider cultural and social influences on pain and pain management  - Manage/alleviate anxiety  - Utilize distraction and/or relaxation techniques  - Monitor for opioid side effects  - Notify MD/LIP if interventions unsuccessful or patient reports new pain  - Anticipate increased pain with activity and pre-medicate as appropriate  Outcome: Progressing  Note: Patient does not appear to be in any discomfort. Frequent slight position changes have been made as hemodynamics have allowed.      Problem: RISK FOR INFECTION - ADULT  Goal: Absence of fever/infection during anticipated neutropenic period  Description: INTERVENTIONS  - Monitor WBC  - Administer growth factors as ordered  - Implement neutropenic guidelines  Outcome: Progressing     Problem: SAFETY ADULT - FALL  Goal: Free from fall injury  Description: INTERVENTIONS:  - Assess pt frequently for physical needs  - Identify cognitive and physical deficits and behaviors that affect risk of falls. - [place] fall precautions as indicated by assessment.  - Educate pt/family on patient safety including physical limitations  - Instruct pt to call for assistance with activity based on assessment  - Modify environment to reduce risk of injury  - Provide assistive devices as appropriate  - Consider OT/PT consult to assist with strengthening/mobility  - Encourage toileting schedule  Outcome: Progressing  Note: Bed remains in lowest/locked position with call light within reach.       Problem: DISCHARGE PLANNING  Goal: Discharge to home or other facility with appropriate resources  Description: INTERVENTIONS:  - Identify barriers to discharge w/pt and caregiver  - Include patient/family/discharge partner in discharge planning  - Arrange for needed discharge resources and transportation as appropriate  - Identify discharge learning needs (meds, wound care, etc)  - Arrange for interpreters to assist at discharge as needed  - Consider post-discharge preferences of patient/family/discharge partner  - Complete POLST form as appropriate  - Assess patient's ability to be responsible for managing their own health  - Refer to Case Management Department for coordinating discharge planning if the patient needs post-hospital services based on physician/LIP order or complex needs related to functional status, cognitive ability or social support system  Outcome: Progressing     Problem: RESPIRATORY - ADULT  Goal: Achieves optimal ventilation and oxygenation  Description: INTERVENTIONS:  - Assess for changes in respiratory status  - Assess for changes in mentation and behavior  - Position to facilitate oxygenation and minimize respiratory effort  - Oxygen supplementation based on oxygen saturation or ABGs  - Provide Smoking Cessation handout, if applicable  - Encourage broncho-pulmonary hygiene including cough, deep breathe, Incentive Spirometry  - Assess the need for suctioning and perform as needed  - Assess and instruct to report SOB or any respiratory difficulty  - Respiratory Therapy support as indicated  - Manage/alleviate anxiety  - Monitor for signs/symptoms of CO2 retention  Outcome: Progressing  Note: Coordinated with RT overnight to begin slowly titrating down FIO2 and NO support as tolerated. Saturations remain stable. Desaturation occurs quickly during turn, but recovery is quick once returned to upright position. Problem: CARDIOVASCULAR - ADULT  Goal: Maintains optimal cardiac output and hemodynamic stability  Description: INTERVENTIONS:  - Monitor vital signs, rhythm, and trends  - Monitor for bleeding, hypotension and signs of decreased cardiac output  - Evaluate effectiveness of vasoactive medications to optimize hemodynamic stability  - Monitor arterial and/or venous puncture sites for bleeding and/or hematoma  - Assess quality of pulses, skin color and temperature  - Assess for signs of decreased coronary artery perfusion - ex. Angina  - Evaluate fluid balance, assess for edema, trend weights  Outcome: Progressing  Note: Levophed has been weaned off. Fritz and Vaso remain on. Goal: Absence of cardiac arrhythmias or at baseline  Description: INTERVENTIONS:  - Continuous cardiac monitoring, monitor vital signs, obtain 12 lead EKG if indicated  - Evaluate effectiveness of antiarrhythmic and heart rate control medications as ordered  - Initiate emergency measures for life threatening arrhythmias  - Monitor electrolytes and administer replacement therapy as ordered  Outcome: Progressing  Note: Patient's heart rate has remained in NSR for duration of shift.      Problem: Diabetes/Glucose Control  Goal: Glucose maintained within prescribed range  Description: INTERVENTIONS:  - Monitor Blood Glucose as ordered  - Assess for signs and symptoms of hyperglycemia and hypoglycemia  - Administer ordered medications to maintain glucose within target range  - Assess barriers to adequate nutritional intake and initiate nutrition consult as needed  - Instruct patient on self management of diabetes  Outcome: Progressing  Note: Accuchecks performed per MD orders. Since tube feedings have been interrupted, patient has been placed on IVF to prevent hypoglycemia.

## 2022-02-15 NOTE — PROGRESS NOTES
Patient received on vent settings noted, pt suctioned as needed, pt is on 35 ppm via Nitric. No changes made overnight. RT will continue to monitor pt.   02/15/22 2379   Vent Information   $ RT Standby Charge (per 15 min) 1  (VENT CHECK)   $ Vent Care / Non-Invasive Subsequent Day Yes   Ventilator Initiation 01/13/22   Interface Invasive   Vent Type AV   Vent ID [id number]   Vent Mode PRVC/AC   Settings   FiO2 (%) 90 %   Resp Rate (Set) 35   Vt (Set, mL) 500 mL   Waveform Decelerating ramp   PEEP/CPAP (cm H2O) 15 cm H20   Insp Time (sec) 0.7 sec   Trigger Sensitivity Flow (L/min) 3 L/min   Trigger Sensitivity Pressure (cm H2O) 3 cm H2O   Humidification Heat and moisture exchanger   Readings   ETCO2 (mmHg) 50 mmHg   Total RR 35   Minute Ventilation (L/min) 15.4 L/min   Expiratory Tidal Volume 450 mL   PIP Observed (cm H2O) 44 cm H2O   MAP (cm H2O) 26   Alarms   High RR 45   Insp Pressure High (cm H2O) 60 cm H2O   Insp Pressure Low (cm H2O) 10 cm H2O   MV High (L/min) 25 L/min   MV Low (L/min) 3 L/min   Apnea Interval (sec) 20 seconds   ETT   Placement Date/Time: 01/13/22 3394   Airway Size: 7.5 mm   Secured at (cm) 27 cm   Suctioned?  Y   Measured From [address] by [employer] tube camejo   Req'd equipment at bedside Bag mask

## 2022-02-16 NOTE — RESPIRATORY THERAPY NOTE
Pt received  On the vent with settings PRVC/AC/ RR 35/ FIO2 95%/ PEEP 15/ . ETT size 7.5 @ 27  At the lips. PT is on 30 ppm via nitric.  Sx done and continue to monitor the PT

## 2022-02-16 NOTE — PROGRESS NOTES
[facility] Dosing Service    Follow-up Pharmacokinetic Consult for Vancomycin Dosing     Michalene Severance is a 46year old patient who is being treated for sepsis. Patient is on day 21 of vancomycin and is currently receiving 1000 mg Q 24 hours. Labs:  Lab Results   Component Value Date    CREATSERUM 0.51 02/16/2022    CREATSERUM 0.51 02/15/2022      CrCl:  Estimated Creatinine Clearance: 126.9 mL/min (A) (based on SCr of 0.51 mg/dL (L)). Levels:  trough: 13.1 mcg/mL    Based on the above:    1. Continue Vancomycin at 1000 mg IVPB Q 24 hours based on pharmacokinetics and renal function. 2.  Will re-check vancomycin trough level(s) in 5-7 days. Goal trough is 10-15 mcg/mL unless otherwise noted by ordering provider. 3.  Pharmacy will order SCr as clinically indicated while on vancomycin to assess renal function. 4. Pharmacy will follow and monitor renal function, toxicity and efficacy. We appreciate the opportunity to assist in the care of this patient.     Yunior Rodriguez, PharmD  2/16/2022  1:53 PM  [address]: [phone]

## 2022-02-17 NOTE — PROGRESS NOTES
02/17/22 0332   Vent Information   Vent Mode PRVC/AC   Settings   FiO2 (%) 85 %   Resp Rate (Set) 30   Vt (Set, mL) 500 mL   Waveform Decelerating ramp   PEEP/CPAP (cm H2O) 15 cm H20       Received patient intubated/mechanically ventilated on full support. ANGEL weaned to 20 PPM. B/L BS heard. Will continue to wean FIO2 as tolerated. RT will continue to monitor.

## 2022-02-18 NOTE — PROCEDURES
Procedure:  Central line placement: Consent for severe hypotension requiring pressors; maximal sterile barrier technique was uses including cap, gown, sterile gloves, large sheet, handwashing and chlorexidine prep. The area anesthetized with 1% lidocaine. The left subclavian vein was punctured under ultrasound guidance then a wire introducer was placed, a triple lumen catheter was placed using Seldinger technique. No complications. Blood return low pressure, dark blood. Patient tolerated procedure well. Line placement verified chest x-ray. The procedure was performed by myself.

## 2022-02-18 NOTE — RESPIRATORY THERAPY NOTE
Patient received intubated with 7.5 ETT secured 27 cms at the lips on the following vent settings: PRVC 35/500/+15/90% maintaining SpO2 >88%. Patient is also on 20 ppm NO. LOUISE BS auscultated and suctioned patient as needed. No changes made overnight. Patient is tolerating vent well, RT to continue monitor the patient.

## 2022-02-19 NOTE — PLAN OF CARE
Problem: RESPIRATORY - ADULT  Goal: Achieves optimal ventilation and oxygenation  Description: INTERVENTIONS:  - Assess for changes in respiratory status  - Assess for changes in mentation and behavior  - Position to facilitate oxygenation and minimize respiratory effort  - Oxygen supplementation based on oxygen saturation or ABGs  - Provide Smoking Cessation handout, if applicable  - Encourage broncho-pulmonary hygiene including cough, deep breathe, Incentive Spirometry  - Assess the need for suctioning and perform as needed  - Assess and instruct to report SOB or any respiratory difficulty  - Respiratory Therapy support as indicated  - Manage/alleviate anxiety  - Monitor for signs/symptoms of CO2 retention     Patient remains intubated/mechanically ventilated on full support. Reamins on high vent support with Low spo2 noted throughout the shift, BS remain diminished bilaterally.

## 2022-02-19 NOTE — PLAN OF CARE
Ricardo Hines remains intubated, sedation continued. Weak gag and cough, grimace to pain. Increasing oxygen demands and heart rate overnight, Hypotension this AM, see MAR. Dr. Alyssa Lee at bedside. Chest tube output as charted. Family updated this AM.   Problem: Patient Centered Care  Goal: Patient preferences are identified and integrated in the patient's plan of care  Description: Interventions:  - What would you like us to know as we care for you? My  is my point of contact.   - Provide timely, complete, and accurate information to patient/family  - Incorporate patient and family knowledge, values, beliefs, and cultural backgrounds into the planning and delivery of care  - Encourage patient/family to participate in care and decision-making at the level they choose  - Honor patient and family perspectives and choices  Outcome: Progressing     Problem: PAIN - ADULT  Goal: Verbalizes/displays adequate comfort level or patient's stated pain goal  Description: INTERVENTIONS:  - Encourage pt to monitor pain and request assistance  - Assess pain using appropriate pain scale  - Administer analgesics based on type and severity of pain and evaluate response  - Implement non-pharmacological measures as appropriate and evaluate response  - Consider cultural and social influences on pain and pain management  - Manage/alleviate anxiety  - Utilize distraction and/or relaxation techniques  - Monitor for opioid side effects  - Notify MD/LIP if interventions unsuccessful or patient reports new pain  - Anticipate increased pain with activity and pre-medicate as appropriate  Outcome: Progressing     Problem: RISK FOR INFECTION - ADULT  Goal: Absence of fever/infection during anticipated neutropenic period  Description: INTERVENTIONS  - Monitor WBC  - Administer growth factors as ordered  - Implement neutropenic guidelines  Outcome: Progressing     Problem: SAFETY ADULT - FALL  Goal: Free from fall injury  Description: INTERVENTIONS:  - Assess pt frequently for physical needs  - Identify cognitive and physical deficits and behaviors that affect risk of falls.   - [place] fall precautions as indicated by assessment.  - Educate pt/family on patient safety including physical limitations  - Instruct pt to call for assistance with activity based on assessment  - Modify environment to reduce risk of injury  - Provide assistive devices as appropriate  - Consider OT/PT consult to assist with strengthening/mobility  - Encourage toileting schedule  Outcome: Progressing     Problem: SKIN/TISSUE INTEGRITY - ADULT  Goal: Skin integrity remains intact  Description: INTERVENTIONS  - Assess and document risk factors for pressure ulcer development  - Assess and document skin integrity  - Monitor for areas of redness and/or skin breakdown  - Initiate interventions, skin care algorithm/standards of care as needed  Outcome: Progressing     Problem: Diabetes/Glucose Control  Goal: Glucose maintained within prescribed range  Description: INTERVENTIONS:  - Monitor Blood Glucose as ordered  - Assess for signs and symptoms of hyperglycemia and hypoglycemia  - Administer ordered medications to maintain glucose within target range  - Assess barriers to adequate nutritional intake and initiate nutrition consult as needed  - Instruct patient on self management of diabetes  Outcome: Progressing     Problem: HEMATOLOGIC - ADULT  Goal: Free from bleeding injury  Description: (Example usage: patient with low platelets)  INTERVENTIONS:  - Avoid intramuscular injections, enemas and rectal medication administration  - Ensure safe mobilization of patient  - Hold pressure on venipuncture sites to achieve adequate hemostasis  - Assess for signs and symptoms of internal bleeding  - Monitor lab trends  - Patient is to report abnormal signs of bleeding to staff  - Avoid use of toothpicks and dental floss  - Use electric shaver for shaving  - Use soft bristle tooth brush  - Limit straining and forceful nose blowing  Outcome: Progressing     Problem: DISCHARGE PLANNING  Goal: Discharge to home or other facility with appropriate resources  Description: INTERVENTIONS:  - Identify barriers to discharge w/pt and caregiver  - Include patient/family/discharge partner in discharge planning  - Arrange for needed discharge resources and transportation as appropriate  - Identify discharge learning needs (meds, wound care, etc)  - Arrange for interpreters to assist at discharge as needed  - Consider post-discharge preferences of patient/family/discharge partner  - Complete POLST form as appropriate  - Assess patient's ability to be responsible for managing their own health  - Refer to Case Management Department for coordinating discharge planning if the patient needs post-hospital services based on physician/LIP order or complex needs related to functional status, cognitive ability or social support system  Outcome: Not Progressing     Problem: RESPIRATORY - ADULT  Goal: Achieves optimal ventilation and oxygenation  Description: INTERVENTIONS:  - Assess for changes in respiratory status  - Assess for changes in mentation and behavior  - Position to facilitate oxygenation and minimize respiratory effort  - Oxygen supplementation based on oxygen saturation or ABGs  - Provide Smoking Cessation handout, if applicable  - Encourage broncho-pulmonary hygiene including cough, deep breathe, Incentive Spirometry  - Assess the need for suctioning and perform as needed  - Assess and instruct to report SOB or any respiratory difficulty  - Respiratory Therapy support as indicated  - Manage/alleviate anxiety  - Monitor for signs/symptoms of CO2 retention  Outcome: Not Progressing     Problem: CARDIOVASCULAR - ADULT  Goal: Maintains optimal cardiac output and hemodynamic stability  Description: INTERVENTIONS:  - Monitor vital signs, rhythm, and trends  - Monitor for bleeding, hypotension and signs of decreased cardiac output  - Evaluate effectiveness of vasoactive medications to optimize hemodynamic stability  - Monitor arterial and/or venous puncture sites for bleeding and/or hematoma  - Assess quality of pulses, skin color and temperature  - Assess for signs of decreased coronary artery perfusion - ex.  Angina  - Evaluate fluid balance, assess for edema, trend weights  Outcome: Not Progressing  Goal: Absence of cardiac arrhythmias or at baseline  Description: INTERVENTIONS:  - Continuous cardiac monitoring, monitor vital signs, obtain 12 lead EKG if indicated  - Evaluate effectiveness of antiarrhythmic and heart rate control medications as ordered  - Initiate emergency measures for life threatening arrhythmias  - Monitor electrolytes and administer replacement therapy as ordered  Outcome: Not Progressing

## 2022-02-19 NOTE — PROGRESS NOTES
CRRT machine alarming, unable to clear alarm and restart treatment. Blood returned through venous side. Lines flushed and clamped. Dr. Rhett Goss paged to update. [place] called to restart CRRT.     1201: Nephrologist on call returned page. Ok to restart CRRT.    1427: Susannah RN at bedside, ready to restart CRRT. Dr. Rhett Goss updated on current vitals (, BP 89/37 (54), O2 sat 83%). Per Dr. Rhett Goss, attempt to restart CRRT, if blood pressure drops and does not recover, terminate CRRT.

## 2022-02-20 NOTE — PROGRESS NOTES
CRRT machine began alarming, high pressure alarm. Clot found in arterial port, blood returned through venous port. Tpa instilled. Nephrologt notified, per Dr. Cleemncia Miller, hold CRRT at this time, will reassess after afternoon labs.

## 2022-02-20 NOTE — RESPIRATORY THERAPY NOTE
Patient received intubated with 7.5 ETT secured 27 cms at the lips on the following vent settings: PRVC 35/500/+15/100% maintaining SpO2 >86%. Patient is also on 40 ppm NO. LOUISE BS auscultated and suctioned patient as needed. No changes made overnight. Patient is tolerating vent well, RT to continue monitor the patient.

## 2022-02-21 NOTE — PROGRESS NOTES
Received PT intubated with a (7.5) ETT secured at (27) on vent with the following settings;   BS heard bilaterally, SXN provided as needed. No changes made, RT to continue to monitor.         02/21/22 0343   Vent Information   Vent Mode PRVC/AC   Settings   FiO2 (%) 100 %   Resp Rate (Set) 35   Vt (Set, mL) 500 mL   Waveform Decelerating ramp   PEEP/CPAP (cm H2O) 15 cm H20   PEEP Low (cm H2O) 8 cm H2O   Insp Time (sec) 0.6 sec   Trigger Sensitivity Flow (L/min) 3 L/min   Humidification Heat and moisture exchanger   Readings   Total RR 35   Minute Ventilation (L/min) 17.3 L/min   Expiratory Tidal Volume 510 mL   PIP Observed (cm H2O) 45 cm H2O   MAP (cm H2O) 25   PEEP Low (cm H2O) 8 cm H2O   Plateau Pressure (cm H2O) 40 cm H2O   Static Compliance (L/cm H2O) 20   Dynamic Compliance (L/cm H2O) 18 L/cm H2O

## 2022-02-22 LAB — BLOOD TYPE BARCODE: 2800

## 2022-02-22 NOTE — PAYOR COMM NOTE
Discharge Notification    Patient Name: August Wright: [employer]  Subscriber #: [id number]  Authorization Number: [id number]  Admit Date/Time: 2022 9:41 AM  Discharge Date/Time: 2022 4:04 PM

## 2024-09-12 NOTE — CM/SW NOTE
OUTPATIENT PSYCHIATRY FOLLOW-UP VISIT     PATIENT:  Brigida Abraham)  MRN:  [id number]  :  1980  DATE OF SERVICE:  2024    This visit was performed via live 2-way video platform.    Patient appropriately identifies self at beginning of call, relays is in a private location, and verbalizes consent to conduct session via video.    Pt last seen 24, during which the following was done:  -continue duloxetine 30mg PO Qday for mood/anxiety   -start Buspar 5mg PO BID for anxiety    SUBJECTIVE  Since last visit, patient stated she is doing well. She reports good effect with Buspar and no noted adverse effects. Reports she is still very busy in her day-to-day life but coping well. Remains adherent with duloxetine and Buspar as above with no noted adverse effects. Psychiatric hx, family psychiatric hx, social hx, and substance use hx reviewed and updated if necessary.     Psychiatric Hx:  Diagnoses: adjustment disorder  Treatment: last psychiatrist was Dr. 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 Clinton Rodrigues RN stated that [facility] do not have any beds available at this time.   GINA JOSEASMITA spoke with Jael Moseley and they are on [address], [address] , [address] [employer] [address]  [phone] before son was born  Cannabis: pt denies  Illicit Substances: pt denies     OBJECTIVE  Medications:  Current Outpatient Medications   Medication Sig    busPIRone (BUSPAR) 5 MG tablet Take 1 tablet by mouth in the morning and 1 tablet in the evening.    DULoxetine (CYMBALTA) 30 MG capsule Take 1 capsule by mouth daily.    galcanezumab-gnlm (Emgality) 120 MG/ML injection Inject 1 mL into the skin every 28 days.    norethindrone (MICRONOR) 0.35 MG tablet Take 1 tablet by mouth daily.    rimegepant sulfate (Nurtec) 75 MG disintegrating tablet Take 1 tablet by mouth daily as needed for migraine.    ondansetron (ZOFRAN ODT) 4 MG disintegrating tablet Place 1 tablet onto the tongue 3 times daily as needed for Nausea and/or vomiting.    calcium carbonate-vitamin D (CALTRATE+D) 600-400 MG-UNIT per tablet Take 1 tablet by mouth daily.    Cholecalciferol (VITAMIN D) 2000 units tablet Take 2,000 Units by mouth daily.    Multiple Vitamins-Minerals (MULTIVITAMIN WITH MINERALS) tablet Take 1 tablet by mouth daily.     No current facility-administered medications for this visit.        Allergies:  ALLERGIES:   Allergen Reactions    Penicillins RASH       MENTAL STATUS EXAM  Appearance: Appears stated age, dressed in casual clothes, good grooming/hygiene.  Behavior: Calm, cooperative, good eye contact. No psychomotor agitation/retardation, no tics/tremors.  Speech: Regular rate and rhythm, normal tone, non-pressured.  Mood: \"Okay\"  Affect: Euthymic, normal range, non-labile.  Thought Process: Linear, logical, goal-directed.  Thought Content: Denies SI/HI/obsessions/paranoid or delusion ideation. No evidence of response to internal to stimuli. Denies AVH.  General Cognition: A&Ox3, good concentration, memory intact.  Insight/Judgment: Good/good       ASSESSMENT  Brigida Vazquez is a 44 year old female with PMHx significant for migraines, Celiac disease and PPHx significant for adjustment disorder who presents to clinic today for  routine follow up and medication management. Biologically, pt denies any regular substance use or medical condition that is likely to be contributing to current presentation. Psychologically, patient initially endorsed \"indifferent\" mood, anhedonia, and low energy. She has a hx of hair pulling which occurs when she is stressed. Socially, good support from , works as an ED physician. No acute safety concerns at this time. Plan is to continue current psychotropic medication regimen; pt in agreement with plan.    Diagnosis:  MDD, recurrent, in full remission  Trichotillomania    PLAN  -continue duloxetine 30mg PO Qday for mood/anxiety   -continue Buspar 5mg PO BID for anxiety  -discussed the benefits/risks/alternatives/side effects of treatment plan with patient  -RTC in 2-3 months, or before if needed     Sumi Phillips MD  [facility]      E&M services were provided and are based on medical complexity.

## (undated) NOTE — LETTER
[address], [place]  Authorization for Invasive Procedures  1. I hereby authorize Dr. Everardo Martínez , my physician and whomever may be designated as the doctor's assistant, to perform the following operation and/or procedure: Insertion of a chest tube on Jc Colon at [facility].    2. My physician has explained to me the nature and purpose of the operation or other procedure, possible alternative methods of treatment, the risks involved and the possibility of complications to me. I understand the probable consequences of declining the recommended procedure and the alternative methods of treatment. I acknowledge that no guarantee has been made as to the result that may be obtained. 3. I recognize that during the course of this operation or other procedure, unforeseen conditions may necessitate additional or different procedures than those listed above. I, therefore, further authorize and request that the above-named physician, his/her physician assistants, or designees perform such procedures as are, in his/her professional opinion, necessary and desirable. If I have a Do Not Attempt Resuscitation (DNAR) order in place, that status will be suspended while in the operating room, procedural suite, and during the recovery period unless otherwise explicitly stated by me (or a person authorized to consent on my behalf). The surgeon or my attending physician will determine when the applicable recovery period ends for purposes of reinstating the DNAR order. 4. Should the need arise during my operation or immediate post-operative period; I also consent to the administration of blood and/or blood products.  Further, I understand that despite careful testing and screening of blood and blood products, I may still be subject to ill effects as a result of recieving a blood transfusion an/or blood producst. The following are some, but not all, of the potential risks that can occur: fever and allergic reactions, hemolytic reactions, transmission of disease such as hepatitis, AIDS, cytomegalovirus (CMV), and flluid overload. In the event that I wish to have autologous transfusions of my own blood, or a directed donor transfusion, I will discuss this with my physician. 5. I consent to the photographing of the operations or procedures to be performed for the purposes of advancing medicine, science, and/or education, provided my identity is not revealed. If the procedure has been videotaped, the physician/surgeon will obtain the original videotape. The hospital will not be responsible for storage or maintenance of this tape. 6. I consent to the presence of a  or observer as deemed necessary by my physician or his designee. 7. Any tissues or organs removed in the operation or other procedure may be disposed of by and at the discretion of [facility].    8. I understand that the physician and his/her physician assistants may not be employees or agents of [facility], [facility], nor [facility], but are independent medical practitioners who have been permitted to use its facilities for the care and treatment of their patients. 9. Patients having a sterilization procedure: I understand that if the procedure is successful the results will be permanent and it will therefore be impossible for me to inseminate, conceive or bear children. I also understand that the procedure is intended to result in sterility, although the result has not been guaranteed. 10. I CERTIFY THAT I HAVE READ AND FULLY UNDERSTAND THE ABOVE CONSENT TO OPERATION and/or OTHER PROCEDURE. 11. I acknowledge that my physician has explained sedation/analgesia administration to me including the risks and benefits. I consent to the administration of sedation/analgesia as may be necessary or desirable in the judgment of my physician. Signature of Patient:  ________________________________________________ Date: _________Time: _________    Responsible person in case of minor or unconscious: _____________________________Relationship: ____________     Witness Signature: ____________________________________________ Date: __________ Time: ___________    Statement of Physician  My signature below affirms that prior to the time of the procedure, I have explained to the patient and/or her legal representative, the risks and benefits involved in the proposed treatment and any reasonable alternative to the proposed treatment. I have also explained the risks and benefits involved in the refusal of the proposed treatment and have answered the patient's questions. If I have a significant financial interest in this procedure/surgery, I have disclosed this and had a discussion with my patient.     Signature of Physician:   ________________________________________Date: _________Time:_______ Patient Name: Ale Hickman  : 1970   Printed: 2022    Medical Record #: [id number]

## (undated) NOTE — LETTER
[address] [facility] [place]  Authorization for Invasive Procedures  1. I hereby authorize , my physician and whomever may be designated as the doctor's assistant, to perform the following operation and/or procedure:  IR non-tunnled catheter on Fay Settle at [facility].    2. My physician has explained to me the nature and purpose of the operation or other procedure, possible alternative methods of treatment, the risks involved and the possibility of complications to me. I understand the probable consequences of declining the recommended procedure and the alternative methods of treatment. I acknowledge that no guarantee has been made as to the result that may be obtained. 3. I recognize that during the course of this operation or other procedure, unforeseen conditions may necessitate additional or different procedures than those listed above. I, therefore, further authorize and request that the above-named physician, his/her physician assistants, or designees perform such procedures as are, in his/her professional opinion, necessary and desirable. If I have a Do Not Attempt Resuscitation (DNAR) order in place, that status will be suspended while in the operating room, procedural suite, and during the recovery period unless otherwise explicitly stated by me (or a person authorized to consent on my behalf). The surgeon or my attending physician will determine when the applicable recovery period ends for purposes of reinstating the DNAR order. 4. Should the need arise during my operation or immediate post-operative period; I also consent to the administration of blood and/or blood products.  Further, I understand that despite careful testing and screening of blood and blood products, I may still be subject to ill effects as a result of recieving a blood transfusion an/or blood producst. The following are some, but not all, of the potential risks that can occur: fever and allergic reactions, hemolytic reactions, transmission of disease such as hepatitis, AIDS, cytomegalovirus (CMV), and flluid overload. In the event that I wish to have autologous transfusions of my own blood, or a directed donor transfusion, I will discuss this with my physician. 5. I consent to the photographing of the operations or procedures to be performed for the purposes of advancing medicine, science, and/or education, provided my identity is not revealed. If the procedure has been videotaped, the physician/surgeon will obtain the original videotape. The hospital will not be responsible for storage or maintenance of this tape. 6. I consent to the presence of a  or observer as deemed necessary by my physician or his designee. 7. Any tissues or organs removed in the operation or other procedure may be disposed of by and at the discretion of [facility].    8. I understand that the physician and his/her physician assistants may not be employees or agents of [facility], [facility], [address], but are independent medical practitioners who have been permitted to use its facilities for the care and treatment of their patients. 9. Patients having a sterilization procedure: I understand that if the procedure is successful the results will be permanent and it will therefore be impossible for me to inseminate, conceive or bear children. I also understand that the procedure is intended to result in sterility, although the result has not been guaranteed. 10. I CERTIFY THAT I HAVE READ AND FULLY UNDERSTAND THE ABOVE CONSENT TO OPERATION and/or OTHER PROCEDURE. 11. I acknowledge that my physician has explained sedation/analgesia administration to me including the risks and benefits. I consent to the administration of sedation/analgesia as may be necessary or desirable in the judgment of my physician.      Signature of Patient:  ________________________________________________ Date: _________Time: _________    Responsible person in case of minor or unconscious: _____________________________Relationship: ____________     Witness Signature: ____________________________________________ Date: __________ Time: ___________    Statement of Physician  My signature below affirms that prior to the time of the procedure, I have explained to the patient and/or her legal representative, the risks and benefits involved in the proposed treatment and any reasonable alternative to the proposed treatment. I have also explained the risks and benefits involved in the refusal of the proposed treatment and have answered the patient's questions. If I have a significant financial interest in this procedure/surgery, I have disclosed this and had a discussion with my patient.     Signature of Physician:   ________________________________________Date: _________Time:_______ Patient Name: Travis Cox  : 1970   Printed: 2022    Medical Record #: [id number]

## (undated) NOTE — LETTER
[address], [place]  Authorization for Invasive Procedures  1. I hereby authorize Dr. Alma Peterson , my physician and whomever may be designated as the doctor's assistant, to perform the following operation and/or procedure:  *** on Wyatt Garcia at [facility].    2. My physician has explained to me the nature and purpose of the operation or other procedure, possible alternative methods of treatment, the risks involved and the possibility of complications to me. I understand the probable consequences of declining the recommended procedure and the alternative methods of treatment. I acknowledge that no guarantee has been made as to the result that may be obtained. 3. I recognize that during the course of this operation or other procedure, unforeseen conditions may necessitate additional or different procedures than those listed above. I, therefore, further authorize and request that the above-named physician, his/her physician assistants, or designees perform such procedures as are, in his/her professional opinion, necessary and desirable. If I have a Do Not Attempt Resuscitation (DNAR) order in place, that status will be suspended while in the operating room, procedural suite, and during the recovery period unless otherwise explicitly stated by me (or a person authorized to consent on my behalf). The surgeon or my attending physician will determine when the applicable recovery period ends for purposes of reinstating the DNAR order. 4. Should the need arise during my operation or immediate post-operative period; I also consent to the administration of blood and/or blood products.  Further, I understand that despite careful testing and screening of blood and blood products, I may still be subject to ill effects as a result of recieving a blood transfusion an/or blood producst. The following are some, but not all, of the potential risks that can occur: fever and allergic reactions, hemolytic reactions, transmission of disease such as hepatitis, AIDS, cytomegalovirus (CMV), and flluid overload. In the event that I wish to have autologous transfusions of my own blood, or a directed donor transfusion, I will discuss this with my physician. 5. I consent to the photographing of the operations or procedures to be performed for the purposes of advancing medicine, science, and/or education, provided my identity is not revealed. If the procedure has been videotaped, the physician/surgeon will obtain the original videotape. The hospital will not be responsible for storage or maintenance of this tape. 6. I consent to the presence of a  or observer as deemed necessary by my physician or his designee. 7. Any tissues or organs removed in the operation or other procedure may be disposed of by and at the discretion of [facility].    8. I understand that the physician and his/her physician assistants may not be employees or agents of [facility], [facility], [address], but are independent medical practitioners who have been permitted to use its facilities for the care and treatment of their patients. 9. Patients having a sterilization procedure: I understand that if the procedure is successful the results will be permanent and it will therefore be impossible for me to inseminate, conceive or bear children. I also understand that the procedure is intended to result in sterility, although the result has not been guaranteed. 10. I CERTIFY THAT I HAVE READ AND FULLY UNDERSTAND THE ABOVE CONSENT TO OPERATION and/or OTHER PROCEDURE. 11. I acknowledge that my physician has explained sedation/analgesia administration to me including the risks and benefits. I consent to the administration of sedation/analgesia as may be necessary or desirable in the judgment of my physician.      Signature of Patient: ________________________________________________ Date: _________Time: _________    Responsible person in case of minor or unconscious: _____________________________Relationship: ____________     Witness Signature: ____________________________________________ Date: __________ Time: ___________    Statement of Physician  My signature below affirms that prior to the time of the procedure, I have explained to the patient and/or her legal representative, the risks and benefits involved in the proposed treatment and any reasonable alternative to the proposed treatment. I have also explained the risks and benefits involved in the refusal of the proposed treatment and have answered the patient's questions. If I have a significant financial interest in this procedure/surgery, I have disclosed this and had a discussion with my patient.     Signature of Physician:   ________________________________________Date: _________Time:_______ Patient Name: Dede Bray  : 1970   Printed: 2022    Medical Record #: [id number]

## (undated) NOTE — LETTER
Bruno Larios [address], [place], [place]  [address]  INFORMED CONSENT FOR TRANSFUSION OF BLOOD OR BLOOD PRODUCTS  My physician has informed me of the nature, purpose, benefits and risks of transfusion for blood and blood components that he/she may deem necessary during my treatment or hospitalization. He/she has also discussed alternatives to receiving blood from the voluntary blood supply with me, such as self-donation (autologous) and directed donation (blood donated by family or friends to be used specifically for me). I further understand that while the [address] [place] [address] will attempt to supply any autologous or directed donor blood prior to transfusion of blood from the routine blood supply, medical circumstances may require that other or additional blood components may be required for my care. In giving consent, I acknowledge that my physician has also informed me that despite careful screening and testing in accordance with national and regional regulations, there is still a small risk of transmission of infectious agents including hepatitis, HIV-1/2, cytomegalovirus and other viruses or diseases as yet unknown for which licensed definitive screening tests do not currently exist. Additionally, my physician has informed me of the potential for transfusion reactions not related to an infectious agent. [  ]  Check here for Recurring Outpatient Transfusion Therapy (valid for 1 year) In addition to the above, my physician has informed me that I shall receive numerous transfusions over a period of time and that these can lead to other increased risks. I hereby authorize the transfusion of blood and/or blood products to me as deemed necessary and ordered by physicians participating in my care.  My physician has given me the opportunity to ask questions and any questions asked have been answered to my satisfaction  __________________________________________ ______________________________________________  (Signature of Patient)                                                            (Responsible party in case of Minor,                                                                                                 Incompetent, or unconscious Patient)  ___________________________________________       ________ ______________________________________  (Relationship to Patient)                                                       (Signature of Witness)  ______________________________________________________________________________________________   (Date)                                                                           (Time)  REFUSAL OF CONSENT FOR BLOOD TRANSFUSIONS   Sign only if Refusing   [  ] I understand refusal of blood or blood products as deemed necessary by my physician may have serious consequences to my condition to include possible death.  I hereby assume responsibility for my refusal and release the hospital, its personnel, and my physicians from any responsibility for the consequences of my refusal.    ________________________________________________________________________________  (Signature of Patient)                                                         (Responsible Party/Relationship to Patient)    ________________________________________________________________________________  (Signature of Witness)                                                       (Date/Time)     Patient Name: Ethan Chiang     : 1970                 Printed: 2022      Medical Record #: [id number]                                 Page 1 of 1